# Patient Record
Sex: FEMALE | Race: WHITE | Employment: OTHER | URBAN - NONMETROPOLITAN AREA
[De-identification: names, ages, dates, MRNs, and addresses within clinical notes are randomized per-mention and may not be internally consistent; named-entity substitution may affect disease eponyms.]

---

## 2022-07-13 NOTE — PROGRESS NOTES
MEDICAL ONCOLOGY CONSULTATION    Pt Name: Devon Stallings  MRN: [id number]  [place]: 1955  Date of evaluation: 7/25/2022    REASON FOR CONSULTATION:  Breast cancer  REQUESTING PHYSICIAN: Dr Franco Needle    History Obtained From:  patient and old medical records    HISTORY OF PRESENT ILLNESS:    Diagnosis  Invasive lobular carcinoma, left breast cancer 2002  Tubular carcinoma, right breast, 2006  Low grade  Infiltrating ductal carcinoma, right breast, March 2016  Grade 1  ER 99%, MA 91%, HER-2 2+/equivocal, FISH-negative  Infiltrating ductal carcinoma, right breast, March 2018  Grade 1  ER 90.86%, MA 89.29%, HER-2 1+/negative  Onctoype DX recurrence score: 4, 2018  Infiltrating ductal carcinoma, right breast, Feb 2021  Grade 1   ER 98%, %, HER-2 1+/negative  BRCA negative, 2018    Treatment Summary  2002 Left lumpectomy  2002 MammoSite radiation therapy  2002 Tamoxifen, Arimidex tried, but unable to tolerate adjuvant estrogen blocking therapy  2007 - 2012 Faslodex x5 years  4/25/16 Bilateral skin sparing mastectomy with right negative SLNB  2016 Fareston, Aromasin  4/24/18 Right breast lumpectomy  May 2018 - Aug 2019 Arimidex  Aug 2019 - March 2020 Tamoxifen x3 months. Discontinued due to side effects  Feb 2021 Letrozole-unable to take due to side effects  1/11/22 Arimidex 1mg daily  5/25/22 Faslodex every 4 weeks    Cancer History  The patient has a very long history of left/right breast cancer over the last 2 decades. Further details as depicted below. 2002 Invasive lobular carcinoma, left breast cancer   2002 Tamoxifen, Arimidex tried, but unable to tolerate adjuvant estrogen blocking therapy  2002 MammoSite radiation therapy  2007 Tubular carcinoma, right breast, Low grade  2007 - 2012 Faslodex x5 years  5/24/13 CA 27-29 18.8  2/20/14 CA 27-29 16.8  3/2/15 CA 27-29 19.7  3/18/16 Right breast biopsies [facility]) : infiltrating low grade ductal carcinoma.   ER 99%, MA 91%, HER-2 2+/equivocal, FISH-negative  4/25/16 Bilateral skin sparing mastectomy with right negative SLNB  4/25/16 Bilateral skin sparing mastectomy with right negative SLNB [facility]): Right breast mastectomy - infiltrating ductal carcinoma, low-grade (0.5 cm). No lymphovascular invasion noted. Focal calcifications identified. Tumor noted at the anterior margin. Left breast, mastectomy - unremarkable nipple and areola. Calcified cystic cavity noted. No evidence of malignancy identified. Margins are negative. Report Name: Breast Invasive Carcinoma. Right sentinel node biopsy - benign fat, no lymph node identified. 2016 Jeannette Mcdonnellasin  3/20/18 US right breast [facility]): 5 mm lesion in the right breast. Ultrasound-guided breast biopsy is recommended and will be scheduled. 3/27/18 Right breast 3:00 lump [facility]): Infiltrating ductal carcinoma, grade 1/3. ER 90.86%, NJ 89.29%, HER-2 1+/negative  4/6/18 PET scan [facility]): There is a focal area of increased uptake within the cecum which could be physiologic or could be related to malignancy. Follow-up colonoscopy is recommended. No other sites of disease identified. 4/7/18 MRI bilateral breast [facility]): No evidence of adenopathy. Findings in both breasts are felt to be benign. ACR BIRADS Category 2-benign. Note that this patient does have biopsy of a known malignancy in the right breast at 3:00. No malignant features are demonstrated by MRI, however. 4/24/18 Right breast lumpectomy [facility]): Two foci of infiltrating ductal carcinoma, low grade (6 mm and 3 mm). Surgical margins are negative. April 2018 Cinthya Rice DX recurrence score: 4  May 2018 - Aug 2019 Arimidex  8/27/18 Bone density ([facility]): Normal. Lumbar spine T-score -0.6. Left hi T-score -0.8 falling in the range of normal.   4/10/19 CT abd/pelvis [facility]): No acute abnormality or obvious etiology for patient's symptoms seen.   Aug 2019 - March 2020 Tamoxifen x3 months. Discontinued due to side effects  1/14/21 Right diagnostic mammogram [facility]): Ill-defined lesion in the 7 o'clock location of the postmastectomy right breast is localized to the skin. Given the patient's prior significant history of bilateral breast malignancy and recurrence to the postmastectomy right breast, findings are viewed with suspicion. As a result, surgical consultation is recommended for referral for evaluation and possible excision/skin punch biopsy of the lesion for further evaluation. 1/14/21 US right breast [facility]): Focused ultrasound of the postmastectomy right breast at 3 o'clock, 7 cm from the nipple demonstrates an ill-defined area of hypoechogenicity localized within the skin that measures up to 3 mm. There is no actual mass identified within   the postmastectomy breast tissues. 1/26/21 Right diagnostic mammogram [facility]): There are findings consistent with prior mastectomy with silicone implant reconstruction. A surgical clip is identified in the lower left axillary region. No dominant masses, suspicious calcifications, or areas of nonsurgical architectural distortion are seen. Within the posterior outer left breast, subjacent to the skin marker, there is a questioned area of soft tissue density which may correspond with the questioned palpable finding. Further evaluation was performed with ultrasound. 1/26/21 US right breast [facility]): Targeted left breast sonography was performed. Left 3:00, 8 cm to 12 cm from the nipple, at site of the patient's palpable ability: There is an oval, parallel, hypoechoic/mixed echogenic mass measuring 3.3 cm. It demonstrates a primarily hypoechoic appearance with a questioned hyperechoic rim and a linear area of increased echogenicity within it. It possibly represents an area of fat necrosis or postsurgical change related to her prior tightening procedure, possibly related to sutures or mesh.    Feb 2021 Letrozole-unable to take due to side effects  2/1/21 Breast, right, excisional biopsy [facility]): Infiltrating ductal carcinoma, grade 1 (of 3). Margins are negative for invasive malignancy. Breast, left, excisional biopsy: Fibrosis with foreign body giant cell reaction. Benign skeletal muscle. Negative for malignancy. COMMENT:  There is a focus of invasive ductal carcinoma measuring 0.3 cm located in the dermis and extending into the underlying subcutaneous tissue. There is no involvement of the epidermis. ER 98%, %, HER-2 1+/negative   5/25/21 CA 27-29 18.4  6/8/21 CT chest/abd/pelvis ([facility]): Redemonstrated prominence of the common bile duct and the intrahepatic biliary ducts, which was seen dating back to 2019. Patient is status post cholecystectomy. The liver otherwise shows no focal abnormality. Negative for evidence of metastatic disease elsewhere within the chest, abdomen, or pelvis. 11/4/21 CT chest/abd/pelvis [facility]): Stable exam without evidence for residual or recurrent disease. Postoperative changes in the stomach. Stable intrahepatic and extrahepatic biliary ductal dilatation. 11/21/21 Right breast, 6:00 lesion, excisional biopsy [facility]) : Invasive ductal carcinoma. Size of at least 0.5 cm. Margins appear uninvolved. Right breast, 3:00 lesion, excision: Invasive ductal carcinoma. Size at least 0.5 cm. Margins appear uninvolved. ER 94.54%, MD 96.75%, HER-2 1+/negative  11/12/2021 laparoscopic right hemicolectomy: Showed polyps with multiple fragments of tubulovillous adenoma no high-grade dysplasia. No malignancy no lymph node involvement. 1/11/22 Initiated Arimidex 1mg daily  4/8/22 MRI left shoulder [facility]): Moderate supraspinatus and infraspinatus tendinosis with associated bursitis. Mild shoulder and AC joint osteoarthrosis. No evidence of metastatic disease to the shoulder.   5/10/22 US bilateral breast [facility]): No sonographic evidence of malignancy seen in either breast. Recommend high risk screening as per plan. 5/25/22 Initiated Faslodex every 4 weeks  6/9/22 CA 27-29 18.2  6/24/22 CT chest/abd/pelvis [facility]): No evidence of residual or recurrent disease. Persistent diffuse dilation of the common bile duct and intrahepatic biliary ducts. 7/13/2022 NM PET CT Skull Base to Mid Thigh([facility]) No evidence of disease. 7/25/2022-she is planning to have another surgery tomorrow. She is currently on Faslodex, off label due to poor tolerance on AI/tamoxifen in the past.        Past Medical History:    Past Medical History:   Diagnosis Date    Basal cell adenocarcinoma     Breast cancer [facility])        Past Surgical History:    Past Surgical History:   Procedure Laterality Date    BREAST LUMPECTOMY Right 04/24/2018    BREAST LUMPECTOMY Left 2002    COLON SURGERY         Social History:    Marital status:  Smoking status:No  ETOH status:Occasionally; 1 monthly  Resides[place]    Family History:   Family History   Problem Relation Age of Onset    Breast Cancer Mother 68    Cancer Paternal Aunt 68        Ovarian       Current Hospital Medications:    No current outpatient medications on file. No current facility-administered medications for this visit.        Allergies: Not on File      Subjective   REVIEW OF SYSTEMS:   CONSTITUTIONAL: no fever, no night sweats,hot flashes, no fatigue;  HEENT: no blurring of vision, no double vision, no hearing difficulty, no tinnitus, no ulceration, no dysplasia, no epistaxis;  LUNGS: no cough, no hemoptysis, no wheeze,  no shortness of breath;  CARDIOVASCULAR: no palpitation, no chest pain, no shortness of breath;  GI: no abdominal pain, no nausea, no vomiting, no diarrhea, no constipation;  FRANCINE: no dysuria, no hematuria, no frequency or urgency, no nephrolithiasis;  MUSCULOSKELETAL: no joint pain, no swelling, no stiffness;  ENDOCRINE: no polyuria, no polydipsia, no cold or heat intolerance;  HEMATOLOGY: no easy bruising or bleeding, no history of clotting disorder;  DERMATOLOGY: no skin rash, no eczema, no pruritus;  PSYCHIATRY: no depression, no anxiety, no panic attacks, no suicidal ideation, no homicidal ideation;  NEUROLOGY: no syncope, no seizures, no numbness or tingling of hands, no numbness or tingling of feet, no paresis;    Objective   BP (!) 140/70 (Site: Left Upper Arm, Position: Sitting)   Pulse 92   Ht 5' 3\" (1.6 m)   Wt 169 lb 6.4 oz (76.8 kg)   SpO2 98%   BMI 30.01 kg/m²     PHYSICAL EXAM:  CONSTITUTIONAL: Alert, appropriate, no acute distress  EYES: Non icteric, EOM intact, pupils equal round   ENT: Mucus membranes moist, no oral pharyngeal lesions, external inspection of ears and nose are normal  NECK: Supple, no masses. No palpable thyroid mass  CHEST/LUNGS: CTA bilaterally, normal respiratory effort   CHAPERONED BREAST EXAM: Kwesi Anguiano MA  CARDIOVASCULAR: RRR, no murmurs. No lower extremity edema  ABDOMEN: soft non-tender, active bowel sounds, no HSM. No palpable masses  EXTREMITIES: warm, full ROM in all 4 extremities, no focal weakness. SKIN: 3 breast lumps,warm, dry with no rashes or lesions  LYMPH: No cervical, clavicular, axillary, or inguinal lymphadenopathy  NEUROLOGIC: follows commands, non focal   PSYCH: mood and affect appropriate. Alert and oriented to time, place, person      LABORATORY RESULTS REVIEWED/ANALYZED BY ME:  7/21/2022 CBC  WBC 4.5  RBC 3.58  HGB 12.0  Neut 2.5    RADIOLOGY STUDIES REVIEWED BY ME:  As above      ASSESSMENT:    No orders of the defined types were placed in this encounter. Nimo Panda was seen today for new patient.     Diagnoses and all orders for this visit:    Local recurrence of cancer of right breast [facility])    Care plan discussed with patient    Prophylactic use of fulvestrant (Faslodex)       Local recurrence/second primary IDC right breast ER/NH positive, HER2 negative  -Proceed with surgery tomorrow  -RTC with me after surgery  -Further treatment recommendations to follow  -Continue Faslodex. Next dose 8/17/2022    Genetic assessment she had a BRCA 1/2 test performed in the past.  This was negative. I have recommended her more comprehensive genetic test.  We will order genetic test during next visit. PLAN:  RTC with MD 8/17/2022  Recommend Genetic Testing next visit  Continue Faslodex every 4 weeks  Proceed with surgery  Consider radiation therapy after surgery/pathology report        Mino AGUILERA am pre-charting as a registered nurse for Richelle Brown MD. Electronically signed by Mino Hill RN on 7/25/2022 at 7:15 AM CDT. Mary Parada am scribing as Medical Assistant for Richelle Brown MD. Electronically signed by Rekha Ba MA on 7/25/2022 at 2:37 PM CDT. I, Dr Negrito Herrera, personally performed the services described in this documentation as scribed by Rekha Ba MA in my presence and is both accurate and complete. I have seen, examined and reviewed this patient medication list, appropriate labs and imaging studies. I reviewed relevant medical records and others physicians notes. I discussed the plans of care with the patient. I answered all the questions to the patients satisfaction. I have also reviewed the chief complaint (CC) and part of the history (History of Present Illness (HPI), Past Family Social History [facility]), or Review of Systems (ROS) and made changes when appropriated.        (Please note that portions of this note were completed with a voice recognition program. Efforts were made to edit the dictations but occasionally words are mis-transcribed.)  Electronically signed by Richelle Brown MD on 7/25/2022 at 7:07 PM      The total time (45min) I spent to see the patient today includes at least one or more of the following: preparing to see the patient by reviewing prior tests, prior notes or other relevant information, performing appropriate independent examination and evaluation, counseling, ordering of medications, tests or procedures documenting clinic information in the electronic medical record or other health records, independently interpreting results of tests, managing test results and communicating the results to the patient/family or caregiver.

## 2022-07-25 ENCOUNTER — HOSPITAL ENCOUNTER (OUTPATIENT)
Dept: INFUSION THERAPY | Age: 67
Discharge: HOME | End: 2022-07-25
Payer: MEDICARE

## 2022-07-25 ENCOUNTER — OFFICE VISIT (OUTPATIENT)
Dept: HEMATOLOGY | Age: 67
End: 2022-07-25
Payer: MEDICARE

## 2022-07-25 VITALS
SYSTOLIC BLOOD PRESSURE: 140 MMHG | HEART RATE: 92 BPM | BODY MASS INDEX: 30.02 KG/M2 | HEIGHT: 63 IN | DIASTOLIC BLOOD PRESSURE: 70 MMHG | OXYGEN SATURATION: 98 % | WEIGHT: 169.4 LBS

## 2022-07-25 DIAGNOSIS — Z71.89 CARE PLAN DISCUSSED WITH PATIENT: ICD-10-CM

## 2022-07-25 DIAGNOSIS — Z79.818 PROPHYLACTIC USE OF FULVESTRANT (FASLODEX): ICD-10-CM

## 2022-07-25 DIAGNOSIS — R89.9 ABNORMAL LABORATORY TEST: Primary | ICD-10-CM

## 2022-07-25 DIAGNOSIS — C50.911 LOCAL RECURRENCE OF CANCER OF RIGHT BREAST (HCC): Primary | ICD-10-CM

## 2022-07-25 DIAGNOSIS — R89.9 ABNORMAL LABORATORY TEST: ICD-10-CM

## 2022-07-25 PROCEDURE — 1123F ACP DISCUSS/DSCN MKR DOCD: CPT | Performed by: INTERNAL MEDICINE

## 2022-07-25 PROCEDURE — 1090F PRES/ABSN URINE INCON ASSESS: CPT | Performed by: INTERNAL MEDICINE

## 2022-07-25 PROCEDURE — 3017F COLORECTAL CA SCREEN DOC REV: CPT | Performed by: INTERNAL MEDICINE

## 2022-07-25 PROCEDURE — 4004F PT TOBACCO SCREEN RCVD TLK: CPT | Performed by: INTERNAL MEDICINE

## 2022-07-25 PROCEDURE — 99204 OFFICE O/P NEW MOD 45 MIN: CPT | Performed by: INTERNAL MEDICINE

## 2022-07-25 PROCEDURE — 99212 OFFICE O/P EST SF 10 MIN: CPT

## 2022-07-25 PROCEDURE — G8417 CALC BMI ABV UP PARAM F/U: HCPCS | Performed by: INTERNAL MEDICINE

## 2022-07-25 PROCEDURE — G8400 PT W/DXA NO RESULTS DOC: HCPCS | Performed by: INTERNAL MEDICINE

## 2022-07-25 PROCEDURE — G8428 CUR MEDS NOT DOCUMENT: HCPCS | Performed by: INTERNAL MEDICINE

## 2022-07-26 DIAGNOSIS — C50.919 INFILTRATING DUCTAL CARCINOMA (HCC): ICD-10-CM

## 2022-07-28 ENCOUNTER — TELEPHONE (OUTPATIENT)
Dept: INFUSION THERAPY | Age: 67
End: 2022-07-28

## 2022-07-28 NOTE — TELEPHONE ENCOUNTER
Introduced myself to Wing Zafar today. I went over her insurance benefits with her and informed her that she shouldn't have any out of pocket costs associated with her Faslodex injections since she has Medicare and [employer]. I am going to mail her my contact information if she has any billing or financial concerns.     Claudia Bryson, [id number] [facility]  [phone]

## 2022-08-01 ENCOUNTER — TELEPHONE (OUTPATIENT)
Dept: HEMATOLOGY | Age: 67
End: 2022-08-01

## 2022-08-01 NOTE — TELEPHONE ENCOUNTER
Returned phone call regarding her concern of pathology results/radiation. Paul Sepulveda has reviewed pathology from [facility] and is aware of results. Paul Sepulveda has recommended for patient to proceed with follow up appointment with us in clinic. We will then send a referral for radiation. Patient has verbalized understanding of procedure.

## 2022-08-16 NOTE — PROGRESS NOTES
MEDICAL ONCOLOGY PROGRESS NOTE    Pt Name: Micah Xiao  MRN: [id number]  Niravgfurt: 1955  Date of evaluation: 8/17/2022    HISTORY OF PRESENT ILLNESS:    Diagnosis  Invasive lobular carcinoma, left breast cancer 2002  Tubular carcinoma, right breast, 2006  Low grade  Infiltrating ductal carcinoma, right breast, March 2016  Grade 1  ER 99%, PA 91%, HER-2 2+/equivocal, FISH-negative  Infiltrating ductal carcinoma, right breast, March 2018  Grade 1  ER 90.86%, PA 89.29%, HER-2 1+/negative  Onctoype DX recurrence score: 4, 2018  Infiltrating ductal carcinoma, right breast, Feb 2021  Grade 1   ER 98%, %, HER-2 1+/negative. BRCA negative, 2018   (A) ER 50.95%,PA 74.77%,HER2 1+ Negative; (B) ER 41.80,PA 78.95 HER2 1+ Negative    Treatment Summary  2002 Left lumpectomy  2002 MammoSite radiation therapy  2002 Tamoxifen, Arimidex tried, but unable to tolerate adjuvant estrogen blocking therapy  2007 - 2012 Faslodex x5 years  4/25/16 Bilateral skin sparing mastectomy with right negative SLNB  2016 Fareston, Aromasin  4/24/18 Right breast lumpectomy  May 2018 - Aug 2019 Arimidex  Aug 2019 - March 2020 Tamoxifen x3 months. Discontinued due to side effects  Feb 2021 Letrozole-unable to take due to side effects  1/11/22 Arimidex 1mg daily  5/25/22 Faslodex every 4 weeks    Cancer History  The patient has a very long history of left/right breast cancer over the last 2 decades. Further details as depicted below. 2002 Invasive lobular carcinoma, left breast cancer   2002 Tamoxifen, Arimidex tried, but unable to tolerate adjuvant estrogen blocking therapy  2002 MammoSite radiation therapy  2007 Tubular carcinoma, right breast, Low grade  2007 - 2012 Faslodex x5 years  5/24/13 CA 27-29 18.8  2/20/14 CA 27-29 16.8  3/2/15 CA 27-29 19.7  3/18/16 Right breast biopsies [facility]) : infiltrating low grade ductal carcinoma.   ER 99%, PA 91%, HER-2 2+/equivocal, FISH-negative  4/25/16 Bilateral skin sparing mastectomy with right negative SLNB  4/25/16 Bilateral skin sparing mastectomy with right negative SLNB [facility]): Right breast mastectomy - infiltrating ductal carcinoma, low-grade (0.5 cm). No lymphovascular invasion noted. Focal calcifications identified. Tumor noted at the anterior margin. Left breast, mastectomy - unremarkable nipple and areola. Calcified cystic cavity noted. No evidence of malignancy identified. Margins are negative. Report Name: Breast Invasive Carcinoma. Right sentinel node biopsy - benign fat, no lymph node identified. 2016 Fareston, Aromasin  3/20/18 US right breast [facility]): 5 mm lesion in the right breast. Ultrasound-guided breast biopsy is recommended and will be scheduled. 3/27/18 Right breast 3:00 lump [facility]): Infiltrating ductal carcinoma, grade 1/3. ER 90.86%, PA 89.29%, HER-2 1+/negative  4/6/18 PET scan [facility]): There is a focal area of increased uptake within the cecum which could be physiologic or could be related to malignancy. Follow-up colonoscopy is recommended. No other sites of disease identified. 4/7/18 MRI bilateral breast [facility]): No evidence of adenopathy. Findings in both breasts are felt to be benign. ACR BIRADS Category 2-benign. Note that this patient does have biopsy of a known malignancy in the right breast at 3:00. No malignant features are demonstrated by MRI, however. 4/24/18 Right breast lumpectomy [facility]): Two foci of infiltrating ductal carcinoma, low grade (6 mm and 3 mm). Surgical margins are negative. April 2018 Idelia Duck DX recurrence score: 4  May 2018 - Aug 2019 Arimidex  8/27/18 Bone density ([address]): Normal. Lumbar spine T-score -0.6. Left hi T-score -0.8 falling in the range of normal.   4/10/19 CT abd/pelvis [facility]): No acute abnormality or obvious etiology for patient's symptoms seen. Aug 2019 - March 2020 Tamoxifen x3 months.  Discontinued due to side effects  1/14/21 Right diagnostic mammogram [facility]): Ill-defined lesion in the 7 o'clock location of the postmastectomy right breast is localized to the skin. Given the patient's prior significant history of bilateral breast malignancy and recurrence to the postmastectomy right breast, findings are viewed with suspicion. As a result, surgical consultation is recommended for referral for evaluation and possible excision/skin punch biopsy of the lesion for further evaluation. 1/14/21 US right breast [facility]): Focused ultrasound of the postmastectomy right breast at 3 o'clock, 7 cm from the nipple demonstrates an ill-defined area of hypoechogenicity localized within the skin that measures up to 3 mm. There is no actual mass identified within   the postmastectomy breast tissues. 1/26/21 Right diagnostic mammogram [facility]): There are findings consistent with prior mastectomy with silicone implant reconstruction. A surgical clip is identified in the lower left axillary region. No dominant masses, suspicious calcifications, or areas of nonsurgical architectural distortion are seen. Within the posterior outer left breast, subjacent to the skin marker, there is a questioned area of soft tissue density which may correspond with the questioned palpable finding. Further evaluation was performed with ultrasound. 1/26/21 US right breast [facility]): Targeted left breast sonography was performed. Left 3:00, 8 cm to 12 cm from the nipple, at site of the patient's palpable ability: There is an oval, parallel, hypoechoic/mixed echogenic mass measuring 3.3 cm. It demonstrates a primarily hypoechoic appearance with a questioned hyperechoic rim and a linear area of increased echogenicity within it. It possibly represents an area of fat necrosis or postsurgical change related to her prior tightening procedure, possibly related to sutures or mesh.    Feb 2021 Letrozole-unable to take due to side effects  2/1/21 Breast, right, excisional biopsy [facility]): Infiltrating ductal carcinoma, grade 1 (of 3). Margins are negative for invasive malignancy. Breast, left, excisional biopsy: Fibrosis with foreign body giant cell reaction. Benign skeletal muscle. Negative for malignancy. COMMENT:  There is a focus of invasive ductal carcinoma measuring 0.3 cm located in the dermis and extending into the underlying subcutaneous tissue. There is no involvement of the epidermis. ER 98%, %, HER-2 1+/negative   5/25/21 CA 27-29 18.4  6/8/21 [facility] Redemonstrated prominence of the common bile duct and the intrahepatic biliary ducts, which was seen dating back to 2019. Patient is status post cholecystectomy. The liver otherwise shows no focal abnormality. Negative for evidence of metastatic disease elsewhere within the chest, abdomen, or pelvis. 11/4/21 CT chest/abd/pelvis [facility]): Stable exam without evidence for residual or recurrent disease. Postoperative changes in the stomach. Stable intrahepatic and extrahepatic biliary ductal dilatation. 11/21/21 Right breast, 6:00 lesion, excisional biopsy [facility]) : Invasive ductal carcinoma. Size of at least 0.5 cm. Margins appear uninvolved. Right breast, 3:00 lesion, excision: Invasive ductal carcinoma. Size at least 0.5 cm. Margins appear uninvolved. ER 94.54%, RI 96.75%, HER-2 1+/negative  11/12/2021 laparoscopic right hemicolectomy: Showed polyps with multiple fragments of tubulovillous adenoma no high-grade dysplasia. No malignancy no lymph node involvement. 1/11/22 Initiated Arimidex 1mg daily  4/8/22 MRI left shoulder [facility]): Moderate supraspinatus and infraspinatus tendinosis with associated bursitis. Mild shoulder and AC joint osteoarthrosis. No evidence of metastatic disease to the shoulder. 5/10/22 US bilateral breast [facility]): No sonographic evidence of malignancy seen in either breast. Recommend high risk screening as per plan.   5/25/22 Initiated Faslodex every 4 weeks  6/9/22 CA 27-29 18.2  6/24/22 CT chest/abd/pelvis [facility]): No evidence of residual or recurrent disease. Persistent diffuse dilation of the common bile duct and intrahepatic biliary ducts. 7/13/2022 NM PET CT Skull Base to Mid Thigh([facility]) No evidence of disease. 7/13/2022 MRI Cholangiogram [facility]) Marked intra and extrahepatic biliary ductal dilation likely represents a normal post cholecystectomy appearance for this patient. No pancreatic head or periampullary mass or choledocholithiasis is seen. 7/25/2022-she is planning to have another surgery tomorrow. She is currently on Faslodex, off label due to poor tolerance on AI/tamoxifen in the past.  7/26/2022 [facility]) Right breast lesion at 1:00: Skin with attached fibrofatty tissue. No malignancy noted. Surgical margins are unremarkable. Right breast lesion at 3 o'clock: Infiltrating ductal carcinoma, grade 2/3. Lesion extends to surgical margin. ER:Positive (50.95%, Strong) CO:Positive (74.77%, Strong) HER2/ROBBIE TISSUE ASSAY-PARAFFIN RESULTS:1+ Negative. Right breast lesion at 5 o'clock: Infiltrating ductal carcinoma, grade 2/3. Lesion extends to surgical margin. ER: Positive (41.80%, Strong) CO:Positive (78.95%, Strong) HER2/ROBBIE TISSUE ASSAY-PARAFFIN RESULTS:1+ Negative.    08/11/2022- the patient again had another excisional biopsy performed. Margins positive. Patient is current on Faslodex. She was seen by her plastic surgeon. They suggested a second opinion with a local oncologist for the case to be discussed at tumor board. I believe this is an excellent idea. I requested patient to get a second opinion/consultation.         Past Medical History:    Past Medical History:   Diagnosis Date    Basal cell adenocarcinoma     Breast cancer [facility])        Past Surgical History:    Past Surgical History:   Procedure Laterality Date    BREAST LUMPECTOMY Right 04/24/2018    BREAST LUMPECTOMY Left 2002    COLON SURGERY         Social History:    Marital status:  Smoking status:No  ETOH status:Occasionally; 1 monthly  Resides[place]    Family History:   Family History   Problem Relation Age of Onset    Breast Cancer Mother 68    Cancer Paternal Aunt 68        Ovarian       Current Hospital Medications:    No current outpatient medications on file. No current facility-administered medications for this visit.      Facility-Administered Medications Ordered in Other Visits   Medication Dose Route Frequency Provider Last Rate Last Admin    fulvestrant (FASLODEX) IM injection 500 mg  500 mg IntraMUSCular Once Ayo Adame MD           Allergies: Not on File      Subjective   REVIEW OF SYSTEMS:   CONSTITUTIONAL: no fever, no night sweats,  fatigue;  HEENT: no blurring of vision, no double vision, no hearing difficulty, no tinnitus, no ulceration, no dysplasia, no epistaxis;   LUNGS: no cough, no hemoptysis, no wheeze,  no shortness of breath;  CARDIOVASCULAR: hypertensive,no palpitation, no chest pain, no shortness of breath;  GI: no abdominal pain, no nausea, no vomiting, no diarrhea, no constipation;  FRANCINE: no dysuria, no hematuria, no frequency or urgency, no nephrolithiasis;  MUSCULOSKELETAL:LUE pain, no joint pain, no swelling, no stiffness;  ENDOCRINE: no polyuria, no polydipsia, no cold or heat intolerance;  HEMATOLOGY: no easy bruising or bleeding, no history of clotting disorder;  DERMATOLOGY: no skin rash, no eczema, no pruritus;  PSYCHIATRY: no depression, no anxiety, no panic attacks, no suicidal ideation, no homicidal ideation;  NEUROLOGY: no syncope, no seizures, no numbness or tingling of hands, no numbness or tingling of feet, no paresis;     Objective   BP (!) 158/98 (Site: Right Upper Arm, Position: Sitting)   Pulse 73   Ht 5' 3\" (1.6 m)   Wt 166 lb 6.4 oz (75.5 kg)   SpO2 97%   BMI 29.48 kg/m²     PHYSICAL EXAM:  CONSTITUTIONAL: Alert, appropriate, no acute distress  EYES: Non icteric, EOM intact, pupils equal round   ENT: Mucus membranes moist, no oral pharyngeal lesions, external inspection of ears and nose are normal.  NECK: Supple, no masses. No palpable thyroid mass  CHEST/LUNGS: CTA bilaterally, normal respiratory effort   CARDIOVASCULAR: RRR, no murmurs. No lower extremity edema  ABDOMEN: soft non-tender, active bowel sounds, no HSM. No palpable masses  EXTREMITIES: warm, full ROM in all 4 extremities, no focal weakness. SKIN: warm, dry with no rashes or lesions  LYMPH: No cervical, clavicular, axillary, or inguinal lymphadenopathy  NEUROLOGIC: follows commands, non focal   PSYCH: mood and affect appropriate. Alert and oriented to time, place, person      LABORATORY RESULTS REVIEWED/ANALYZED BY ME:  7/26/2022 [facility] [place]) Right breast lesion at 1:00: Skin with attached fibrofatty tissue. No malignancy noted. Surgical margins are unremarkable. Right breast lesion at 3 o'clock: Infiltrating ductal carcinoma, grade 2/3. Lesion extends to surgical margin. ER:Positive (50.95%, Strong) SD:Positive (74.77%, Strong) HER2/ROBBIE TISSUE ASSAY-PARAFFIN RESULTS:1+ Negative. Right breast lesion at 5 o'clock: Infiltrating ductal carcinoma, grade 2/3. Lesion extends to surgical margin. ER: Positive (41.80%, Strong) SD:Positive (78.95%, Strong) HER2/ROBBIE TISSUE ASSAY-PARAFFIN RESULTS:1+ Negative. Lab Results   Component Value Date    WBC 6.99 08/17/2022    HGB 13.6 08/17/2022    HCT 41.7 08/17/2022    MCV 99.8 (H) 08/17/2022     08/17/2022     Lab Results   Component Value Date    NEUTROABS 3.75 08/17/2022     RADIOLOGY STUDIES REVIEWED BY ME:  None      ASSESSMENT:    Orders Placed This Encounter   Procedures    Comprehensive Metabolic Panel     Standing Status:   Future     Standing Expiration Date:   8/17/2023    Miscellaneous Sendout     Standing Status:   Future     Standing Expiration Date:   8/17/2023     Order Specific Question:   Specify Req.  Test (1 Test/Order)     Answer:   Anahy  was seen today for follow-up. Diagnoses and all orders for this visit:    Infiltrating ductal carcinoma ([facility] [address]  -     Cancel: Miscellaneous Sendout; Future  -     Comprehensive Metabolic Panel; Future  -     Miscellaneous Sendout; Future    Local recurrence of cancer of right breast ([facility] [address]  -     Cancel: Miscellaneous Sendout; Future  -     Comprehensive Metabolic Panel; Future  -     Miscellaneous Sendout; Future    Care plan discussed with patient       Local recurrence/second primary IDC right breast ER/OH positive, HER2 negative  -Patient underwent excision of right breast lesion 7/26/2022.  -7/26/2022 [facility]) Right breast lesion at 1:00: Skin with attached fibrofatty tissue. No malignancy noted. Surgical margins are unremarkable. Right breast lesion at 3 o'clock: Infiltrating ductal carcinoma, grade 2/3. Lesion extends to surgical margin. ER:Positive (50.95%, Strong) OH:Positive (74.77%, Strong) HER2/ROBBIE TISSUE ASSAY-PARAFFIN RESULTS:1+ Negative. Right breast lesion at 5 o'clock: Infiltrating ductal carcinoma, grade 2/3. Lesion extends to surgical margin. ER: Positive (41.80%, Strong) OH:Positive (78.95%, Strong) HER2/ROBBIE TISSUE ASSAY-PARAFFIN RESULTS:1+ Negative.   -Unfortunately, no dimensions were provided. The lesion is grade 2/3. The lesion is ER/OH positive but less intense than the prior lesions.  -Continue Faslodex for the time being. The patient has tolerated prior therapies with tamoxifen or aromatase inhibitors very poorly. -She has started Faslodex about 4 months ago. Therefore I cannot say that this is a failure of her Faslodex. In addition, she has no other reasonable option as endocrine therapy. I agree with seeing an oncologist locally to have case discussed at tumor board. I do not know if she will benefit from radiation or chemotherapy at this time. Genetic assessment she had a BRCA 1/2 test performed in the past.  This was negative.   I have recommended her more comprehensive genetic test. We will order genetic test during next visit. Genetic testing related today.  -Meghan Genetic test ordered today 8/17/2000 and sure    PLAN:  RTC with MD 4 weeks  CBC,CMP and NateraTesting today  Will proceed with [address] Patel for second opinion  Recommend Oncotype DX  Continue Faslodex every 4 weeks  Proceed with surgery  Consider radiation therapy after surgery depending on pathology report    IKeagan am pre charting  as Medical Assistant for Shane Leal MD. Electronically signed by Keagan Hodges MA on 8/17/2022 at 11:47 AM CDT. Avinash Ruiz am scribing as Medical Assistant for Shane Leal MD. Electronically signed by Keagan Hodges MA on 8/17/2022 at 12:08 PM CDT. I, Dr Burgess Escudero, personally performed the services described in this documentation as scribed by Keagan Hodges MA in my presence and is both accurate and complete. I have seen, examined and reviewed this patient medication list, appropriate labs and imaging studies. I reviewed relevant medical records and others physicians notes. I discussed the plans of care with the patient. I answered all the questions to the patients satisfaction. I have also reviewed the chief complaint (CC) and part of the history (History of Present Illness (HPI), Past Family Social History [facility]), or Review of Systems (ROS) and made changes when appropriated.        (Please note that portions of this note were completed with a voice recognition program. Efforts were made to edit the dictations but occasionally words are mis-transcribed.)  Electronically signed by Shane Leal MD on 8/17/2022 at 12:40 PM

## 2022-08-17 ENCOUNTER — HOSPITAL ENCOUNTER (OUTPATIENT)
Dept: INFUSION THERAPY | Age: 67
Discharge: HOME | End: 2022-08-17
Payer: MEDICARE

## 2022-08-17 ENCOUNTER — OFFICE VISIT (OUTPATIENT)
Dept: HEMATOLOGY | Age: 67
End: 2022-08-17
Payer: MEDICARE

## 2022-08-17 VITALS
WEIGHT: 166.4 LBS | BODY MASS INDEX: 29.48 KG/M2 | HEIGHT: 63 IN | HEART RATE: 73 BPM | OXYGEN SATURATION: 97 % | SYSTOLIC BLOOD PRESSURE: 158 MMHG | DIASTOLIC BLOOD PRESSURE: 98 MMHG

## 2022-08-17 DIAGNOSIS — R89.9 ABNORMAL LABORATORY TEST: Primary | ICD-10-CM

## 2022-08-17 DIAGNOSIS — C50.911 LOCAL RECURRENCE OF CANCER OF RIGHT BREAST (HCC): ICD-10-CM

## 2022-08-17 DIAGNOSIS — Z71.89 CARE PLAN DISCUSSED WITH PATIENT: ICD-10-CM

## 2022-08-17 DIAGNOSIS — C50.919 INFILTRATING DUCTAL CARCINOMA (HCC): ICD-10-CM

## 2022-08-17 DIAGNOSIS — C50.919 INFILTRATING DUCTAL CARCINOMA (HCC): Primary | ICD-10-CM

## 2022-08-17 LAB
ALBUMIN SERPL-MCNC: 4.5 G/DL (ref 3.5–5.2)
ALP BLD-CCNC: 151 U/L (ref 35–104)
ALT SERPL-CCNC: 37 U/L (ref 9–52)
ANION GAP SERPL CALCULATED.3IONS-SCNC: 8 MMOL/L (ref 7–19)
AST SERPL-CCNC: 34 U/L (ref 14–36)
BASOPHILS ABSOLUTE: 0.03 K/UL (ref 0.01–0.08)
BASOPHILS RELATIVE PERCENT: 0.4 % (ref 0.1–1.2)
BILIRUB SERPL-MCNC: 0.7 MG/DL (ref 0.2–1.3)
BUN BLDV-MCNC: 13 MG/DL (ref 7–17)
CALCIUM SERPL-MCNC: 9.6 MG/DL (ref 8.4–10.2)
CHLORIDE BLD-SCNC: 99 MMOL/L (ref 98–111)
CO2: 32 MMOL/L (ref 22–29)
CREAT SERPL-MCNC: 0.7 MG/DL (ref 0.5–1)
EOSINOPHILS ABSOLUTE: 0.25 K/UL (ref 0.04–0.54)
EOSINOPHILS RELATIVE PERCENT: 3.6 % (ref 0.7–7)
GFR NON-AFRICAN AMERICAN: >60
GLOBULIN: 2.6 G/DL
GLUCOSE BLD-MCNC: 107 MG/DL (ref 74–106)
HCT VFR BLD CALC: 41.7 % (ref 34.1–44.9)
HEMOGLOBIN: 13.6 G/DL (ref 11.2–15.7)
LYMPHOCYTES ABSOLUTE: 2.51 K/UL (ref 1.18–3.74)
LYMPHOCYTES RELATIVE PERCENT: 35.9 % (ref 19.3–53.1)
MCH RBC QN AUTO: 32.5 PG (ref 25.6–32.2)
MCHC RBC AUTO-ENTMCNC: 32.6 G/DL (ref 32.3–35.5)
MCV RBC AUTO: 99.8 FL (ref 79.4–94.8)
MONOCYTES ABSOLUTE: 0.45 K/UL (ref 0.24–0.82)
MONOCYTES RELATIVE PERCENT: 6.4 % (ref 4.7–12.5)
NEUTROPHILS ABSOLUTE: 3.75 K/UL (ref 1.56–6.13)
NEUTROPHILS RELATIVE PERCENT: 53.7 % (ref 34–71.1)
PDW BLD-RTO: 12.8 % (ref 11.7–14.4)
PLATELET # BLD: 223 K/UL (ref 182–369)
PMV BLD AUTO: 10.3 FL (ref 7.4–10.4)
POTASSIUM SERPL-SCNC: 3.9 MMOL/L (ref 3.5–5.1)
RBC # BLD: 4.18 M/UL (ref 3.93–5.22)
SODIUM BLD-SCNC: 139 MMOL/L (ref 137–145)
TOTAL PROTEIN: 7.1 G/DL (ref 6.3–8.2)
WBC # BLD: 6.99 K/UL (ref 3.98–10.04)

## 2022-08-17 PROCEDURE — 36415 COLL VENOUS BLD VENIPUNCTURE: CPT

## 2022-08-17 PROCEDURE — 96372 THER/PROPH/DIAG INJ SC/IM: CPT

## 2022-08-17 PROCEDURE — 3017F COLORECTAL CA SCREEN DOC REV: CPT | Performed by: INTERNAL MEDICINE

## 2022-08-17 PROCEDURE — 6360000002 HC RX W HCPCS: Performed by: INTERNAL MEDICINE

## 2022-08-17 PROCEDURE — 4004F PT TOBACCO SCREEN RCVD TLK: CPT | Performed by: INTERNAL MEDICINE

## 2022-08-17 PROCEDURE — 96402 CHEMO HORMON ANTINEOPL SQ/IM: CPT

## 2022-08-17 PROCEDURE — G8417 CALC BMI ABV UP PARAM F/U: HCPCS | Performed by: INTERNAL MEDICINE

## 2022-08-17 PROCEDURE — 85025 COMPLETE CBC W/AUTO DIFF WBC: CPT

## 2022-08-17 PROCEDURE — G8428 CUR MEDS NOT DOCUMENT: HCPCS | Performed by: INTERNAL MEDICINE

## 2022-08-17 PROCEDURE — 80053 COMPREHEN METABOLIC PANEL: CPT

## 2022-08-17 PROCEDURE — G8400 PT W/DXA NO RESULTS DOC: HCPCS | Performed by: INTERNAL MEDICINE

## 2022-08-17 PROCEDURE — 1090F PRES/ABSN URINE INCON ASSESS: CPT | Performed by: INTERNAL MEDICINE

## 2022-08-17 PROCEDURE — 99214 OFFICE O/P EST MOD 30 MIN: CPT | Performed by: INTERNAL MEDICINE

## 2022-08-17 PROCEDURE — 1123F ACP DISCUSS/DSCN MKR DOCD: CPT | Performed by: INTERNAL MEDICINE

## 2022-08-17 RX ORDER — LAMOTRIGINE 25 MG/1
500 TABLET ORAL ONCE
Status: COMPLETED | OUTPATIENT
Start: 2022-08-17 | End: 2022-08-17

## 2022-08-17 RX ADMIN — FULVESTRANT 500 MG: 50 INJECTION, SOLUTION INTRAMUSCULAR at 12:42

## 2022-09-12 NOTE — PROGRESS NOTES
MEDICAL ONCOLOGY PROGRESS NOTE    Pt Name: Johnny Dotson  MRN: [id number]  YOB: 1955  Date of evaluation: 9/14/2022    HISTORY OF PRESENT ILLNESS:    The patient is currently receiving adjuvant Faslodex. She is also being seen by breast/plastic surgeon in [facility]. There is a plan for adjuvant radiation afterwards. Her case has been presented at [facility]. They recommended to proceed with Faslodex, surgery followed by adjuvant radiation and adjuvant. No need for Oncotype DX. Apparently, she was also suggest to add CDK 4/6 inhibitor. I do not know any data supporting this recommendation. I recommend the patient to continue with Faslodex only. Diagnosis  Invasive lobular carcinoma, left breast cancer 2002  Tubular carcinoma, right breast, 2006  Low grade  Infiltrating ductal carcinoma, right breast, March 2016  Grade 1  ER 99%, ID 91%, HER-2 2+/equivocal, FISH-negative  Infiltrating ductal carcinoma, right breast, March 2018  Grade 1  ER 90.86%, ID 89.29%, HER-2 1+/negative  Onctoype DX recurrence score: 4, 2018  Infiltrating ductal carcinoma, right breast, Feb 2021  Grade 1   ER 98%, %, HER-2 1+/negative. BRCA negative, 2018   (A) ER 50.95%,ID 74.77%,HER2 1+ Negative; (B) ER 41.80,ID 78.95 HER2 1+ Negative  Meghan 81 gene genetic panel: Negative    Treatment Summary  2002 Left lumpectomy  2002 MammoSite radiation therapy  2002 Tamoxifen, Arimidex tried, but unable to tolerate adjuvant estrogen blocking therapy  2007 - 2012 Faslodex x5 years  4/25/16 Bilateral skin sparing mastectomy with right negative SLNB  2016 [place]on, Aromasin  4/24/18 Right breast lumpectomy  May 2018 - Aug 2019 Arimidex  Aug 2019 - March 2020 Tamoxifen x3 months.  Discontinued due to side effects  Feb 2021 Letrozole-unable to take due to side effects  1/11/22 Arimidex 1mg daily  5/25/22 Faslodex every 4 weeks    Cancer History  The patient has a very long history of left/right breast cancer breast lumpectomy [facility]): Two foci of infiltrating ductal carcinoma, low grade (6 mm and 3 mm). Surgical margins are negative. April 2018 Eric Signs DX recurrence score: 4  May 2018 - Aug 2019 Arimidex  8/27/18 Bone density ([address]): Normal. Lumbar spine T-score -0.6. Left hi T-score -0.8 falling in the range of normal.   4/10/19 CT abd/pelvis [facility]): No acute abnormality or obvious etiology for patient's symptoms seen. Aug 2019 - March 2020 Tamoxifen x3 months. Discontinued due to side effects  1/14/21 Right diagnostic mammogram [facility]): Ill-defined lesion in the 7 o'clock location of the postmastectomy right breast is localized to the skin. Given the patient's prior significant history of bilateral breast malignancy and recurrence to the postmastectomy right breast, findings are viewed with suspicion. As a result, surgical consultation is recommended for referral for evaluation and possible excision/skin punch biopsy of the lesion for further evaluation. 1/14/21 US right breast [facility]): Focused ultrasound of the postmastectomy right breast at 3 o'clock, 7 cm from the nipple demonstrates an ill-defined area of hypoechogenicity localized within the skin that measures up to 3 mm. There is no actual mass identified within   the postmastectomy breast tissues. 1/26/21 Right diagnostic mammogram [facility]): There are findings consistent with prior mastectomy with silicone implant reconstruction. A surgical clip is identified in the lower left axillary region. No dominant masses, suspicious calcifications, or areas of nonsurgical architectural distortion are seen. Within the posterior outer left breast, subjacent to the skin marker, there is a questioned area of soft tissue density which may correspond with the questioned palpable finding. Further evaluation was performed with ultrasound. 1/26/21 US [facility] breast [facility]): Targeted left breast sonography was performed. Left 3:00, 8 cm to 12 cm from the nipple, at site of the patient's palpable ability: There is an oval, parallel, hypoechoic/mixed echogenic mass measuring 3.3 cm. It demonstrates a primarily hypoechoic appearance with a questioned hyperechoic rim and a linear area of increased echogenicity within it. It possibly represents an area of fat necrosis or postsurgical change related to her prior tightening procedure, possibly related to sutures or mesh. Feb 2021 Letrozole-unable to take due to side effects  2/1/21 Breast, right, excisional biopsy [facility]): Infiltrating ductal carcinoma, grade 1 (of 3). Margins are negative for invasive malignancy. Breast, left, excisional biopsy: Fibrosis with foreign body giant cell reaction. Benign skeletal muscle. Negative for malignancy. COMMENT:  There is a focus of invasive ductal carcinoma measuring 0.3 cm located in the dermis and extending into the underlying subcutaneous tissue. There is no involvement of the epidermis. ER 98%, %, HER-2 1+/negative   5/25/21 CA 27-29 18.4  6/8/21 CT chest/abd/pelvis ([facility]): Redemonstrated prominence of the common bile duct and the intrahepatic biliary ducts, which was seen dating back to 2019. Patient is status post cholecystectomy. The liver otherwise shows no focal abnormality. Negative for evidence of metastatic disease elsewhere within the chest, abdomen, or pelvis. 11/4/21 CT chest/abd/pelvis [facility]): Stable exam without evidence for residual or recurrent disease. Postoperative changes in the stomach. Stable intrahepatic and extrahepatic biliary ductal dilatation. 11/21/21 Right breast, 6:00 lesion, excisional biopsy [facility]) : Invasive ductal carcinoma. Size of at least 0.5 cm. Margins appear uninvolved. Right breast, 3:00 lesion, excision: Invasive ductal carcinoma. Size at least 0.5 cm. Margins appear uninvolved.  ER 94.54%, PA 96.75%, HER-2 1+/negative  11/12/2021 laparoscopic right hemicolectomy: Showed polyps with multiple fragments of tubulovillous adenoma no high-grade dysplasia. No malignancy no lymph node involvement. 1/11/22 Initiated Arimidex 1mg daily  4/8/22 MRI left shoulder [facility]): Moderate supraspinatus and infraspinatus tendinosis with associated bursitis. Mild shoulder and AC joint osteoarthrosis. No evidence of metastatic disease to the shoulder. 5/10/22 US bilateral breast [facility]): No sonographic evidence of malignancy seen in either breast. Recommend high risk screening as per plan. 5/25/22 Initiated Faslodex every 4 weeks  6/9/22 CA 27-29 18.2  6/24/22 CT chest/abd/pelvis [facility]): No evidence of residual or recurrent disease. Persistent diffuse dilation of the common bile duct and intrahepatic biliary ducts. 7/13/2022 NM PET CT Skull Base to Mid Thigh([facility]) No evidence of disease. 7/13/2022 MRI Cholangiogram [facility]) Marked intra and extrahepatic biliary ductal dilation likely represents a normal post cholecystectomy appearance for this patient. No pancreatic head or periampullary mass or choledocholithiasis is seen. 7/25/2022-she is planning to have another surgery tomorrow. She is currently on Faslodex, off label due to poor tolerance on AI/tamoxifen in the past.  7/26/2022 [facility]) Right breast lesion at 1:00: Skin with attached fibrofatty tissue. No malignancy noted. Surgical margins are unremarkable. Right breast lesion at 3 o'clock: Infiltrating ductal carcinoma, grade 2/3. Lesion extends to surgical margin. ER:Positive (50.95%, Strong) MA:Positive (74.77%, Strong) HER2/ROBBIE TISSUE ASSAY-PARAFFIN RESULTS:1+ Negative. Right breast lesion at 5 o'clock: Infiltrating ductal carcinoma, grade 2/3. Lesion extends to surgical margin. ER: Positive (41.80%, Strong) MA:Positive (78.95%, Strong) HER2/ROBBIE TISSUE ASSAY-PARAFFIN RESULTS:1+ Negative.    08/11/2022- the patient again had another excisional biopsy performed. Margins positive. Patient is current on Faslodex. She was seen by her plastic surgeon. They suggested a second opinion with a local oncologist for the case to be discussed at tumor board. I believe this is an excellent idea. I requested patient to get a second opinion/consultation. Meghan 81 gene genetic panel: Negative  8/25/2022 MRI Bilateral Breast W WO Contrast [facility]) Limited examination due to patient motion, but no MRI evidence of malignancy in the bilateral postmastectomy breasts. Development of noose/key hole sign involving the lateral aspect of the left-sided silicone capsule. This invagination of the silicone implant capsule is most consistent with a small intracapsular rupture. Right silicone implant appears intact. Appropriate management is recommended. 9/14/2022-I reviewed notes from medical oncology, radiation oncology and plastic surgery. The plan is to continue with Faslodex and proceed with surgery and adjuvant radiation. No need for Oncotype. The case was discussed at [facility].         Past Medical History:    Past Medical History:   Diagnosis Date    Basal cell adenocarcinoma     Breast cancer [facility])        Past Surgical History:    Past Surgical History:   Procedure Laterality Date    BREAST LUMPECTOMY Right 04/24/2018    BREAST LUMPECTOMY Left 2002    COLON SURGERY         Social History:    Marital status:  Smoking status:No  ETOH status:Occasionally; 1 monthly  Resides: [place]    Family History:   Family History   Problem Relation Age of Onset    Breast Cancer Mother 68    Cancer Paternal Aunt 68        Ovarian       Current Hospital Medications:    Current Outpatient Medications   Medication Sig Dispense Refill    doxepin (SINEQUAN) 25 MG capsule 25 mg nightly      Calcium-Phosphorus-Vitamin D 250-107-500 MG-MG-UNIT CHEW Take 1 tablet by mouth every evening      vitamin D (CHOLECALCIFEROL) 25 MCG (1000 UT) TABS tablet Take 1,000 Units by mouth daily      Collagen-Vitamin C 1000-10 MG TABS Take 1 tablet by mouth daily      cyanocobalamin 1000 MCG tablet Take 1,000 mcg by mouth daily      Multiple Vitamins-Minerals (MULTIVITAMIN ADULTS 50+) TABS Take 1 tablet by mouth daily      ketoconazole (NIZORAL) 2 % shampoo APPLY AS SHAMPOO USE AS DIRECTED      methylPREDNISolone (MEDROL DOSEPACK) 4 MG tablet TAKE 6 TABLETS ON DAY 1 AS DIRECTED ON PACKAGE AND DECREASE BY 1 TAB EACH DAY FOR A TOTAL OF 6 DAYS      fluocinonide (LIDEX) 0.05 % external solution Apply 0.5 % topically as needed Apply topically 2 times daily. No current facility-administered medications for this visit. Facility-Administered Medications Ordered in Other Visits   Medication Dose Route Frequency Provider Last Rate Last Admin    fulvestrant (FASLODEX) IM injection 500 mg  500 mg IntraMUSCular Once Marli Hawley MD           Allergies:    Allergies   Allergen Reactions    Penicillins Hives and Itching    Other Diarrhea and Nausea Only     sweating         Subjective   REVIEW OF SYSTEMS:   CONSTITUTIONAL: no fever, no night sweats, no fatigue;  HEENT: no blurring of vision, no double vision, no hearing difficulty, no tinnitus, no ulceration, no dysplasia, no epistaxis;   LUNGS: no cough, no hemoptysis, no wheeze,  no shortness of breath;  CARDIOVASCULAR: no palpitation, no chest pain, no shortness of breath;  GI: no abdominal pain, no nausea, no vomiting, no diarrhea, no constipation;  FRANCINE: no dysuria, no hematuria, no frequency or urgency, no nephrolithiasis;  MUSCULOSKELETAL:pain in left hip, no joint pain, no swelling, no stiffness;  ENDOCRINE: no polyuria, no polydipsia, no cold or heat intolerance;  HEMATOLOGY: no easy bruising or bleeding, no history of clotting disorder;  DERMATOLOGY: no skin rash, no eczema, no pruritus;  PSYCHIATRY: no depression, no anxiety, no panic attacks, no suicidal ideation, no homicidal ideation;  NEUROLOGY: no syncope, no seizures, no numbness or tingling of hands, no numbness or tingling of feet, no paresis;      Objective   /70   Pulse 74   Ht 5' 3\" (1.6 m)   Wt 168 lb (76.2 kg)   SpO2 99%   BMI 29.76 kg/m²     PHYSICAL EXAM:  CONSTITUTIONAL: Alert, appropriate, no acute distress  EYES: Non icteric, EOM intact, pupils equal round   ENT: Mucus membranes moist, no oral pharyngeal lesions, external inspection of ears and nose are normal.  NECK: Supple, no masses. No palpable thyroid mass  CHEST/LUNGS: CTA bilaterally, normal respiratory effort   CARDIOVASCULAR: RRR, no murmurs. No lower extremity edema  ABDOMEN: soft non-tender, active bowel sounds, no HSM. No palpable masses  EXTREMITIES: warm, full ROM in all 4 extremities, no focal weakness. SKIN: warm, dry with no rashes or lesions  LYMPH: No cervical, clavicular, axillary, or inguinal lymphadenopathy  NEUROLOGIC: follows commands, non focal   PSYCH: mood and affect appropriate. Alert and oriented to time, place, person      LABORATORY RESULTS REVIEWED/ANALYZED BY ME:  8/17/2022 Meghan 81 gene genetic panel: Negative    9/14/2022 CBC  WBC 8.06  HGB 13.5    Neut 5.46    RADIOLOGY STUDIES REVIEWED BY ME:  8/25/2022 MRI Bilateral Breast W WO Contrast [facility]) Limited examination due to patient motion, but no MRI evidence of malignancy in the bilateral postmastectomy breasts. Development of noose/key hole sign involving the lateral aspect of the left-sided silicone capsule. This invagination of the silicone implant capsule is most consistent with a small intracapsular rupture. Right silicone implant appears intact. Appropriate management is recommended. ACR BI-RADS Category 2: Benign. ASSESSMENT:    No orders of the defined types were placed in this encounter. Racheal Garcia was seen today for follow-up.     Diagnoses and all orders for this visit:    Infiltrating ductal carcinoma ([facility] [address]    Local recurrence of cancer of right breast [facility])    Care plan discussed with patient         Local recurrence/second primary IDC right breast ER/VT positive, HER2 negative  -Patient underwent excision of right breast lesion 7/26/2022.  -7/26/2022 [facility]) Right breast lesion at 1:00: Skin with attached fibrofatty tissue. No malignancy noted. Surgical margins are unremarkable. Right breast lesion at 3 o'clock: Infiltrating ductal carcinoma, grade 2/3. Lesion extends to surgical margin. ER:Positive (50.95%, Strong) RI:Positive (74.77%, Strong) HER2/ROBBIE TISSUE ASSAY-PARAFFIN RESULTS:1+ Negative. Right breast lesion at 5 o'clock: Infiltrating ductal carcinoma, grade 2/3. Lesion extends to surgical margin. ER: Positive (41.80%, Strong) RI:Positive (78.95%, Strong) HER2/ROBBIE TISSUE ASSAY-PARAFFIN RESULTS:1+ Negative.   -Unfortunately, no dimensions were provided. The lesion is grade 2/3. The lesion is ER/RI positive but less intense than the prior lesions.  -Continue Faslodex for the time being. The patient has tolerated prior therapies with tamoxifen or aromatase inhibitors very poorly. -She has started Faslodex about 4 months ago. Therefore I cannot say that this is a failure of her Faslodex. In addition, she has no other reasonable option as endocrine therapy.  -I agree with seeing an oncologist locally to have case discussed at tumor board. I do not know if she will benefit from radiation or chemotherapy at this time. Case was discussed at [facility]. The patient was seen by medical oncology, radiation oncology and plastic surgery    The plan is to proceed with surgery, adjuvant radiation and continue Faslodex only. I disagree with adding CDK 4/6. There is no data to support this. Genetic assessment she had a BRCA 1/2 test performed in the past.  This was negative. I have recommended her more comprehensive genetic test.  We will order genetic test during next visit.     -8/17/22 Meghan 81 gene genetic panel: Negative    PLAN:  RTC with MD 4 weeks  Will proceed with Arvin Patel for second opinion  Continue Faslodex every 4 weeks  Proceed with surgery  Consider radiation therapy after surgery     I, Eliezer Castillo, arabella pre charting  as Medical Assistant for Emilie Pacheco MD. Electronically signed by Eliezer Castillo MA on 9/14/2022 at 4:54 PM CDT. Leyla Tidwell am scribing as Medical Assistant for Emilie Pacheco MD. Electronically signed by Eliezer Castillo MA on 9/14/2022 at 11:47 AM CDT. I, Dr Rima Shabazz, personally performed the services described in this documentation as scribed by Eliezer Castillo MA in my presence and is both accurate and complete. I have seen, examined and reviewed this patient medication list, appropriate labs and imaging studies. I reviewed relevant medical records and others physicians notes. I discussed the plans of care with the patient. I answered all the questions to the patients satisfaction. I have also reviewed the chief complaint (CC) and part of the history (History of Present Illness (HPI), Past Family Social History [facility]), or Review of Systems (ROS) and made changes when appropriated.        (Please note that portions of this note were completed with a voice recognition program. Efforts were made to edit the dictations but occasionally words are mis-transcribed.)  Electronically signed by Emilie Pacheco MD on 9/14/2022 at 11:47 AM

## 2022-09-14 ENCOUNTER — HOSPITAL ENCOUNTER (OUTPATIENT)
Dept: INFUSION THERAPY | Age: 67
Discharge: HOME | End: 2022-09-14
Payer: MEDICARE

## 2022-09-14 ENCOUNTER — OFFICE VISIT (OUTPATIENT)
Dept: HEMATOLOGY | Age: 67
End: 2022-09-14
Payer: MEDICARE

## 2022-09-14 VITALS
BODY MASS INDEX: 29.77 KG/M2 | OXYGEN SATURATION: 99 % | HEART RATE: 74 BPM | DIASTOLIC BLOOD PRESSURE: 70 MMHG | HEIGHT: 63 IN | SYSTOLIC BLOOD PRESSURE: 120 MMHG | WEIGHT: 168 LBS

## 2022-09-14 DIAGNOSIS — Z79.818 USE OF FULVESTRANT (FASLODEX): ICD-10-CM

## 2022-09-14 DIAGNOSIS — C50.911 LOCAL RECURRENCE OF CANCER OF RIGHT BREAST (HCC): ICD-10-CM

## 2022-09-14 DIAGNOSIS — Z71.89 CARE PLAN DISCUSSED WITH PATIENT: ICD-10-CM

## 2022-09-14 DIAGNOSIS — C50.919 INFILTRATING DUCTAL CARCINOMA (HCC): ICD-10-CM

## 2022-09-14 DIAGNOSIS — R89.9 ABNORMAL LABORATORY TEST: Primary | ICD-10-CM

## 2022-09-14 DIAGNOSIS — C50.919 INFILTRATING DUCTAL CARCINOMA (HCC): Primary | ICD-10-CM

## 2022-09-14 LAB
BASOPHILS ABSOLUTE: 0.05 K/UL (ref 0.01–0.08)
BASOPHILS RELATIVE PERCENT: 0.6 % (ref 0.1–1.2)
EOSINOPHILS ABSOLUTE: 0.07 K/UL (ref 0.04–0.54)
EOSINOPHILS RELATIVE PERCENT: 0.9 % (ref 0.7–7)
HCT VFR BLD CALC: 43.7 % (ref 34.1–44.9)
HEMOGLOBIN: 13.5 G/DL (ref 11.2–15.7)
LYMPHOCYTES ABSOLUTE: 1.8 K/UL (ref 1.18–3.74)
LYMPHOCYTES RELATIVE PERCENT: 22.3 % (ref 19.3–53.1)
MCH RBC QN AUTO: 32.9 PG (ref 25.6–32.2)
MCHC RBC AUTO-ENTMCNC: 30.9 G/DL (ref 32.3–35.5)
MCV RBC AUTO: 106.6 FL (ref 79.4–94.8)
MONOCYTES ABSOLUTE: 0.66 K/UL (ref 0.24–0.82)
MONOCYTES RELATIVE PERCENT: 8.2 % (ref 4.7–12.5)
NEUTROPHILS ABSOLUTE: 5.46 K/UL (ref 1.56–6.13)
NEUTROPHILS RELATIVE PERCENT: 67.8 % (ref 34–71.1)
PDW BLD-RTO: 13.3 % (ref 11.7–14.4)
PLATELET # BLD: 263 K/UL (ref 182–369)
PMV BLD AUTO: 10.8 FL (ref 7.4–10.4)
RBC # BLD: 4.1 M/UL (ref 3.93–5.22)
WBC # BLD: 8.06 K/UL (ref 3.98–10.04)

## 2022-09-14 PROCEDURE — G8400 PT W/DXA NO RESULTS DOC: HCPCS | Performed by: INTERNAL MEDICINE

## 2022-09-14 PROCEDURE — 6360000002 HC RX W HCPCS: Performed by: INTERNAL MEDICINE

## 2022-09-14 PROCEDURE — G8417 CALC BMI ABV UP PARAM F/U: HCPCS | Performed by: INTERNAL MEDICINE

## 2022-09-14 PROCEDURE — 99212 OFFICE O/P EST SF 10 MIN: CPT

## 2022-09-14 PROCEDURE — 4004F PT TOBACCO SCREEN RCVD TLK: CPT | Performed by: INTERNAL MEDICINE

## 2022-09-14 PROCEDURE — 1123F ACP DISCUSS/DSCN MKR DOCD: CPT | Performed by: INTERNAL MEDICINE

## 2022-09-14 PROCEDURE — G8428 CUR MEDS NOT DOCUMENT: HCPCS | Performed by: INTERNAL MEDICINE

## 2022-09-14 PROCEDURE — 99214 OFFICE O/P EST MOD 30 MIN: CPT | Performed by: INTERNAL MEDICINE

## 2022-09-14 PROCEDURE — 1090F PRES/ABSN URINE INCON ASSESS: CPT | Performed by: INTERNAL MEDICINE

## 2022-09-14 PROCEDURE — 96402 CHEMO HORMON ANTINEOPL SQ/IM: CPT

## 2022-09-14 PROCEDURE — 3017F COLORECTAL CA SCREEN DOC REV: CPT | Performed by: INTERNAL MEDICINE

## 2022-09-14 PROCEDURE — 96372 THER/PROPH/DIAG INJ SC/IM: CPT

## 2022-09-14 PROCEDURE — 85025 COMPLETE CBC W/AUTO DIFF WBC: CPT

## 2022-09-14 RX ORDER — KETOCONAZOLE 20 MG/ML
SHAMPOO TOPICAL
COMMUNITY
Start: 2022-04-05

## 2022-09-14 RX ORDER — FAMOTIDINE 10 MG/ML
20 INJECTION, SOLUTION INTRAVENOUS
Status: CANCELLED | OUTPATIENT
Start: 2022-09-14

## 2022-09-14 RX ORDER — ONDANSETRON 2 MG/ML
8 INJECTION INTRAMUSCULAR; INTRAVENOUS
Status: CANCELLED | OUTPATIENT
Start: 2022-09-14

## 2022-09-14 RX ORDER — METHYLPREDNISOLONE 4 MG/1
TABLET ORAL
COMMUNITY
Start: 2022-09-08

## 2022-09-14 RX ORDER — PHENOL 1.4 %
1 AEROSOL, SPRAY (ML) MUCOUS MEMBRANE DAILY
COMMUNITY

## 2022-09-14 RX ORDER — SODIUM CHLORIDE 9 MG/ML
INJECTION, SOLUTION INTRAVENOUS CONTINUOUS
Status: CANCELLED | OUTPATIENT
Start: 2022-09-14

## 2022-09-14 RX ORDER — DIPHENHYDRAMINE HYDROCHLORIDE 50 MG/ML
50 INJECTION INTRAMUSCULAR; INTRAVENOUS
Status: CANCELLED | OUTPATIENT
Start: 2022-09-14

## 2022-09-14 RX ORDER — MEPERIDINE HYDROCHLORIDE 25 MG/ML
12.5 INJECTION INTRAMUSCULAR; INTRAVENOUS; SUBCUTANEOUS PRN
Status: CANCELLED | OUTPATIENT
Start: 2022-09-14

## 2022-09-14 RX ORDER — FLUOCINONIDE TOPICAL SOLUTION USP, 0.05% 0.5 MG/ML
0.5 SOLUTION TOPICAL PRN
COMMUNITY

## 2022-09-14 RX ORDER — ALBUTEROL SULFATE 90 UG/1
4 AEROSOL, METERED RESPIRATORY (INHALATION) PRN
Status: CANCELLED | OUTPATIENT
Start: 2022-09-14

## 2022-09-14 RX ORDER — DOXEPIN HYDROCHLORIDE 25 MG/1
25 CAPSULE ORAL NIGHTLY
COMMUNITY
Start: 2022-09-08

## 2022-09-14 RX ORDER — ACETAMINOPHEN 325 MG/1
650 TABLET ORAL
Status: CANCELLED | OUTPATIENT
Start: 2022-09-14

## 2022-09-14 RX ORDER — EPINEPHRINE 1 MG/ML
0.3 INJECTION, SOLUTION, CONCENTRATE INTRAVENOUS PRN
Status: CANCELLED | OUTPATIENT
Start: 2022-09-14

## 2022-09-14 RX ORDER — LAMOTRIGINE 25 MG/1
500 TABLET ORAL ONCE
Status: COMPLETED | OUTPATIENT
Start: 2022-09-14 | End: 2022-09-14

## 2022-09-14 RX ADMIN — FULVESTRANT 500 MG: 50 INJECTION, SOLUTION INTRAMUSCULAR at 11:11

## 2022-09-26 DIAGNOSIS — C50.919 INFILTRATING DUCTAL CARCINOMA (HCC): Primary | ICD-10-CM

## 2022-09-26 DIAGNOSIS — C50.911 LOCAL RECURRENCE OF CANCER OF RIGHT BREAST (HCC): ICD-10-CM

## 2022-09-27 ENCOUNTER — TELEPHONE (OUTPATIENT)
Dept: RADIATION ONCOLOGY | Facility: HOSPITAL | Age: 67
End: 2022-09-27

## 2022-09-30 PROBLEM — Z17.0 MALIGNANT NEOPLASM OF UPPER-OUTER QUADRANT OF RIGHT BREAST IN FEMALE, ESTROGEN RECEPTOR POSITIVE: Status: ACTIVE | Noted: 2017-03-15

## 2022-09-30 PROBLEM — C50.411 MALIGNANT NEOPLASM OF UPPER-OUTER QUADRANT OF RIGHT BREAST IN FEMALE, ESTROGEN RECEPTOR POSITIVE: Status: ACTIVE | Noted: 2017-03-15

## 2022-10-12 NOTE — PROGRESS NOTES
therapy  2007 Tubular carcinoma, right breast, Low grade  2007 - 2012 Faslodex x5 years  5/24/13 CA 27-29 18.8  2/20/14 CA 27-29 16.8  3/2/15 CA 27-29 19.7  3/18/16 Right breast biopsies [facility]) : infiltrating low grade ductal carcinoma. ER 99%, SD 91%, HER-2 2+/equivocal, FISH-negative  4/25/16 Bilateral skin sparing mastectomy with right negative SLNB  4/25/16 Bilateral skin sparing mastectomy with right negative SLNB [facility]): Right breast mastectomy - infiltrating ductal carcinoma, low-grade (0.5 cm). No lymphovascular invasion noted. Focal calcifications identified. Tumor noted at the anterior margin. Left breast, mastectomy - unremarkable nipple and areola. Calcified cystic cavity noted. No evidence of malignancy identified. Margins are negative. Report Name: Breast Invasive Carcinoma. Right sentinel node biopsy - benign fat, no lymph node identified. 2016 [place], Aromasin  3/20/18 US right breast [facility]): 5 mm lesion in the right breast. Ultrasound-guided breast biopsy is recommended and will be scheduled. 3/27/18 Right breast 3:00 lump [facility]): Infiltrating ductal carcinoma, grade 1/3. ER 90.86%, SD 89.29%, HER-2 1+/negative  4/6/18 PET scan [facility]): There is a focal area of increased uptake within the cecum which could be physiologic or could be related to malignancy. Follow-up colonoscopy is recommended. No other sites of disease identified. 4/7/18 MRI bilateral breast [facility]): No evidence of adenopathy. Findings in both breasts are felt to be benign. ACR BIRADS Category 2-benign. Note that this patient does have biopsy of a known malignancy in the right breast at 3:00. No malignant features are demonstrated by MRI, however. 4/24/18 Right breast lumpectomy [facility]): Two foci of infiltrating ductal carcinoma, low grade (6 mm and 3 mm). Surgical margins are negative.    April 2018 Julia Other DX recurrence score: 4  May 2018 - Aug 2019 Arimidex  8/27/18 Bone density ([address]): Normal. Lumbar spine T-score -0.6. Left hi T-score -0.8 falling in the range of normal.   4/10/19 CT abd/pelvis [facility]): No acute abnormality or obvious etiology for patient's symptoms seen. Aug 2019 - March 2020 Tamoxifen x3 months. Discontinued due to side effects  1/14/21 Right diagnostic mammogram [facility]): Ill-defined lesion in the 7 o'clock location of the postmastectomy right breast is localized to the skin. Given the patient's prior significant history of bilateral breast malignancy and recurrence to the postmastectomy right breast, findings are viewed with suspicion. As a result, surgical consultation is recommended for referral for evaluation and possible excision/skin punch biopsy of the lesion for further evaluation. 1/14/21 US right breast [facility]): Focused ultrasound of the postmastectomy right breast at 3 o'clock, 7 cm from the nipple demonstrates an ill-defined area of hypoechogenicity localized within the skin that measures up to 3 mm. There is no actual mass identified within   the postmastectomy breast tissues. 1/26/21 Right diagnostic mammogram [facility]): There are findings consistent with prior mastectomy with silicone implant reconstruction. A surgical clip is identified in the lower left axillary region. No dominant masses, suspicious calcifications, or areas of nonsurgical architectural distortion are seen. Within the posterior outer left breast, subjacent to the skin marker, there is a questioned area of soft tissue density which may correspond with the questioned palpable finding. Further evaluation was performed with ultrasound. 1/26/21 US right breast [facility]): Targeted left breast sonography was performed. Left 3:00, 8 cm to 12 cm from the nipple, at site of the patient's palpable ability: There is an oval, parallel, hypoechoic/mixed echogenic mass measuring 3.3 cm.   It demonstrates a primarily hypoechoic appearance with a questioned hyperechoic rim and a linear area of increased echogenicity within it. It possibly represents an area of fat necrosis or postsurgical change related to her prior tightening procedure, possibly related to sutures or mesh. Feb 2021 Letrozole-unable to take due to side effects  2/1/21 Breast, right, excisional biopsy [facility]): Infiltrating ductal carcinoma, grade 1 (of 3). Margins are negative for invasive malignancy. Breast, left, excisional biopsy: Fibrosis with foreign body giant cell reaction. Benign skeletal muscle. Negative for malignancy. COMMENT:  There is a focus of invasive ductal carcinoma measuring 0.3 cm located in the dermis and extending into the underlying subcutaneous tissue. There is no involvement of the epidermis. ER 98%, %, HER-2 1+/negative   5/25/21 CA 27-29 18.4  6/8/21 CT chest[facility]abd[facility]pelvis ([facility]): Redemonstrated prominence of the common bile duct and the intrahepatic biliary ducts, which was seen dating back to 2019. Patient is status post cholecystectomy. The liver otherwise shows no focal abnormality. Negative for evidence of metastatic disease elsewhere within the chest, abdomen, or pelvis. 11/4/21 CT chest/abd/pelvis [facility]): Stable exam without evidence for residual or recurrent disease. Postoperative changes in the stomach. Stable intrahepatic and extrahepatic biliary ductal dilatation. 11/21/21 Right breast, 6:00 lesion, excisional biopsy [facility]) : Invasive ductal carcinoma. Size of at least 0.5 cm. Margins appear uninvolved. Right breast, 3:00 lesion, excision: Invasive ductal carcinoma. Size at least 0.5 cm. Margins appear uninvolved. ER 94.54%, WA 96.75%, HER-2 1+/negative  11/12/2021 laparoscopic right hemicolectomy: Showed polyps with multiple fragments of tubulovillous adenoma no high-grade dysplasia. No malignancy no lymph node involvement. 1/11/22 Initiated Arimidex 1mg daily  4/8/22 MRI left shoulder [facility]):  Moderate supraspinatus and infraspinatus tendinosis with associated bursitis. Mild shoulder and AC joint osteoarthrosis. No evidence of metastatic disease to the shoulder. 5/10/22 US bilateral breast [facility]): No sonographic evidence of malignancy seen in either breast. Recommend high risk screening as per plan. 5/25/22 Initiated Faslodex every 4 weeks  6/9/22 CA 27-29 18.2  6/24/22 CT chest/abd/pelvis [facility]): No evidence of residual or recurrent disease. Persistent diffuse dilation of the common bile duct and intrahepatic biliary ducts. 7/13/2022 NM PET CT Skull Base to Mid Thigh([facility]) No evidence of disease. 7/13/2022 MRI Cholangiogram [facility]) Marked intra and extrahepatic biliary ductal dilation likely represents a normal post cholecystectomy appearance for this patient. No pancreatic head or periampullary mass or choledocholithiasis is seen. 7/25/2022-she is planning to have another surgery tomorrow. She is currently on Faslodex, off label due to poor tolerance on AI/tamoxifen in the past.  7/26/2022 [facility]) Right breast lesion at 1:00: Skin with attached fibrofatty tissue. No malignancy noted. Surgical margins are unremarkable. Right breast lesion at 3 o'clock: Infiltrating ductal carcinoma, grade 2/3. Lesion extends to surgical margin. ER:Positive (50.95%, Strong) IA:Positive (74.77%, Strong) HER2/ROBBIE TISSUE ASSAY-PARAFFIN RESULTS:1+ Negative. Right breast lesion at 5 o'clock: Infiltrating ductal carcinoma, grade 2/3. Lesion extends to surgical margin. ER: Positive (41.80%, Strong) IA:Positive (78.95%, Strong) HER2/ROBBIE TISSUE ASSAY-PARAFFIN RESULTS:1+ Negative.    08/11/2022- the patient again had another excisional biopsy performed. Margins positive. Patient is current on Faslodex. She was seen by her plastic surgeon. They suggested a second opinion with a local oncologist for the case to be discussed at tumor board. I believe this is an excellent idea.   I requested patient to get a second opinion/consultation. Meghan 81 gene genetic panel: Negative  8/25/2022 MRI Bilateral Breast W WO Contrast [facility]) Limited examination due to patient motion, but no MRI evidence of malignancy in the bilateral postmastectomy breasts. Development of noose/key hole sign involving the lateral aspect of the left-sided silicone capsule. This invagination of the silicone implant capsule is most consistent with a small intracapsular rupture. Right silicone implant appears intact. Appropriate management is recommended. 9/14/2022-I reviewed notes from medical oncology, radiation oncology and plastic surgery. The plan is to continue with Faslodex and proceed with surgery and adjuvant radiation. No need for Oncotype. [facility]         Past Medical History:    Past Medical History:   Diagnosis Date    Basal cell adenocarcinoma     Breast cancer [facility])        Past Surgical History:    Past Surgical History:   Procedure Laterality Date    BREAST LUMPECTOMY Right 04/24/2018    BREAST LUMPECTOMY Left 2002    COLON SURGERY         Social History:    Marital status:  Smoking status:No  ETOH status:Occasionally; 1 monthly  Resides: [place]    Family History:   Family History   Problem Relation Age of Onset    Breast Cancer Mother 68    Cancer Paternal Aunt 68        Ovarian       Current Hospital Medications:    Current Outpatient Medications   Medication Sig Dispense Refill    doxepin (SINEQUAN) 25 MG capsule 25 mg nightly      Calcium-Phosphorus-Vitamin D 250-107-500 MG-MG-UNIT CHEW Take 1 tablet by mouth every evening      vitamin D (CHOLECALCIFEROL) 25 MCG (1000 UT) TABS tablet Take 1,000 Units by mouth daily      Collagen-Vitamin C 1000-10 MG TABS Take 1 tablet by mouth daily      cyanocobalamin 1000 MCG tablet Take 1,000 mcg by mouth daily      Multiple Vitamins-Minerals (MULTIVITAMIN ADULTS 50+) TABS Take 1 tablet by mouth daily      ketoconazole (NIZORAL) 2 % shampoo APPLY AS SHAMPOO USE AS DIRECTED      methylPREDNISolone (MEDROL DOSEPACK) 4 MG tablet TAKE 6 TABLETS ON DAY 1 AS DIRECTED ON PACKAGE AND DECREASE BY 1 TAB EACH DAY FOR A TOTAL OF 6 DAYS      fluocinonide (LIDEX) 0.05 % external solution Apply 0.5 % topically as needed Apply topically 2 times daily. No current facility-administered medications for this visit. Facility-Administered Medications Ordered in Other Visits   Medication Dose Route Frequency Provider Last Rate Last Admin    fulvestrant (FASLODEX) IM injection 500 mg  500 mg IntraMUSCular Once Gomez Hunt MD           Allergies:    Allergies   Allergen Reactions    Penicillins Hives and Itching    Other Diarrhea and Nausea Only     sweating         Subjective   REVIEW OF SYSTEMS:   CONSTITUTIONAL: no fever, no night sweats, fatigue;  HEENT: no blurring of vision, no double vision, no hearing difficulty, no tinnitus, no ulceration, no dysplasia, no epistaxis;   LUNGS: no cough, no hemoptysis, no wheeze,  no shortness of breath;  CARDIOVASCULAR: no palpitation, no chest pain, no shortness of breath;  GI: no abdominal pain, no nausea, no vomiting, no diarrhea, no constipation;  FRANCINE: no dysuria, no hematuria, no frequency or urgency, no nephrolithiasis;  MUSCULOSKELETAL: mild joint pain, no swelling, no stiffness;  ENDOCRINE: no polyuria, no polydipsia, no cold or heat intolerance;  HEMATOLOGY: no easy bruising or bleeding, no history of clotting disorder;  DERMATOLOGY: no skin rash, no eczema, no pruritus;  PSYCHIATRY: no depression, no anxiety, no panic attacks, no suicidal ideation, no homicidal ideation;  NEUROLOGY: no syncope, no seizures, no numbness or tingling of hands, no numbness or tingling of feet, no paresis;    Objective   BP (!) 148/80 (Site: Right Upper Arm, Position: Sitting)   Pulse 63   Ht 5' 3\" (1.6 m)   Wt 174 lb 14.4 oz (79.3 kg)   SpO2 98%   BMI 30.98 kg/m²     PHYSICAL EXAM:  CONSTITUTIONAL: Alert, appropriate, no acute distress  EYES: Non icteric, EOM intact, pupils equal round   ENT: Mucus membranes moist, no oral pharyngeal lesions, external inspection of ears and nose are normal.  NECK: Supple, no masses. No palpable thyroid mass  CHEST/LUNGS: CTA bilaterally, normal respiratory effort   CARDIOVASCULAR: RRR, no murmurs. No lower extremity edema  ABDOMEN: soft non-tender, active bowel sounds, no HSM. No palpable masses  EXTREMITIES: warm, full ROM in all 4 extremities, no focal weakness. SKIN: warm, dry with no rashes or lesions  LYMPH: No cervical, clavicular, axillary, or inguinal lymphadenopathy  NEUROLOGIC: follows commands, non focal   PSYCH: mood and affect appropriate. Alert and oriented to time, place, person      LABORATORY RESULTS REVIEWED/ANALYZED BY ME:  10/13/2022 CBC  WBC 4.96  HGB 11.9    Neut 2.84    RADIOLOGY STUDIES REVIEWED BY ME:  None      ASSESSMENT:    No orders of the defined types were placed in this encounter. Michoacano Ugarte was seen today for follow-up. Diagnoses and all orders for this visit:    Infiltrating ductal carcinoma ([address]    Local recurrence of cancer of right breast [facility])    Care plan discussed with patient    Chemotherapy management, encounter for    Use of fulvestrant (Faslodex)         Local recurrence/second primary IDC right breast ER/ME positive, HER2 negative  -Patient underwent excision of right breast lesion 7/26/2022.  -7/26/2022 [facility]) Right breast lesion at 1:00: Skin with attached fibrofatty tissue. No malignancy noted. Surgical margins are unremarkable. Right breast lesion at 3 o'clock: Infiltrating ductal carcinoma, grade 2/3. Lesion extends to surgical margin. ER:Positive (50.95%, Strong) ME:Positive (74.77%, Strong) HER2/ROBBIE TISSUE ASSAY-PARAFFIN RESULTS:1+ Negative. Right breast lesion at 5 o'clock: Infiltrating ductal carcinoma, grade 2/3. Lesion extends to surgical margin.  ER: Positive (41.80%, Strong) ME:Positive (78.95%, Strong) HER2/ROBBIE TISSUE ASSAY-PARAFFIN RESULTS:1+ Negative.   -Unfortunately, no dimensions were provided. The lesion is grade 2/3. The lesion is ER/OK positive but less intense than the prior lesions.  -Continue Faslodex for the time being. The patient has tolerated prior therapies with tamoxifen or aromatase inhibitors very poorly. -She has started Faslodex about 4 months ago. Therefore I cannot say that this is a failure of her Faslodex. In addition, she has no other reasonable option as endocrine therapy.  -I agree with seeing an oncologist locally to have case discussed at tumor board. I do not know if she will benefit from radiation or chemotherapy at this time. Case was discussed at [facility]. The patient was seen by medical oncology, radiation oncology and plastic surgery    The plan is to proceed with surgery, adjuvant radiation and continue Faslodex only. I disagree with adding CDK 4/6. There is no data to support this.    -Proceed with surgery next week  -Proceed with Faslodex today  -Proceed with consultation with RT in 3 weeks after surgery    Genetic assessment she had a BRCA 1/2 test performed in the past.  This was negative. I have recommended her more comprehensive genetic test.  We will order genetic test during next visit. -8/17/22 Meghan 81 gene genetic panel: Negative      PLAN:  RTC with MD 4 weeks  Continue Faslodex every 4 weeks  Proceed with surgery with Dr Susie Garcia follow-up with Dr Jluis Cary for radiation therapy after surgery, 11/10/22        Shari AGUILERA am pre charting  as Medical Assistant for Diane Johnson MD. Electronically signed by Shari Randle MA on 10/13/2022 at 7:54 AM CDT. Jeramy Walters am scribing for Diane Johnson MD. Electronically signed by Laura Haile RN on 10/13/2022 at 12:04 PM CDT.     I, Dr Huber Chaney, personally performed the services described in this documentation as scribed by Bill Cummings RN in my presence and is both accurate and complete. I have seen, examined and reviewed this patient medication list, appropriate labs and imaging studies. I reviewed relevant medical records and others physicians notes. I discussed the plans of care with the patient. I answered all the questions to the patients satisfaction. I have also reviewed the chief complaint (CC) and part of the history (History of Present Illness (HPI), Past Family Social History [facility]), or Review of Systems (ROS) and made changes when appropriated.        (Please note that portions of this note were completed with a voice recognition program. Efforts were made to edit the dictations but occasionally words are mis-transcribed.)  Electronically signed by Griffin Solomon MD on 10/13/2022 at 12:18 PM

## 2022-10-13 ENCOUNTER — OFFICE VISIT (OUTPATIENT)
Dept: HEMATOLOGY | Age: 67
End: 2022-10-13
Payer: MEDICARE

## 2022-10-13 ENCOUNTER — HOSPITAL ENCOUNTER (OUTPATIENT)
Dept: INFUSION THERAPY | Age: 67
Discharge: HOME | End: 2022-10-13
Payer: MEDICARE

## 2022-10-13 VITALS
HEART RATE: 63 BPM | SYSTOLIC BLOOD PRESSURE: 148 MMHG | HEIGHT: 63 IN | WEIGHT: 174.9 LBS | DIASTOLIC BLOOD PRESSURE: 80 MMHG | OXYGEN SATURATION: 98 % | BODY MASS INDEX: 30.99 KG/M2

## 2022-10-13 DIAGNOSIS — C50.919 INFILTRATING DUCTAL CARCINOMA (HCC): ICD-10-CM

## 2022-10-13 DIAGNOSIS — Z71.89 CARE PLAN DISCUSSED WITH PATIENT: ICD-10-CM

## 2022-10-13 DIAGNOSIS — R89.9 ABNORMAL LABORATORY TEST: Primary | ICD-10-CM

## 2022-10-13 DIAGNOSIS — C50.919 INFILTRATING DUCTAL CARCINOMA (HCC): Primary | ICD-10-CM

## 2022-10-13 DIAGNOSIS — Z79.818 USE OF FULVESTRANT (FASLODEX): ICD-10-CM

## 2022-10-13 DIAGNOSIS — C50.911 LOCAL RECURRENCE OF CANCER OF RIGHT BREAST (HCC): ICD-10-CM

## 2022-10-13 DIAGNOSIS — Z51.11 CHEMOTHERAPY MANAGEMENT, ENCOUNTER FOR: ICD-10-CM

## 2022-10-13 LAB
HCT VFR BLD CALC: 37 % (ref 34.1–44.9)
HEMOGLOBIN: 11.9 G/DL (ref 11.2–15.7)
LYMPHOCYTES ABSOLUTE: 1.64 K/UL (ref 1.18–3.74)
LYMPHOCYTES RELATIVE PERCENT: 33.1 % (ref 19.3–53.1)
MCH RBC QN AUTO: 32.8 PG (ref 25.6–32.2)
MCHC RBC AUTO-ENTMCNC: 32.2 G/DL (ref 32.3–35.5)
MCV RBC AUTO: 101.9 FL (ref 79.4–94.8)
MONOCYTES ABSOLUTE: 0.34 K/UL (ref 0.24–0.82)
MONOCYTES RELATIVE PERCENT: 6.9 % (ref 4.7–12.5)
NEUTROPHILS ABSOLUTE: 2.84 K/UL (ref 1.56–6.13)
NEUTROPHILS RELATIVE PERCENT: 57.2 % (ref 34–71.1)
PDW BLD-RTO: 12.6 % (ref 11.7–14.4)
PLATELET # BLD: 321 K/UL (ref 182–369)
PMV BLD AUTO: 9.2 FL (ref 7.4–10.4)
RBC # BLD: 3.63 M/UL (ref 3.93–5.22)
WBC # BLD: 4.96 K/UL (ref 3.98–10.04)

## 2022-10-13 PROCEDURE — 36415 COLL VENOUS BLD VENIPUNCTURE: CPT

## 2022-10-13 PROCEDURE — 85025 COMPLETE CBC W/AUTO DIFF WBC: CPT

## 2022-10-13 PROCEDURE — 4004F PT TOBACCO SCREEN RCVD TLK: CPT | Performed by: INTERNAL MEDICINE

## 2022-10-13 PROCEDURE — 96372 THER/PROPH/DIAG INJ SC/IM: CPT

## 2022-10-13 PROCEDURE — 1090F PRES/ABSN URINE INCON ASSESS: CPT | Performed by: INTERNAL MEDICINE

## 2022-10-13 PROCEDURE — 3017F COLORECTAL CA SCREEN DOC REV: CPT | Performed by: INTERNAL MEDICINE

## 2022-10-13 PROCEDURE — 6360000002 HC RX W HCPCS: Performed by: INTERNAL MEDICINE

## 2022-10-13 PROCEDURE — G8400 PT W/DXA NO RESULTS DOC: HCPCS | Performed by: INTERNAL MEDICINE

## 2022-10-13 PROCEDURE — 1123F ACP DISCUSS/DSCN MKR DOCD: CPT | Performed by: INTERNAL MEDICINE

## 2022-10-13 PROCEDURE — G8417 CALC BMI ABV UP PARAM F/U: HCPCS | Performed by: INTERNAL MEDICINE

## 2022-10-13 PROCEDURE — G8427 DOCREV CUR MEDS BY ELIG CLIN: HCPCS | Performed by: INTERNAL MEDICINE

## 2022-10-13 PROCEDURE — 99214 OFFICE O/P EST MOD 30 MIN: CPT | Performed by: INTERNAL MEDICINE

## 2022-10-13 PROCEDURE — 96402 CHEMO HORMON ANTINEOPL SQ/IM: CPT

## 2022-10-13 PROCEDURE — G8484 FLU IMMUNIZE NO ADMIN: HCPCS | Performed by: INTERNAL MEDICINE

## 2022-10-13 PROCEDURE — 99211 OFF/OP EST MAY X REQ PHY/QHP: CPT

## 2022-10-13 RX ORDER — EPINEPHRINE 1 MG/ML
0.3 INJECTION, SOLUTION, CONCENTRATE INTRAVENOUS PRN
OUTPATIENT
Start: 2022-10-13

## 2022-10-13 RX ORDER — ACETAMINOPHEN 325 MG/1
650 TABLET ORAL
OUTPATIENT
Start: 2022-10-13

## 2022-10-13 RX ORDER — MEPERIDINE HYDROCHLORIDE 25 MG/ML
12.5 INJECTION INTRAMUSCULAR; INTRAVENOUS; SUBCUTANEOUS PRN
OUTPATIENT
Start: 2022-10-13

## 2022-10-13 RX ORDER — ALBUTEROL SULFATE 90 UG/1
4 AEROSOL, METERED RESPIRATORY (INHALATION) PRN
OUTPATIENT
Start: 2022-10-13

## 2022-10-13 RX ORDER — DIPHENHYDRAMINE HYDROCHLORIDE 50 MG/ML
50 INJECTION INTRAMUSCULAR; INTRAVENOUS
OUTPATIENT
Start: 2022-10-13

## 2022-10-13 RX ORDER — FAMOTIDINE 10 MG/ML
20 INJECTION, SOLUTION INTRAVENOUS
OUTPATIENT
Start: 2022-10-13

## 2022-10-13 RX ORDER — SODIUM CHLORIDE 9 MG/ML
INJECTION, SOLUTION INTRAVENOUS CONTINUOUS
OUTPATIENT
Start: 2022-10-13

## 2022-10-13 RX ORDER — LAMOTRIGINE 25 MG/1
500 TABLET ORAL ONCE
Status: COMPLETED | OUTPATIENT
Start: 2022-10-13 | End: 2022-10-13

## 2022-10-13 RX ORDER — ONDANSETRON 2 MG/ML
8 INJECTION INTRAMUSCULAR; INTRAVENOUS
OUTPATIENT
Start: 2022-10-13

## 2022-10-13 RX ADMIN — FULVESTRANT 500 MG: 50 INJECTION, SOLUTION INTRAMUSCULAR at 12:20

## 2022-11-08 ENCOUNTER — DOCUMENTATION (OUTPATIENT)
Dept: RADIATION ONCOLOGY | Facility: HOSPITAL | Age: 67
End: 2022-11-08

## 2022-11-08 PROBLEM — Z90.13 S/P BILATERAL MASTECTOMY: Status: ACTIVE | Noted: 2022-11-08

## 2022-11-08 PROBLEM — Z98.890 S/P LYMPH NODE BIOPSY: Status: ACTIVE | Noted: 2022-11-08

## 2022-11-09 NOTE — PROGRESS NOTES
MEDICAL ONCOLOGY PROGRESS NOTE    Pt Name: Fausto De La Garza  MRN: [id number]  YOB: 1955  Date of evaluation: 11/10/2022    HISTORY OF PRESENT ILLNESS:    The patient is a pleasant 69years old female who has a diagnosis of local recurrence of her breast cancer status postmastectomy. She is now status post capsulectomy and resection of the breast nodules. Pathology consistent with residual multicentric tumor. She is healing well from surgery. She is currently on adjuvant Faslodex due to intolerance to tamoxifen and AI. She has an appoint with radiation oncology today to discuss adjuvant radiation. Diagnosis  Invasive lobular carcinoma, left breast cancer 2002  Tubular carcinoma, right breast, 2006  Low grade  Infiltrating ductal carcinoma, right breast, March 2016  Grade 1  ER 99%, MI 91%, HER-2 2+/equivocal, FISH-negative  Infiltrating ductal carcinoma, right breast, March 2018  Grade 1  ER 90.86%, MI 89.29%, HER-2 1+/negative  Onctoype DX recurrence score: 4, 2018  Infiltrating ductal carcinoma, right breast, Feb 2021  Grade 1   ER 98%, %, HER-2 1+/negative. BRCA negative, 2018   (A) ER 50.95%,MI 74.77%,HER2 1+ Negative; (B) ER 41.80,MI 78.95 HER2 1+ Negative  Meghan 81 gene genetic panel: Negative    Treatment Summary  2002 Left lumpectomy  2002 MammoSite radiation therapy  2002 Tamoxifen, Arimidex tried, but unable to tolerate adjuvant estrogen blocking therapy  2007 - 2012 Faslodex x5 years  4/25/16 Bilateral skin sparing mastectomy with right negative SLNB  2016 [place], Aromasin  4/24/18 Right breast lumpectomy  May 2018 - Aug 2019 Arimidex  Aug 2019 - March 2020 Tamoxifen x3 months.  Discontinued due to side effects  Feb 2021 Letrozole-unable to take due to side effects  1/11/22 Arimidex 1mg daily  5/25/22 Faslodex every 4 weeks  10/19/22 Right mastectomy flap excision by Tino Maldonado at [facility]  10/19/22 Total capsulectomy by Kemal Mckinley at [facility]   11/10/22 Anticipating radiation therapy     Cancer History  The patient has a very long history of left/right breast cancer over the last 2 decades. Further details as depicted below. 2002 Invasive lobular carcinoma, left breast cancer   2002 Tamoxifen, Arimidex tried, but unable to tolerate adjuvant estrogen blocking therapy  2002 MammoSite radiation therapy  2007 Tubular carcinoma, right breast, Low grade  2007 - 2012 Faslodex x5 years  5/24/13 CA 27-29 18.8  2/20/14 CA 27-29 16.8  3/2/15 CA 27-29 19.7  3/18/16 Right breast biopsies [facility]) : infiltrating low grade ductal carcinoma. ER 99%, NJ 91%, HER-2 2+/equivocal, FISH-negative  4/25/16 Bilateral skin sparing mastectomy with right negative SLNB  4/25/16 Bilateral skin sparing mastectomy with right negative SLNB [facility]): Right breast mastectomy - infiltrating ductal carcinoma, low-grade (0.5 cm). No lymphovascular invasion noted. Focal calcifications identified. Tumor noted at the anterior margin. Left breast, mastectomy - unremarkable nipple and areola. Calcified cystic cavity noted. No evidence of malignancy identified. Margins are negative. Report Name: Breast Invasive Carcinoma. Right sentinel node biopsy - benign fat, no lymph node identified. 2016 [place], Aromasin  3/20/18 US right breast [facility]): 5 mm lesion in the right breast. Ultrasound-guided breast biopsy is recommended and will be scheduled. 3/27/18 Right breast 3:00 lump [facility]): Infiltrating ductal carcinoma, grade 1/3. ER 90.86%, NJ 89.29%, HER-2 1+/negative  4/6/18 PET scan [facility]): There is a focal area of increased uptake within the cecum which could be physiologic or could be related to malignancy. Follow-up colonoscopy is recommended. No other sites of disease identified. 4/7/18 MRI bilateral breast [facility]): No evidence of adenopathy. Findings in both breasts are felt to be benign. ACR BIRADS Category 2-benign.  Note that this patient does have biopsy of a known malignancy in the right breast at 3:00. No malignant features are demonstrated by MRI, however. 4/24/18 Right breast lumpectomy [facility]): Two foci of infiltrating ductal carcinoma, low grade (6 mm and 3 mm). Surgical margins are negative. April 2018 Connie Vogt DX recurrence score: 4  May 2018 - Aug 2019 Arimidex  8/27/18 Bone density ([facility]): Normal. Lumbar spine T-score -0.6. Left hi T-score -0.8 falling in the range of normal.   4/10/19 CT abd/pelvis [facility]): No acute abnormality or obvious etiology for patient's symptoms seen. Aug 2019 - March 2020 Tamoxifen x3 months. Discontinued due to side effects  1/14/21 Right diagnostic mammogram [facility]): Ill-defined lesion in the 7 o'clock location of the postmastectomy right breast is localized to the skin. Given the patient's prior significant history of bilateral breast malignancy and recurrence to the postmastectomy right breast, findings are viewed with suspicion. As a result, surgical consultation is recommended for referral for evaluation and possible excision/skin punch biopsy of the lesion for further evaluation. 1/14/21 US right breast [facility]): Focused ultrasound of the postmastectomy right breast at 3 o'clock, 7 cm from the nipple demonstrates an ill-defined area of hypoechogenicity localized within the skin that measures up to 3 mm. There is no actual mass identified within   the postmastectomy breast tissues. 1/26/21 Right diagnostic mammogram [facility]): There are findings consistent with prior mastectomy with silicone implant reconstruction. A surgical clip is identified in the lower left axillary region. No dominant masses, suspicious calcifications, or areas of nonsurgical architectural distortion are seen. Within the posterior outer left breast, subjacent to the skin marker, there is a questioned area of soft tissue density which may correspond with the questioned palpable finding.   Further evaluation was performed with ultrasound. 1/26/21 US right breast [facility]): Targeted left breast sonography was performed. Left 3:00, 8 cm to 12 cm from the nipple, at site of the patient's palpable ability: There is an oval, parallel, hypoechoic/mixed echogenic mass measuring 3.3 cm. It demonstrates a primarily hypoechoic appearance with a questioned hyperechoic rim and a linear area of increased echogenicity within it. It possibly represents an area of fat necrosis or postsurgical change related to her prior tightening procedure, possibly related to sutures or mesh. Feb 2021 Letrozole-unable to take due to side effects  2/1/21 Breast, right, excisional biopsy [facility]): Infiltrating ductal carcinoma, grade 1 (of 3). Margins are negative for invasive malignancy. Breast, left, excisional biopsy: Fibrosis with foreign body giant cell reaction. Benign skeletal muscle. Negative for malignancy. COMMENT:  There is a focus of invasive ductal carcinoma measuring 0.3 cm located in the dermis and extending into the underlying subcutaneous tissue. There is no involvement of the epidermis. ER 98%, %, HER-2 1+/negative   5/25/21 CA 27-29 18.4  6/8/21 [facility] Redemonstrated prominence of the common bile duct and the intrahepatic biliary ducts, which was seen dating back to 2019. Patient is status post cholecystectomy. The liver otherwise shows no focal abnormality. Negative for evidence of metastatic disease elsewhere within the chest, abdomen, or pelvis. 11/4/21 CT chest/abd/pelvis [facility]): Stable exam without evidence for residual or recurrent disease. Postoperative changes in the stomach. Stable intrahepatic and extrahepatic biliary ductal dilatation. 11/21/21 Right breast, 6:00 lesion, excisional biopsy [facility]) : Invasive ductal carcinoma. Size of at least 0.5 cm. Margins appear uninvolved. Right breast, 3:00 lesion, excision: Invasive ductal carcinoma. Size at least 0.5 cm.  Margins appear uninvolved. ER 94.54%, MI 96.75%, HER-2 1+/negative  11/12/2021 laparoscopic right hemicolectomy: Showed polyps with multiple fragments of tubulovillous adenoma no high-grade dysplasia. No malignancy no lymph node involvement. 1/11/22 Initiated Arimidex 1mg daily  4/8/22 MRI left shoulder [facility] Moderate supraspinatus and infraspinatus tendinosis with associated bursitis. Mild shoulder and AC joint osteoarthrosis. No evidence of metastatic disease to the shoulder. 5/10/22 US bilateral breast [facility]): No sonographic evidence of malignancy seen in either breast. Recommend high risk screening as per plan. 5/25/22 Initiated Faslodex every 4 weeks  6/9/22 CA 27-29 18.2  6/24/22 CT chest/abd/pelvis [facility] ([place]): No evidence of residual or recurrent disease. Persistent diffuse dilation of the common bile duct and intrahepatic biliary ducts. 7/13/2022 NM PET CT Skull Base to Mid Thigh([facility]) No evidence of disease. 7/13/2022 MRI Cholangiogram [facility] ([place]) Marked intra and extrahepatic biliary ductal dilation likely represents a normal post cholecystectomy appearance for this patient. No pancreatic head or periampullary mass or choledocholithiasis is seen. 7/25/2022-she is planning to have another surgery tomorrow. She is currently on Faslodex, off label due to poor tolerance on AI/tamoxifen in the past.  7/26/2022 [facility] ([place]) Right breast lesion at 1:00: Skin with attached fibrofatty tissue. No malignancy noted. Surgical margins are unremarkable. Right breast lesion at 3 o'clock: Infiltrating ductal carcinoma, grade 2/3. Lesion extends to surgical margin. ER:Positive (50.95%, Strong) MI:Positive (74.77%, Strong) HER2/ROBBIE TISSUE ASSAY-PARAFFIN RESULTS:1+ Negative. Right breast lesion at 5 o'clock: Infiltrating ductal carcinoma, grade 2/3. Lesion extends to surgical margin. ER: Positive (41.80%, Strong) MI:Positive (78.95%, Strong) HER2/ROBBIE TISSUE ASSAY-PARAFFIN RESULTS:1+ Negative.   B. Infiltrating carcinoma noted in section B2, size is 6.5 mm in greatest dimension. C. Infiltrating carcinoma noted in section C2, size is 5 mm in greatest dimension. 08/11/2022- the patient again had another excisional biopsy performed. Margins positive. Patient is current on Faslodex. She was seen by her plastic surgeon. They suggested a second opinion with a local oncologist for the case to be discussed at tumor board. I believe this is an excellent idea. I requested patient to get a second opinion/consultation. Meghan 81 gene genetic panel: Negative  8/25/2022 MRI Bilateral Breast W WO Contrast [facility]) Limited examination due to patient motion, but no MRI evidence of malignancy in the bilateral postmastectomy breasts. Development of noose/key hole sign involving the lateral aspect of the left-sided silicone capsule. This invagination of the silicone implant capsule is most consistent with a small intracapsular rupture. Right silicone implant appears intact. Appropriate management is recommended. 9/14/2022-I reviewed notes from medical oncology, radiation oncology and plastic surgery. The plan is to continue with Faslodex and proceed with surgery and adjuvant radiation. No need for Oncotype. The case was discussed at [facility]. 10/19/22 Right mastectomy by Dr. Cintyha Alston at [facility] : Residual two foci of invasive ductal carcinoma, grade 1 (Tubule 2, pleomorphism 2, mitosis 1), located in the dermis. The tumor measures 1.5 mm and 1.4 mm, respectively. No lymphovascular invasion identified. Surgical margins are negative for malignancy (far away from resection margin). Seborrheic keratosis. Right superior/anterior margin: Adipose tissue, no malignancy identified. Right inferior/anterior margin: Adipose tissue, no malignancy identified. Breast implant: Gross examination only. Right breast implant capsule, excision: Fibrotic capsule and surrounding soft tissue, no malignancy identified. 11/10/22 Anticipating radiation therapy with  at [facility]        Past Medical History:    Past Medical History:   Diagnosis Date    Basal cell adenocarcinoma     Breast cancer [facility])        Past Surgical History:    Past Surgical History:   Procedure Laterality Date    BREAST LUMPECTOMY Right 04/24/2018    BREAST LUMPECTOMY Left 2002    COLON SURGERY         Social History:    Marital status:  Smoking status:No  ETOH status:Occasionally; 1 monthly  Resides: [place]    Family History:   Family History   Problem Relation Age of Onset    Breast Cancer Mother 68    Cancer Paternal Aunt 68        Ovarian       Current Hospital Medications:    Current Outpatient Medications   Medication Sig Dispense Refill    doxepin (SINEQUAN) 25 MG capsule 25 mg nightly      Calcium-Phosphorus-Vitamin D 250-107-500 MG-MG-UNIT CHEW Take 1 tablet by mouth every evening      vitamin D (CHOLECALCIFEROL) 25 MCG (1000 UT) TABS tablet Take 1,000 Units by mouth daily      Collagen-Vitamin C 1000-10 MG TABS Take 1 tablet by mouth daily      cyanocobalamin 1000 MCG tablet Take 1,000 mcg by mouth daily      Multiple Vitamins-Minerals (MULTIVITAMIN ADULTS 50+) TABS Take 1 tablet by mouth daily      ketoconazole (NIZORAL) 2 % shampoo APPLY AS SHAMPOO USE AS DIRECTED      fluocinonide (LIDEX) 0.05 % external solution Apply 0.5 % topically as needed Apply topically 2 times daily. methylPREDNISolone (MEDROL DOSEPACK) 4 MG tablet TAKE 6 TABLETS ON DAY 1 AS DIRECTED ON PACKAGE AND DECREASE BY 1 TAB EACH DAY FOR A TOTAL OF 6 DAYS       No current facility-administered medications for this visit. Facility-Administered Medications Ordered in Other Visits   Medication Dose Route Frequency Provider Last Rate Last Admin    fulvestrant (FASLODEX) IM injection 500 mg  500 mg IntraMUSCular Once Ansley Licea MD           Allergies:    Allergies   Allergen Reactions    Penicillins Hives and Itching    Other Diarrhea and Nausea Only Sweating      Bicitra         Subjective   REVIEW OF SYSTEMS:   CONSTITUTIONAL: no fever, no night sweats, no fatigue;  HEENT: no blurring of vision, no double vision, no hearing difficulty, no tinnitus, no ulceration, no dysplasia, no epistaxis;   LUNGS: no cough, no hemoptysis, no wheeze,  no shortness of breath;  CARDIOVASCULAR: no palpitation, no chest pain, no shortness of breath;  GI: no abdominal pain, nausea, no vomiting, no diarrhea, no constipation;  FRANCINE: no dysuria, no hematuria, no frequency or urgency, no nephrolithiasis;  MUSCULOSKELETAL: no joint pain, no swelling, no stiffness;  ENDOCRINE: no polyuria, no polydipsia, no cold or heat intolerance;  HEMATOLOGY: no easy bruising or bleeding, no history of clotting disorder;  DERMATOLOGY: no skin rash, no eczema, no pruritus;  PSYCHIATRY: no depression, no anxiety, no panic attacks, no suicidal ideation, no homicidal ideation;  NEUROLOGY: no syncope, no seizures, no numbness or tingling of hands, no numbness or tingling of feet, no paresis;     Objective   /70   Pulse 86   Wt 169 lb 11.2 oz (77 kg)   SpO2 98%   BMI 30.06 kg/m²     PHYSICAL EXAM:  CONSTITUTIONAL: Alert, appropriate, no acute distress  EYES: Non icteric, EOM intact, pupils equal round   ENT: Mucus membranes moist, no oral pharyngeal lesions, external inspection of ears and nose are normal.  NECK: Supple, no masses. No palpable thyroid mass  CHEST/LUNGS: CTA bilaterally, normal respiratory effort   CARDIOVASCULAR: RRR, no murmurs. No lower extremity edema  ABDOMEN: soft non-tender, active bowel sounds, no HSM. No palpable masses  EXTREMITIES: warm, full ROM in all 4 extremities, no focal weakness. SKIN: warm, dry with no rashes or lesions  LYMPH: No cervical, clavicular, axillary, or inguinal lymphadenopathy  NEUROLOGIC: follows commands, non focal   PSYCH: mood and affect appropriate.   Alert and oriented to time, place, person      LABORATORY RESULTS REVIEWED/ANALYZED BY ME:  10/19/22 Right mastectomy by Dr. Gissel Carrasco at [facility] : Residual two foci of invasive ductal carcinoma, grade 1 (Tubule 2, pleomorphism 2, mitosis 1), located in the dermis. The tumor measures 1.5 mm and 1.4 mm, respectively. No lymphovascular invasion identified. Surgical margins are negative for malignancy (far away from resection margin). Seborrheic keratosis. Right superior/anterior margin: Adipose tissue, no malignancy identified. Right inferior/anterior margin: Adipose tissue, no malignancy identified. Breast implant: Gross examination only. Right breast implant capsule, excision: Fibrotic capsule and surrounding soft tissue, no malignancy identified. Lab Results   Component Value Date    WBC 6.27 11/10/2022    HGB 12.4 11/10/2022    HCT 38.7 11/10/2022    .0 (H) 11/10/2022     11/10/2022     Lab Results   Component Value Date    NEUTROABS 3.00 11/10/2022     RADIOLOGY STUDIES REVIEWED BY ME:  None      ASSESSMENT:    No orders of the defined types were placed in this encounter. Marti Nielson was seen today for follow-up. Diagnoses and all orders for this visit:    Infiltrating ductal carcinoma ([facility] [address]    Local recurrence of cancer of right breast [facility])    Care plan discussed with patient    Use of fulvestrant (Faslodex)           Local recurrence/second primary IDC right breast ER/LA positive, HER2 negative  -Patient underwent excision of right breast lesion 7/26/2022.  -7/26/2022 [facility]) Right breast lesion at 1:00: Skin with attached fibrofatty tissue. No malignancy noted. Surgical margins are unremarkable. Right breast lesion at 3 o'clock: Infiltrating ductal carcinoma, grade 2/3. Lesion extends to surgical margin. ER:Positive (50.95%, Strong) LA:Positive (74.77%, Strong) HER2/ROBBIE TISSUE ASSAY-PARAFFIN RESULTS:1+ Negative. Right breast lesion at 5 o'clock: Infiltrating ductal carcinoma, grade 2/3. Lesion extends to surgical margin.  ER: Positive (41.80%, Strong) LA:Positive (78.95%, Strong) HER2/ROBBIE TISSUE ASSAY-PARAFFIN RESULTS:1+ Negative.   -Unfortunately, no dimensions were provided. The lesion is grade 2/3. The lesion is ER/IN positive but less intense than the prior lesions.  -Continue Faslodex for the time being. The patient has tolerated prior therapies with tamoxifen or aromatase inhibitors very poorly. -She has started Faslodex about 4 months ago. Therefore I cannot say that this is a failure of her Faslodex. In addition, she has no other reasonable option as endocrine therapy.  -I agree with seeing an oncologist locally to have case discussed at tumor board. I do not know if she will benefit from radiation or chemotherapy at this time. Case was discussed at MD [facility]. The patient was seen by medical oncology, radiation oncology and plastic surgery    The plan is to proceed with surgery, adjuvant radiation and continue Faslodex only. I disagree with adding CDK 4/6. There is no data to support this. 10/19/22 Right maste[facility] by [facility] Malika Ramirez [facility] : Residual two foci of invasive ductal carcinoma, grade 1 (Tubule 2, pleomorphism 2, mitosis 1), located in the dermis. The tumor measures 1.5 mm and 1.4 mm, respectively. No lymphovascular invasion identified. Surgical margins are negative for malignancy (far away from resection margin). Seborrheic keratosis. Right superior/anterior margin: Adipose tissue, no malignancy identified. Right inferior/anterior margin: Adipose tissue, no malignancy identified. Breast implant: Gross examination only. Right breast implant capsule, excision: Fibrotic capsule and surrounding soft tissue, no malignancy identified. Plan:  -Proceed with Faslodex today  -Proceed with consultation with RT     Genetic assessment she had a BRCA 1/2 test performed in the past.  This was negative. I have recommended her more comprehensive genetic test.  We will order genetic test during next visit.     -8/17/22 [facility] gene genetic panel: Negative      PLAN:  RTC with MD 8 weeks  Continue Faslodex every 4 weeks  Proceed with surgery with Dr Pat Sosa/[facility] General Surgery-10/19/22  Proceed with appointment with Dr Ansley Romero for radiation therapy after surgery-Radiation on HOLD for 90 days due to necessity of adequate expansion. Cade Castelan am pre charting  as Medical Assistant for Malick Duff MD. Electronically signed by Hari Nickerson MA on 11/10/2022 at 3:23 PM CST. Cade Castelan am scribing as Medical Assistant for Malick Duff MD. Electronically signed by Hari Nickerson MA on 11/10/2022 at 12:36 PM CST. I, Dr Sofia Moncada, personally performed the services described in this documentation as scribed by Hari Nickerson MA in my presence and is both accurate and complete. I have seen, examined and reviewed this patient medication list, appropriate labs and imaging studies. I reviewed relevant medical records and others physicians notes. I discussed the plans of care with the patient. I answered all the questions to the patients satisfaction. I have also reviewed the chief complaint (CC) and part of the history (History of Present Illness (HPI), Past Family Social History [facility]), or Review of Systems (ROS) and made changes when appropriated.        (Please note that portions of this note were completed with a voice recognition program. Efforts were made to edit the dictations but occasionally words are mis-transcribed.)  Electronically signed by Malick Duff MD on 11/10/2022 at 12:38 PM

## 2022-11-10 ENCOUNTER — HOSPITAL ENCOUNTER (OUTPATIENT)
Dept: INFUSION THERAPY | Age: 67
Discharge: HOME | End: 2022-11-10
Payer: MEDICARE

## 2022-11-10 ENCOUNTER — HOSPITAL ENCOUNTER (OUTPATIENT)
Dept: RADIATION ONCOLOGY | Facility: HOSPITAL | Age: 67
Setting detail: RADIATION/ONCOLOGY SERIES
End: 2022-11-10

## 2022-11-10 ENCOUNTER — OFFICE VISIT (OUTPATIENT)
Dept: HEMATOLOGY | Age: 67
End: 2022-11-10
Payer: MEDICARE

## 2022-11-10 ENCOUNTER — CONSULT (OUTPATIENT)
Dept: RADIATION ONCOLOGY | Facility: HOSPITAL | Age: 67
End: 2022-11-10

## 2022-11-10 VITALS
WEIGHT: 171 LBS | SYSTOLIC BLOOD PRESSURE: 145 MMHG | HEIGHT: 63 IN | DIASTOLIC BLOOD PRESSURE: 69 MMHG | BODY MASS INDEX: 30.3 KG/M2

## 2022-11-10 VITALS
HEART RATE: 86 BPM | BODY MASS INDEX: 30.06 KG/M2 | SYSTOLIC BLOOD PRESSURE: 122 MMHG | WEIGHT: 169.7 LBS | OXYGEN SATURATION: 98 % | DIASTOLIC BLOOD PRESSURE: 70 MMHG

## 2022-11-10 DIAGNOSIS — Z79.818 USE OF FULVESTRANT (FASLODEX): ICD-10-CM

## 2022-11-10 DIAGNOSIS — C50.919 INFILTRATING DUCTAL CARCINOMA (HCC): ICD-10-CM

## 2022-11-10 DIAGNOSIS — C50.919 INFILTRATING DUCTAL CARCINOMA (HCC): Primary | ICD-10-CM

## 2022-11-10 DIAGNOSIS — Z17.0 MALIGNANT NEOPLASM OF UPPER-OUTER QUADRANT OF RIGHT BREAST IN FEMALE, ESTROGEN RECEPTOR POSITIVE: Primary | ICD-10-CM

## 2022-11-10 DIAGNOSIS — Z98.890 S/P LYMPH NODE BIOPSY: ICD-10-CM

## 2022-11-10 DIAGNOSIS — C50.411 MALIGNANT NEOPLASM OF UPPER-OUTER QUADRANT OF RIGHT BREAST IN FEMALE, ESTROGEN RECEPTOR POSITIVE: Primary | ICD-10-CM

## 2022-11-10 DIAGNOSIS — R89.9 ABNORMAL LABORATORY TEST: Primary | ICD-10-CM

## 2022-11-10 DIAGNOSIS — Z90.13 S/P BILATERAL MASTECTOMY: ICD-10-CM

## 2022-11-10 DIAGNOSIS — C50.911 LOCAL RECURRENCE OF CANCER OF RIGHT BREAST (HCC): ICD-10-CM

## 2022-11-10 DIAGNOSIS — Z71.89 CARE PLAN DISCUSSED WITH PATIENT: ICD-10-CM

## 2022-11-10 LAB
BASOPHILS ABSOLUTE: 0.06 K/UL (ref 0.01–0.08)
BASOPHILS RELATIVE PERCENT: 1 % (ref 0.1–1.2)
EOSINOPHILS ABSOLUTE: 0.52 K/UL (ref 0.04–0.54)
EOSINOPHILS RELATIVE PERCENT: 8.3 % (ref 0.7–7)
HCT VFR BLD CALC: 38.7 % (ref 34.1–44.9)
HEMOGLOBIN: 12.4 G/DL (ref 11.2–15.7)
LYMPHOCYTES ABSOLUTE: 2.26 K/UL (ref 1.18–3.74)
LYMPHOCYTES RELATIVE PERCENT: 36 % (ref 19.3–53.1)
MCH RBC QN AUTO: 32.4 PG (ref 25.6–32.2)
MCHC RBC AUTO-ENTMCNC: 32 G/DL (ref 32.3–35.5)
MCV RBC AUTO: 101 FL (ref 79.4–94.8)
MONOCYTES ABSOLUTE: 0.42 K/UL (ref 0.24–0.82)
MONOCYTES RELATIVE PERCENT: 6.7 % (ref 4.7–12.5)
NEUTROPHILS ABSOLUTE: 3 K/UL (ref 1.56–6.13)
NEUTROPHILS RELATIVE PERCENT: 47.8 % (ref 34–71.1)
PDW BLD-RTO: 12.8 % (ref 11.7–14.4)
PLATELET # BLD: 280 K/UL (ref 182–369)
PMV BLD AUTO: 10.1 FL (ref 7.4–10.4)
RBC # BLD: 3.83 M/UL (ref 3.93–5.22)
WBC # BLD: 6.27 K/UL (ref 3.98–10.04)

## 2022-11-10 PROCEDURE — G8427 DOCREV CUR MEDS BY ELIG CLIN: HCPCS | Performed by: INTERNAL MEDICINE

## 2022-11-10 PROCEDURE — G8417 CALC BMI ABV UP PARAM F/U: HCPCS | Performed by: INTERNAL MEDICINE

## 2022-11-10 PROCEDURE — G0463 HOSPITAL OUTPT CLINIC VISIT: HCPCS | Performed by: RADIOLOGY

## 2022-11-10 PROCEDURE — G8484 FLU IMMUNIZE NO ADMIN: HCPCS | Performed by: INTERNAL MEDICINE

## 2022-11-10 PROCEDURE — 3017F COLORECTAL CA SCREEN DOC REV: CPT | Performed by: INTERNAL MEDICINE

## 2022-11-10 PROCEDURE — 1036F TOBACCO NON-USER: CPT | Performed by: INTERNAL MEDICINE

## 2022-11-10 PROCEDURE — 96372 THER/PROPH/DIAG INJ SC/IM: CPT

## 2022-11-10 PROCEDURE — 99214 OFFICE O/P EST MOD 30 MIN: CPT | Performed by: INTERNAL MEDICINE

## 2022-11-10 PROCEDURE — 1090F PRES/ABSN URINE INCON ASSESS: CPT | Performed by: INTERNAL MEDICINE

## 2022-11-10 PROCEDURE — 36415 COLL VENOUS BLD VENIPUNCTURE: CPT

## 2022-11-10 PROCEDURE — G8400 PT W/DXA NO RESULTS DOC: HCPCS | Performed by: INTERNAL MEDICINE

## 2022-11-10 PROCEDURE — 96402 CHEMO HORMON ANTINEOPL SQ/IM: CPT

## 2022-11-10 PROCEDURE — 6360000002 HC RX W HCPCS: Performed by: INTERNAL MEDICINE

## 2022-11-10 PROCEDURE — 85025 COMPLETE CBC W/AUTO DIFF WBC: CPT

## 2022-11-10 PROCEDURE — 1123F ACP DISCUSS/DSCN MKR DOCD: CPT | Performed by: INTERNAL MEDICINE

## 2022-11-10 PROCEDURE — 99212 OFFICE O/P EST SF 10 MIN: CPT

## 2022-11-10 RX ORDER — FLUOCINONIDE TOPICAL SOLUTION USP, 0.05% 0.5 MG/ML
1 SOLUTION TOPICAL AS NEEDED
COMMUNITY
Start: 2022-10-08

## 2022-11-10 RX ORDER — SODIUM CHLORIDE 9 MG/ML
INJECTION, SOLUTION INTRAVENOUS CONTINUOUS
OUTPATIENT
Start: 2022-11-10

## 2022-11-10 RX ORDER — ALBUTEROL SULFATE 90 UG/1
4 AEROSOL, METERED RESPIRATORY (INHALATION) PRN
OUTPATIENT
Start: 2022-11-10

## 2022-11-10 RX ORDER — ONDANSETRON 2 MG/ML
8 INJECTION INTRAMUSCULAR; INTRAVENOUS
OUTPATIENT
Start: 2022-11-10

## 2022-11-10 RX ORDER — MULTIPLE VITAMINS W/ MINERALS TAB 9MG-400MCG
1 TAB ORAL DAILY
COMMUNITY

## 2022-11-10 RX ORDER — EPINEPHRINE 1 MG/ML
0.3 INJECTION, SOLUTION, CONCENTRATE INTRAVENOUS PRN
OUTPATIENT
Start: 2022-11-10

## 2022-11-10 RX ORDER — DIPHENHYDRAMINE HYDROCHLORIDE 50 MG/ML
50 INJECTION INTRAMUSCULAR; INTRAVENOUS
OUTPATIENT
Start: 2022-11-10

## 2022-11-10 RX ORDER — LANOLIN ALCOHOL/MO/W.PET/CERES
1000 CREAM (GRAM) TOPICAL DAILY
COMMUNITY
Start: 2022-09-08

## 2022-11-10 RX ORDER — MEPERIDINE HYDROCHLORIDE 25 MG/ML
12.5 INJECTION INTRAMUSCULAR; INTRAVENOUS; SUBCUTANEOUS PRN
OUTPATIENT
Start: 2022-11-10

## 2022-11-10 RX ORDER — MELATONIN
1000 DAILY
COMMUNITY

## 2022-11-10 RX ORDER — FAMOTIDINE 10 MG/ML
20 INJECTION, SOLUTION INTRAVENOUS
OUTPATIENT
Start: 2022-11-10

## 2022-11-10 RX ORDER — ACETAMINOPHEN 325 MG/1
650 TABLET ORAL
OUTPATIENT
Start: 2022-11-10

## 2022-11-10 RX ADMIN — FULVESTRANT 500 MG: 50 INJECTION, SOLUTION INTRAMUSCULAR at 12:39

## 2022-12-08 ENCOUNTER — HOSPITAL ENCOUNTER (OUTPATIENT)
Dept: INFUSION THERAPY | Age: 67
Discharge: HOME | End: 2022-12-08
Payer: MEDICARE

## 2022-12-08 VITALS
SYSTOLIC BLOOD PRESSURE: 126 MMHG | OXYGEN SATURATION: 95 % | DIASTOLIC BLOOD PRESSURE: 70 MMHG | WEIGHT: 171.7 LBS | BODY MASS INDEX: 30.42 KG/M2 | HEART RATE: 68 BPM | HEIGHT: 63 IN

## 2022-12-08 DIAGNOSIS — R89.9 ABNORMAL LABORATORY TEST: Primary | ICD-10-CM

## 2022-12-08 DIAGNOSIS — C50.919 INFILTRATING DUCTAL CARCINOMA (HCC): ICD-10-CM

## 2022-12-08 LAB
BASOPHILS ABSOLUTE: 0.02 K/UL (ref 0.01–0.08)
BASOPHILS RELATIVE PERCENT: 0.5 % (ref 0.1–1.2)
EOSINOPHILS ABSOLUTE: 0.25 K/UL (ref 0.04–0.54)
EOSINOPHILS RELATIVE PERCENT: 6 % (ref 0.7–7)
HCT VFR BLD CALC: 36.3 % (ref 34.1–44.9)
HEMOGLOBIN: 11.3 G/DL (ref 11.2–15.7)
LYMPHOCYTES ABSOLUTE: 1.7 K/UL (ref 1.18–3.74)
LYMPHOCYTES RELATIVE PERCENT: 40.6 % (ref 19.3–53.1)
MCH RBC QN AUTO: 31.4 PG (ref 25.6–32.2)
MCHC RBC AUTO-ENTMCNC: 31.1 G/DL (ref 32.3–35.5)
MCV RBC AUTO: 100.8 FL (ref 79.4–94.8)
MONOCYTES ABSOLUTE: 0.27 K/UL (ref 0.24–0.82)
MONOCYTES RELATIVE PERCENT: 6.4 % (ref 4.7–12.5)
NEUTROPHILS ABSOLUTE: 1.95 K/UL (ref 1.56–6.13)
NEUTROPHILS RELATIVE PERCENT: 46.5 % (ref 34–71.1)
PDW BLD-RTO: 13.3 % (ref 11.7–14.4)
PLATELET # BLD: 317 K/UL (ref 182–369)
PMV BLD AUTO: 9.5 FL (ref 7.4–10.4)
RBC # BLD: 3.6 M/UL (ref 3.93–5.22)
WBC # BLD: 4.19 K/UL (ref 3.98–10.04)

## 2022-12-08 PROCEDURE — 85025 COMPLETE CBC W/AUTO DIFF WBC: CPT

## 2022-12-08 PROCEDURE — 6360000002 HC RX W HCPCS: Performed by: INTERNAL MEDICINE

## 2022-12-08 PROCEDURE — 96402 CHEMO HORMON ANTINEOPL SQ/IM: CPT

## 2022-12-08 PROCEDURE — 36415 COLL VENOUS BLD VENIPUNCTURE: CPT

## 2022-12-08 RX ORDER — LAMOTRIGINE 25 MG/1
500 TABLET ORAL ONCE
Status: COMPLETED | OUTPATIENT
Start: 2022-12-08 | End: 2022-12-08

## 2022-12-08 RX ADMIN — FULVESTRANT 500 MG: 50 INJECTION, SOLUTION INTRAMUSCULAR at 11:02

## 2023-01-02 ENCOUNTER — HOSPITAL ENCOUNTER (OUTPATIENT)
Dept: RADIATION ONCOLOGY | Facility: HOSPITAL | Age: 68
Setting detail: RADIATION/ONCOLOGY SERIES
End: 2023-01-02
Payer: MEDICARE

## 2023-01-03 NOTE — PROGRESS NOTES
MEDICAL ONCOLOGY PROGRESS NOTE    Pt Name: Melissa Paige  MRN: [id number]  YOB: 1955  Date of evaluation: 1/5/2023    HISTORY OF PRESENT ILLNESS:    The patient is a pleasant 69years old female who has a diagnosis of local recurrence of her breast cancer status postmastectomy. She is now status post capsulectomy and resection of the breast nodules. Pathology consistent with residual multicentric tumor. She is healing well from surgery. She is currently on adjuvant Faslodex due to intolerance to tamoxifen and AI. She is completing treatment with plastic surgery. She has an appointment is scheduled for radiation starting next month. Otherwise, she denies any new complaints. Diagnosis  Invasive lobular carcinoma, left breast cancer 2002  Tubular carcinoma, right breast, 2006  Low grade  Infiltrating ductal carcinoma, right breast, March 2016  Grade 1  ER 99%, AZ 91%, HER-2 2+/equivocal, FISH-negative  Infiltrating ductal carcinoma, right breast, March 2018  Grade 1  ER 90.86%, AZ 89.29%, HER-2 1+/negative  Onctoype DX recurrence score: 4, 2018  Infiltrating ductal carcinoma, right breast, Feb 2021  Grade 1   ER 98%, %, HER-2 1+/negative. BRCA negative, 2018   (A) ER 50.95%,AZ 74.77%,HER2 1+ Negative; (B) ER 41.80,AZ 78.95 HER2 1+ Negative  Meghan 81 gene genetic panel: Negative    Treatment Summary  2002 Left lumpectomy  2002 MammoSite radiation therapy  2002 Tamoxifen, Arimidex tried, but unable to tolerate adjuvant estrogen blocking therapy  2007 - 2012 Faslodex x5 years  4/25/16 Bilateral skin sparing mastectomy with right negative SLNB  2016 Fareston, Aromasin  4/24/18 Right breast lumpectomy  May 2018 - Aug 2019 Arimidex  Aug 2019 - March 2020 Tamoxifen x3 months.  Discontinued due to side effects  Feb 2021 Letrozole-unable to take due to side effects  1/11/22 Arimidex 1mg daily  5/25/22 Faslodex every 4 weeks  10/19/22 Right mastectomy flap excision by Cong Rust at [facility]  10/19/22 Total capsulectomy by Jl Brown at [facility]   11/10/22 Anticipating radiation therapy     Cancer History  The patient has a very long history of left/right breast cancer over the last 2 decades. Further details as depicted below. 2002 Invasive lobular carcinoma, left breast cancer   2002 Tamoxifen, Arimidex tried, but unable to tolerate adjuvant estrogen blocking therapy  2002 MammoSite radiation therapy  2007 Tubular carcinoma, right breast, Low grade  2007 - 2012 Faslodex x5 years  5/24/13 CA 27-29 18.8  2/20/14 CA 27-29 16.8  3/2/15 CA 27-29 19.7  3/18/16 Right breast biopsies [facility]) : infiltrating low grade ductal carcinoma. ER 99%, WI 91%, HER-2 2+/equivocal, FISH-negative  4/25/16 Bilateral skin sparing mastectomy with right negative SLNB  4/25/16 Bilateral skin sparing mastectomy with right negative SLNB [facility]): Right breast mastectomy - infiltrating ductal carcinoma, low-grade (0.5 cm). No lymphovascular invasion noted. Focal calcifications identified. Tumor noted at the anterior margin. Left breast, mastectomy - unremarkable nipple and areola. Calcified cystic cavity noted. No evidence of malignancy identified. Margins are negative. Report Name: Breast Invasive Carcinoma. Right sentinel node biopsy - benign fat, no lymph node identified. 2016 Fareston, Aromasin  3/20/18  right breast [facility]): 5 mm lesion in the right breast. Ultrasound-guided breast biopsy is recommended and will be scheduled. 3/27/18 Right breast 3:00 lump [facility]): Infiltrating ductal carcinoma, grade 1/3. ER 90.86%, WI 89.29%, HER-2 1+/negative  4/6/18 PET scan [facility]): There is a focal area of increased uptake within the cecum which could be physiologic or could be related to malignancy. Follow-up colonoscopy is recommended. No other sites of disease identified. 4/7/18 MRI bilateral breast [facility]): No evidence of adenopathy. Findings in both breasts are felt to be benign. ACR BIRADS Category 2-benign. Note that this patient does have biopsy of a known malignancy in the right breast at 3:00. No malignant features are demonstrated by MRI, however. 4/24/18 Right breast lumpectomy [facility]): Two foci of infiltrating ductal carcinoma, low grade (6 mm and 3 mm). Surgical margins are negative. April 2018 Elkin Duck DX recurrence score: 4  May 2018 - Aug 2019 Arimidex  8/27/18 Bone density ([address] [address]): Normal. Lumbar spine T-score -0.6. Left hi T-score -0.8 falling in the range of normal.   4/10/19 CT abd/pelvis [facility]): No acute abnormality or obvious etiology for patient's symptoms seen. Aug 2019 - March 2020 Tamoxifen x3 months. Discontinued due to side effects  1/14/21 Right diagnostic mammogram [facility]): Ill-defined lesion in the 7 o'clock location of the postmastectomy right breast is localized to the skin. Given the patient's prior significant history of bilateral breast malignancy and recurrence to the postmastectomy right breast, findings are viewed with suspicion. As a result, surgical consultation is recommended for referral for evaluation and possible excision/skin punch biopsy of the lesion for further evaluation. 1/14/21 US right breast [facility]): Focused ultrasound of the postmastectomy right breast at 3 o'clock, 7 cm from the nipple demonstrates an ill-defined area of hypoechogenicity localized within the skin that measures up to 3 mm. There is no actual mass identified within   the postmastectomy breast tissues. 1/26/21 Right diagnostic mammogram [facility]): There are findings consistent with prior mastectomy with silicone implant reconstruction. A surgical clip is identified in the lower left axillary region. No dominant masses, suspicious calcifications, or areas of nonsurgical architectural distortion are seen.   Within the posterior outer left breast, subjacent to the skin marker, there is a questioned area of soft tissue density which may correspond with the questioned palpable finding. Further evaluation was performed with ultrasound. 1/26/21 US right breast [facility]): Targeted left breast sonography was performed. Left 3:00, 8 cm to 12 cm from the nipple, at site of the patient's palpable ability: There is an oval, parallel, hypoechoic/mixed echogenic mass measuring 3.3 cm. It demonstrates a primarily hypoechoic appearance with a questioned hyperechoic rim and a linear area of increased echogenicity within it. It possibly represents an area of fat necrosis or postsurgical change related to her prior tightening procedure, possibly related to sutures or mesh. Feb 2021 Letrozole-unable to take due to side effects  2/1/21 Breast, right, excisional biopsy [facility]): Infiltrating ductal carcinoma, grade 1 (of 3). Margins are negative for invasive malignancy. Breast, left, excisional biopsy: Fibrosis with foreign body giant cell reaction. Benign skeletal muscle. Negative for malignancy. COMMENT:  There is a focus of invasive ductal carcinoma measuring 0.3 cm located in the dermis and extending into the underlying subcutaneous tissue. There is no involvement of the epidermis. ER 98%, %, HER-2 1+/negative   5/25/21 CA 27-29 18.4  6/8/21 CT chest/abd/pelvis ([facility]): Redemonstrated prominence of the common bile duct and the intrahepatic biliary ducts, which was seen dating back to 2019. Patient is status post cholecystectomy. The liver otherwise shows no focal abnormality. Negative for evidence of metastatic disease elsewhere within the chest, abdomen, or pelvis. 11/4/21 CT chest/abd/pelvis [facility]): Stable exam without evidence for residual or recurrent disease. Postoperative changes in the stomach. Stable intrahepatic and extrahepatic biliary ductal dilatation. 11/21/21 Right breast, 6:00 lesion, excisional biopsy [facility]) : Invasive ductal carcinoma. Size of at least 0.5 cm. Margins appear uninvolved.  Right breast, 3:00 lesion, excision: Invasive ductal carcinoma. Size at least 0.5 cm. Margins appear uninvolved. ER 94.54%, OK 96.75%, HER-2 1+/negative  11/12/2021 laparoscopic right hemicolectomy: Showed polyps with multiple fragments of tubulovillous adenoma no high-grade dysplasia. No malignancy no lymph node involvement. 1/11/22 Initiated Arimidex 1mg daily  4/8/22 MRI left shoulder [facility]): Moderate supraspinatus and infraspinatus tendinosis with associated bursitis. Mild shoulder and AC joint osteoarthrosis. No evidence of metastatic disease to the shoulder. 5/10/22 US bilateral breast [facility]): No sonographic evidence of malignancy seen in either breast. Recommend high risk screening as per plan. 5/25/22 Initiated Faslodex every 4 weeks  6/9/22 CA 27-29 18.2  6/24/22 CT chest/abd/pelvis [facility]): No evidence of residual or recurrent disease. Persistent diffuse dilation of the common bile duct and intrahepatic biliary ducts. 7/13/2022 NM PET CT Skull Base to Mid Thigh([facility]) No evidence of disease. 7/13/2022 MRI Cholangiogram [facility]) Marked intra and extrahepatic biliary ductal dilation likely represents a normal post cholecystectomy appearance for this patient. No pancreatic head or periampullary mass or choledocholithiasis is seen. 7/25/2022-she is planning to have another surgery tomorrow. She is currently on Faslodex, off label due to poor tolerance on AI/tamoxifen in the past.  7/26/2022 [facility]) Right breast lesion at 1:00: Skin with attached fibrofatty tissue. No malignancy noted. Surgical margins are unremarkable. Right breast lesion at 3 o'clock: Infiltrating ductal carcinoma, grade 2/3. Lesion extends to surgical margin. ER:Positive (50.95%, Strong) OK:Positive (74.77%, Strong) HER2/ROBBIE TISSUE ASSAY-PARAFFIN RESULTS:1+ Negative. Right breast lesion at 5 o'clock: Infiltrating ductal carcinoma, grade 2/3. Lesion extends to surgical margin.  ER: Positive (41.80%, Strong) OK:Positive (78.95%, Strong) HER2/ROBBIE TISSUE ASSAY-PARAFFIN RESULTS:1+ Negative. B. Infiltrating carcinoma noted in section B2, size is 6.5 mm in greatest dimension. C. Infiltrating carcinoma noted in section C2, size is 5 mm in greatest dimension. 08/11/2022- the patient again had another excisional biopsy performed. Margins positive. Patient is current on Faslodex. She was seen by her plastic surgeon. They suggested a second opinion with a local oncologist for the case to be discussed at tumor board. I believe this is an excellent idea. I requested patient to get a second opinion/consultation. Meghan 81 gene genetic panel: Negative  8/25/2022 MRI Bilateral Breast W WO Contrast [facility]) Limited examination due to patient motion, but no MRI evidence of malignancy in the bilateral postmastectomy breasts. Development of noose/key hole sign involving the lateral aspect of the left-sided silicone capsule. This invagination of the silicone implant capsule is most consistent with a small intracapsular rupture. Right silicone implant appears intact. Appropriate management is recommended. 9/14/2022-I reviewed notes from medical oncology, radiation oncology and plastic surgery. The plan is to continue with Faslodex and proceed with surgery and adjuvant radiation. No need for Oncotype. The case was discussed at [facility]. 10/19/22 Right mastectomy by Dr. Darcie Barnes at [facility] : Residual two foci of invasive ductal carcinoma, grade 1 (Tubule 2, pleomorphism 2, mitosis 1), located in the dermis. The tumor measures 1.5 mm and 1.4 mm, respectively. No lymphovascular invasion identified. Surgical margins are negative for malignancy (far away from resection margin). Seborrheic keratosis. Right superior/anterior margin: Adipose tissue, no malignancy identified. Right inferior/anterior margin: Adipose tissue, no malignancy identified. Breast implant: Gross examination only.  Right breast implant capsule, excision: Fibrotic capsule and surrounding soft tissue, no malignancy identified. 11/10/22 Anticipating radiation therapy with  at [facility]        Past Medical History:    Past Medical History:   Diagnosis Date    Basal cell adenocarcinoma     Breast cancer [facility])        Past Surgical History:    Past Surgical History:   Procedure Laterality Date    BREAST LUMPECTOMY Right 04/24/2018    BREAST LUMPECTOMY Left 2002    COLON SURGERY         Social History:    Marital status:  Smoking status:No  ETOH status:Occasionally; 1 monthly  Resides: [place]    Family History:   Family History   Problem Relation Age of Onset    Breast Cancer Mother 68    Cancer Paternal Aunt 68        Ovarian       Current Hospital Medications:    Current Outpatient Medications   Medication Sig Dispense Refill    doxepin (SINEQUAN) 25 MG capsule 25 mg nightly      Calcium-Phosphorus-Vitamin D 250-107-500 MG-MG-UNIT CHEW Take 1 tablet by mouth every evening      vitamin D (CHOLECALCIFEROL) 25 MCG (1000 UT) TABS tablet Take 1,000 Units by mouth daily      Collagen-Vitamin C 1000-10 MG TABS Take 1 tablet by mouth daily      cyanocobalamin 1000 MCG tablet Take 1,000 mcg by mouth daily      Multiple Vitamins-Minerals (MULTIVITAMIN ADULTS 50+) TABS Take 1 tablet by mouth daily      ketoconazole (NIZORAL) 2 % shampoo APPLY AS SHAMPOO USE AS DIRECTED      methylPREDNISolone (MEDROL DOSEPACK) 4 MG tablet TAKE 6 TABLETS ON DAY 1 AS DIRECTED ON PACKAGE AND DECREASE BY 1 TAB EACH DAY FOR A TOTAL OF 6 DAYS      fluocinonide (LIDEX) 0.05 % external solution Apply 0.5 % topically as needed Apply topically 2 times daily. No current facility-administered medications for this visit. Facility-Administered Medications Ordered in Other Visits   Medication Dose Route Frequency Provider Last Rate Last Admin    fulvestrant (FASLODEX) IM injection 500 mg  500 mg IntraMUSCular Once Maren Reed MD           Allergies:    Allergies   Allergen Reactions    Penicillins Hives and Itching    Other Diarrhea and Nausea Only     Sweating      Bicitra         Subjective   REVIEW OF SYSTEMS:   CONSTITUTIONAL: no fever, no night sweats, no fatigue;  HEENT: no blurring of vision, no double vision, no hearing difficulty, no tinnitus, no ulceration, no dysplasia, no epistaxis;   LUNGS: no cough, no hemoptysis, no wheeze,  no shortness of breath;  CARDIOVASCULAR: no palpitation, no chest pain, no shortness of breath;  GI: no abdominal pain, no nausea, no vomiting, no diarrhea, no constipation;  FRANCINE: no dysuria, no hematuria, no frequency or urgency, no nephrolithiasis;  MUSCULOSKELETAL: no joint pain, no swelling, no stiffness;  ENDOCRINE: no polyuria, no polydipsia, no cold or heat intolerance;  HEMATOLOGY: no easy bruising or bleeding, no history of clotting disorder;  DERMATOLOGY: no skin rash, no eczema, no pruritus;  PSYCHIATRY: no depression, no anxiety, no panic attacks, no suicidal ideation, no homicidal ideation;  NEUROLOGY: no syncope, no seizures, no numbness or tingling of hands, no numbness or tingling of feet, no paresis;     Objective   /82 (Site: Left Upper Arm, Position: Sitting)   Pulse 79   Temp 98.4 °F (36.9 °C)   Ht 5' 3\" (1.6 m)   Wt 172 lb 12.8 oz (78.4 kg)   SpO2 98%   BMI 30.61 kg/m²     PHYSICAL EXAM:  CONSTITUTIONAL: Alert, appropriate, no acute distress  EYES: Non icteric, EOM intact, pupils equal round   ENT: Mucus membranes moist, no oral pharyngeal lesions, external inspection of ears and nose are normal.  NECK: Supple, no masses. No palpable thyroid mass  CHEST/LUNGS: CTA bilaterally, normal respiratory effort   CARDIOVASCULAR: RRR, no murmurs. No lower extremity edema  ABDOMEN: soft non-tender, active bowel sounds, no HSM. No palpable masses  EXTREMITIES: warm, full ROM in all 4 extremities, no focal weakness.   SKIN: warm, dry with no rashes or lesions  LYMPH: No cervical, clavicular, axillary, or inguinal lymphadenopathy  NEUROLOGIC: follows commands, non focal   PSYCH: mood and affect appropriate. Alert and oriented to time, place, person      LABORATORY RESULTS REVIEWED/ANALYZED BY ME:  Lab Results   Component Value Date    WBC 5.99 01/05/2023    HGB 12.4 01/05/2023    HCT 39.7 01/05/2023    .8 (H) 01/05/2023     01/05/2023     Lab Results   Component Value Date    NEUTROABS 3.54 01/05/2023       RADIOLOGY STUDIES REVIEWED BY ME:  None      ASSESSMENT:    No orders of the defined types were placed in this encounter. Joseph Porter was seen today for follow-up. Diagnoses and all orders for this visit:    Infiltrating ductal carcinoma ([facility] [address]    Local recurrence of cancer of right breast [facility])    Care plan discussed with patient    Use of fulvestrant (Faslodex)    Chemotherapy management, encounter for       Local recurrence/second primary IDC right breast ER/CT positive, HER2 negative  -Patient underwent excision of right breast lesion 7/26/2022.  -7/26/2022 [facility]) Right breast lesion at 1:00: Skin with attached fibrofatty tissue. No malignancy noted. Surgical margins are unremarkable. Right breast lesion at 3 o'clock: Infiltrating ductal carcinoma, grade 2/3. Lesion extends to surgical margin. ER:Positive (50.95%, Strong) CT:Positive (74.77%, Strong) HER2/ROBBIE TISSUE ASSAY-PARAFFIN RESULTS:1+ Negative. Right breast lesion at 5 o'clock: Infiltrating ductal carcinoma, grade 2/3. Lesion extends to surgical margin. ER: Positive (41.80%, Strong) CT:Positive (78.95%, Strong) HER2/ROBBIE TISSUE ASSAY-PARAFFIN RESULTS:1+ Negative.   -Unfortunately, no dimensions were provided. The lesion is grade 2/3. The lesion is ER/CT positive but less intense than the prior lesions.  -Continue Faslodex for the time being. The patient has tolerated prior therapies with tamoxifen or aromatase inhibitors very poorly. -She has started Faslodex about 4 months ago.   Therefore I cannot say that this is a failure of her Faslodex. In addition, she has no other reasonable option as endocrine therapy.  -I agree with seeing an oncologist locally to have case discussed at tumor board. I do not know if she will benefit from radiation or chemotherapy at this time. Case was discussed at [facility]. The patient was seen by medical oncology, radiation oncology and plastic surgery    The plan is to proceed with surgery, adjuvant radiation and continue Faslodex only. I disagree with adding CDK 4/6. There is no data to support this. 10/19/22 Right mastectomy by Dr. Rocky Dumont at [facility] : Residual two foci of invasive ductal carcinoma, grade 1 (Tubule 2, pleomorphism 2, mitosis 1), located in the dermis. The tumor measures 1.5 mm and 1.4 mm, respectively. No lymphovascular invasion identified. Surgical margins are negative for malignancy (far away from resection margin). Seborrheic keratosis. Right superior/anterior margin: Adipose tissue, no malignancy identified. Right inferior/anterior margin: Adipose tissue, no malignancy identified. Breast implant: Gross examination only. Right breast implant capsule, excision: Fibrotic capsule and surrounding soft tissue, no malignancy identified. Plan:  -Proceed with Faslodex today  -Proceed with adjuvant radiation starting next month. Genetic assessment she had a BRCA 1/2 test performed in the past.  This was negative. I have recommended her more comprehensive genetic test.  We will order genetic test during next visit. -8/17/22 Meghan 81 gene genetic panel: Negative      PLAN:  RTC with MD 4 months  Continue Faslodex every 4 weeks  Proceed with Dr. Mario Irwin with appointment with Dr Betsy Sauceda for radiation therapy after surgery-Radiation on HOLD for 90 days due to necessity of adequate expansion. -f/u 2/16/23      Lina AGUILERA Friday, am pre charting  as Medical Assistant for Wil Santillan MD. Electronically signed by Lisa Chadwick MA on 1/5/2023 at 1:23 PM CST. Easton Esparza am scribing for Firman Sever, MD. Electronically signed by Yessenia Soto RN on 1/5/2023 at 10:04 AM CST. I, Dr Inez Krishnamurthy, personally performed the services described in this documentation as scribed by Yessenia Soto RN in my presence and is both accurate and complete. I have seen, examined and reviewed this patient medication list, appropriate labs and imaging studies. I reviewed relevant medical records and others physicians notes. I discussed the plans of care with the patient. I answered all the questions to the patients satisfaction. I have also reviewed the chief complaint (CC) and part of the history (History of Present Illness (HPI), Past Family Social History [facility]), or Review of Systems (ROS) and made changes when appropriated. (Please note that portions of this note were completed with a voice recognition program. Efforts were made to edit the dictations but occasionally words are mis-transcribed. )Electronically signed by Firman Sever, MD on 1/5/2023 at 10:04 AM

## 2023-01-05 ENCOUNTER — HOSPITAL ENCOUNTER (OUTPATIENT)
Dept: INFUSION THERAPY | Age: 68
Discharge: HOME | End: 2023-01-05
Payer: MEDICARE

## 2023-01-05 ENCOUNTER — OFFICE VISIT (OUTPATIENT)
Dept: HEMATOLOGY | Age: 68
End: 2023-01-05
Payer: MEDICARE

## 2023-01-05 VITALS
OXYGEN SATURATION: 98 % | DIASTOLIC BLOOD PRESSURE: 82 MMHG | SYSTOLIC BLOOD PRESSURE: 120 MMHG | WEIGHT: 172.8 LBS | BODY MASS INDEX: 30.62 KG/M2 | TEMPERATURE: 98.4 F | HEART RATE: 79 BPM | HEIGHT: 63 IN

## 2023-01-05 DIAGNOSIS — R89.9 ABNORMAL LABORATORY TEST: Primary | ICD-10-CM

## 2023-01-05 DIAGNOSIS — Z71.89 CARE PLAN DISCUSSED WITH PATIENT: ICD-10-CM

## 2023-01-05 DIAGNOSIS — C50.911 LOCAL RECURRENCE OF CANCER OF RIGHT BREAST (HCC): ICD-10-CM

## 2023-01-05 DIAGNOSIS — C50.919 INFILTRATING DUCTAL CARCINOMA (HCC): Primary | ICD-10-CM

## 2023-01-05 DIAGNOSIS — Z51.11 CHEMOTHERAPY MANAGEMENT, ENCOUNTER FOR: ICD-10-CM

## 2023-01-05 DIAGNOSIS — C50.919 INFILTRATING DUCTAL CARCINOMA (HCC): ICD-10-CM

## 2023-01-05 DIAGNOSIS — Z79.818 USE OF FULVESTRANT (FASLODEX): ICD-10-CM

## 2023-01-05 LAB
BASOPHILS ABSOLUTE: 0.06 K/UL (ref 0.01–0.08)
BASOPHILS RELATIVE PERCENT: 1 % (ref 0.1–1.2)
EOSINOPHILS ABSOLUTE: 0.3 K/UL (ref 0.04–0.54)
EOSINOPHILS RELATIVE PERCENT: 5 % (ref 0.7–7)
HCT VFR BLD CALC: 39.7 % (ref 34.1–44.9)
HEMOGLOBIN: 12.4 G/DL (ref 11.2–15.7)
LYMPHOCYTES ABSOLUTE: 1.62 K/UL (ref 1.18–3.74)
LYMPHOCYTES RELATIVE PERCENT: 27 % (ref 19.3–53.1)
MCH RBC QN AUTO: 31.5 PG (ref 25.6–32.2)
MCHC RBC AUTO-ENTMCNC: 31.2 G/DL (ref 32.3–35.5)
MCV RBC AUTO: 100.8 FL (ref 79.4–94.8)
MONOCYTES ABSOLUTE: 0.45 K/UL (ref 0.24–0.82)
MONOCYTES RELATIVE PERCENT: 7.5 % (ref 4.7–12.5)
NEUTROPHILS ABSOLUTE: 3.54 K/UL (ref 1.56–6.13)
NEUTROPHILS RELATIVE PERCENT: 59.2 % (ref 34–71.1)
PDW BLD-RTO: 13.5 % (ref 11.7–14.4)
PLATELET # BLD: 261 K/UL (ref 182–369)
PMV BLD AUTO: 10.3 FL (ref 7.4–10.4)
RBC # BLD: 3.94 M/UL (ref 3.93–5.22)
WBC # BLD: 5.99 K/UL (ref 3.98–10.04)

## 2023-01-05 PROCEDURE — 3017F COLORECTAL CA SCREEN DOC REV: CPT | Performed by: INTERNAL MEDICINE

## 2023-01-05 PROCEDURE — 1123F ACP DISCUSS/DSCN MKR DOCD: CPT | Performed by: INTERNAL MEDICINE

## 2023-01-05 PROCEDURE — 6360000002 HC RX W HCPCS: Performed by: INTERNAL MEDICINE

## 2023-01-05 PROCEDURE — G8427 DOCREV CUR MEDS BY ELIG CLIN: HCPCS | Performed by: INTERNAL MEDICINE

## 2023-01-05 PROCEDURE — 96372 THER/PROPH/DIAG INJ SC/IM: CPT

## 2023-01-05 PROCEDURE — 1036F TOBACCO NON-USER: CPT | Performed by: INTERNAL MEDICINE

## 2023-01-05 PROCEDURE — 99212 OFFICE O/P EST SF 10 MIN: CPT

## 2023-01-05 PROCEDURE — 96402 CHEMO HORMON ANTINEOPL SQ/IM: CPT

## 2023-01-05 PROCEDURE — G8484 FLU IMMUNIZE NO ADMIN: HCPCS | Performed by: INTERNAL MEDICINE

## 2023-01-05 PROCEDURE — 1090F PRES/ABSN URINE INCON ASSESS: CPT | Performed by: INTERNAL MEDICINE

## 2023-01-05 PROCEDURE — G8417 CALC BMI ABV UP PARAM F/U: HCPCS | Performed by: INTERNAL MEDICINE

## 2023-01-05 PROCEDURE — 85025 COMPLETE CBC W/AUTO DIFF WBC: CPT

## 2023-01-05 PROCEDURE — G8400 PT W/DXA NO RESULTS DOC: HCPCS | Performed by: INTERNAL MEDICINE

## 2023-01-05 PROCEDURE — 36415 COLL VENOUS BLD VENIPUNCTURE: CPT

## 2023-01-05 PROCEDURE — 99213 OFFICE O/P EST LOW 20 MIN: CPT | Performed by: INTERNAL MEDICINE

## 2023-01-05 RX ORDER — LAMOTRIGINE 25 MG/1
500 TABLET ORAL ONCE
Status: COMPLETED | OUTPATIENT
Start: 2023-01-05 | End: 2023-01-05

## 2023-01-05 RX ADMIN — FULVESTRANT 500 MG: 50 INJECTION, SOLUTION INTRAMUSCULAR at 10:12

## 2023-01-19 PROBLEM — Z78.9 NON-SMOKER: Status: ACTIVE | Noted: 2023-01-19

## 2023-01-20 ENCOUNTER — OFFICE VISIT (OUTPATIENT)
Dept: RADIATION ONCOLOGY | Facility: HOSPITAL | Age: 68
End: 2023-01-20
Payer: MEDICARE

## 2023-01-20 ENCOUNTER — HOSPITAL ENCOUNTER (OUTPATIENT)
Dept: RADIATION ONCOLOGY | Facility: HOSPITAL | Age: 68
Setting detail: RADIATION/ONCOLOGY SERIES
Discharge: HOME | End: 2023-01-20
Payer: MEDICARE

## 2023-01-20 VITALS
DIASTOLIC BLOOD PRESSURE: 78 MMHG | HEART RATE: 96 BPM | BODY MASS INDEX: 31.01 KG/M2 | SYSTOLIC BLOOD PRESSURE: 138 MMHG | HEIGHT: 63 IN | OXYGEN SATURATION: 96 % | WEIGHT: 175 LBS

## 2023-01-20 DIAGNOSIS — Z90.13 S/P BILATERAL MASTECTOMY: ICD-10-CM

## 2023-01-20 DIAGNOSIS — Z98.890 S/P LYMPH NODE BIOPSY: ICD-10-CM

## 2023-01-20 DIAGNOSIS — C50.411 MALIGNANT NEOPLASM OF UPPER-OUTER QUADRANT OF RIGHT BREAST IN FEMALE, ESTROGEN RECEPTOR POSITIVE: Primary | ICD-10-CM

## 2023-01-20 DIAGNOSIS — Z17.0 MALIGNANT NEOPLASM OF UPPER-OUTER QUADRANT OF RIGHT BREAST IN FEMALE, ESTROGEN RECEPTOR POSITIVE: Primary | ICD-10-CM

## 2023-01-20 DIAGNOSIS — Z78.9 NON-SMOKER: ICD-10-CM

## 2023-01-20 PROCEDURE — G0463 HOSPITAL OUTPT CLINIC VISIT: HCPCS | Performed by: RADIOLOGY

## 2023-01-20 PROCEDURE — 77334 RADIATION TREATMENT AID(S): CPT | Performed by: RADIOLOGY

## 2023-01-20 PROCEDURE — 77290 THER RAD SIMULAJ FIELD CPLX: CPT | Performed by: RADIOLOGY

## 2023-01-23 PROCEDURE — 77300 RADIATION THERAPY DOSE PLAN: CPT | Performed by: RADIOLOGY

## 2023-01-23 PROCEDURE — 77295 3-D RADIOTHERAPY PLAN: CPT | Performed by: RADIOLOGY

## 2023-01-23 PROCEDURE — 77334 RADIATION TREATMENT AID(S): CPT | Performed by: RADIOLOGY

## 2023-01-25 ENCOUNTER — HOSPITAL ENCOUNTER (OUTPATIENT)
Dept: RADIATION ONCOLOGY | Facility: HOSPITAL | Age: 68
Setting detail: RADIATION/ONCOLOGY SERIES
Discharge: HOME | End: 2023-01-25
Payer: MEDICARE

## 2023-01-25 LAB
RAD ONC ARIA COURSE ID: NORMAL
RAD ONC ARIA COURSE LAST TREATMENT DATE: NORMAL
RAD ONC ARIA COURSE START DATE: NORMAL
RAD ONC ARIA COURSE TREATMENT ELAPSED DAYS: 0
RAD ONC ARIA FIRST TREATMENT DATE: NORMAL
RAD ONC ARIA PLAN FRACTIONS TREATED TO DATE: 1
RAD ONC ARIA PLAN ID: NORMAL
RAD ONC ARIA PLAN PRESCRIBED DOSE PER FRACTION: 2.66 GY
RAD ONC ARIA PLAN PRIMARY REFERENCE POINT: NORMAL
RAD ONC ARIA PLAN TOTAL FRACTIONS PRESCRIBED: 16
RAD ONC ARIA PLAN TOTAL PRESCRIBED DOSE: 4256 CGY
RAD ONC ARIA REFERENCE POINT DOSAGE GIVEN TO DATE: 2.66 GY
RAD ONC ARIA REFERENCE POINT ID: NORMAL
RAD ONC ARIA REFERENCE POINT SESSION DOSAGE GIVEN: 2.66 GY

## 2023-01-25 PROCEDURE — 77412 RADIATION TX DELIVERY LVL 3: CPT | Performed by: RADIOLOGY

## 2023-01-25 PROCEDURE — 77417 THER RADIOLOGY PORT IMAGE(S): CPT | Performed by: RADIOLOGY

## 2023-01-26 ENCOUNTER — HOSPITAL ENCOUNTER (OUTPATIENT)
Dept: RADIATION ONCOLOGY | Facility: HOSPITAL | Age: 68
Setting detail: RADIATION/ONCOLOGY SERIES
Discharge: HOME | End: 2023-01-26
Payer: MEDICARE

## 2023-01-26 ENCOUNTER — DOCUMENTATION (OUTPATIENT)
Dept: RADIATION ONCOLOGY | Facility: HOSPITAL | Age: 68
End: 2023-01-26
Payer: MEDICARE

## 2023-01-26 LAB
RAD ONC ARIA COURSE ID: NORMAL
RAD ONC ARIA COURSE LAST TREATMENT DATE: NORMAL
RAD ONC ARIA COURSE START DATE: NORMAL
RAD ONC ARIA COURSE TREATMENT ELAPSED DAYS: 1
RAD ONC ARIA FIRST TREATMENT DATE: NORMAL
RAD ONC ARIA PLAN FRACTIONS TREATED TO DATE: 2
RAD ONC ARIA PLAN ID: NORMAL
RAD ONC ARIA PLAN PRESCRIBED DOSE PER FRACTION: 2.66 GY
RAD ONC ARIA PLAN PRIMARY REFERENCE POINT: NORMAL
RAD ONC ARIA PLAN TOTAL FRACTIONS PRESCRIBED: 16
RAD ONC ARIA PLAN TOTAL PRESCRIBED DOSE: 4256 CGY
RAD ONC ARIA REFERENCE POINT DOSAGE GIVEN TO DATE: 5.32 GY
RAD ONC ARIA REFERENCE POINT ID: NORMAL
RAD ONC ARIA REFERENCE POINT SESSION DOSAGE GIVEN: 2.66 GY

## 2023-01-26 PROCEDURE — 77412 RADIATION TX DELIVERY LVL 3: CPT | Performed by: RADIOLOGY

## 2023-01-27 ENCOUNTER — HOSPITAL ENCOUNTER (OUTPATIENT)
Dept: RADIATION ONCOLOGY | Facility: HOSPITAL | Age: 68
Setting detail: RADIATION/ONCOLOGY SERIES
Discharge: HOME | End: 2023-01-27
Payer: MEDICARE

## 2023-01-27 LAB
RAD ONC ARIA COURSE ID: NORMAL
RAD ONC ARIA COURSE LAST TREATMENT DATE: NORMAL
RAD ONC ARIA COURSE START DATE: NORMAL
RAD ONC ARIA COURSE TREATMENT ELAPSED DAYS: 2
RAD ONC ARIA FIRST TREATMENT DATE: NORMAL
RAD ONC ARIA PLAN FRACTIONS TREATED TO DATE: 3
RAD ONC ARIA PLAN ID: NORMAL
RAD ONC ARIA PLAN PRESCRIBED DOSE PER FRACTION: 2.66 GY
RAD ONC ARIA PLAN PRIMARY REFERENCE POINT: NORMAL
RAD ONC ARIA PLAN TOTAL FRACTIONS PRESCRIBED: 16
RAD ONC ARIA PLAN TOTAL PRESCRIBED DOSE: 4256 CGY
RAD ONC ARIA REFERENCE POINT DOSAGE GIVEN TO DATE: 7.98 GY
RAD ONC ARIA REFERENCE POINT ID: NORMAL
RAD ONC ARIA REFERENCE POINT SESSION DOSAGE GIVEN: 2.66 GY

## 2023-01-27 PROCEDURE — 77336 RADIATION PHYSICS CONSULT: CPT | Performed by: RADIOLOGY

## 2023-01-27 PROCEDURE — 77412 RADIATION TX DELIVERY LVL 3: CPT | Performed by: RADIOLOGY

## 2023-01-30 ENCOUNTER — HOSPITAL ENCOUNTER (OUTPATIENT)
Dept: RADIATION ONCOLOGY | Facility: HOSPITAL | Age: 68
Setting detail: RADIATION/ONCOLOGY SERIES
Discharge: HOME | End: 2023-01-30
Payer: MEDICARE

## 2023-01-30 LAB
RAD ONC ARIA COURSE ID: NORMAL
RAD ONC ARIA COURSE LAST TREATMENT DATE: NORMAL
RAD ONC ARIA COURSE START DATE: NORMAL
RAD ONC ARIA COURSE TREATMENT ELAPSED DAYS: 5
RAD ONC ARIA FIRST TREATMENT DATE: NORMAL
RAD ONC ARIA PLAN FRACTIONS TREATED TO DATE: 4
RAD ONC ARIA PLAN ID: NORMAL
RAD ONC ARIA PLAN PRESCRIBED DOSE PER FRACTION: 2.66 GY
RAD ONC ARIA PLAN PRIMARY REFERENCE POINT: NORMAL
RAD ONC ARIA PLAN TOTAL FRACTIONS PRESCRIBED: 16
RAD ONC ARIA PLAN TOTAL PRESCRIBED DOSE: 4256 CGY
RAD ONC ARIA REFERENCE POINT DOSAGE GIVEN TO DATE: 10.64 GY
RAD ONC ARIA REFERENCE POINT ID: NORMAL
RAD ONC ARIA REFERENCE POINT SESSION DOSAGE GIVEN: 2.66 GY

## 2023-01-30 PROCEDURE — 77412 RADIATION TX DELIVERY LVL 3: CPT | Performed by: RADIOLOGY

## 2023-02-01 ENCOUNTER — HOSPITAL ENCOUNTER (OUTPATIENT)
Dept: RADIATION ONCOLOGY | Facility: HOSPITAL | Age: 68
Setting detail: RADIATION/ONCOLOGY SERIES
End: 2023-02-01
Payer: MEDICARE

## 2023-02-01 ENCOUNTER — HOSPITAL ENCOUNTER (OUTPATIENT)
Dept: RADIATION ONCOLOGY | Facility: HOSPITAL | Age: 68
Setting detail: RADIATION/ONCOLOGY SERIES
Discharge: HOME | End: 2023-02-01
Payer: MEDICARE

## 2023-02-01 LAB
RAD ONC ARIA COURSE ID: NORMAL
RAD ONC ARIA COURSE LAST TREATMENT DATE: NORMAL
RAD ONC ARIA COURSE START DATE: NORMAL
RAD ONC ARIA COURSE TREATMENT ELAPSED DAYS: 7
RAD ONC ARIA FIRST TREATMENT DATE: NORMAL
RAD ONC ARIA PLAN FRACTIONS TREATED TO DATE: 5
RAD ONC ARIA PLAN ID: NORMAL
RAD ONC ARIA PLAN PRESCRIBED DOSE PER FRACTION: 2.66 GY
RAD ONC ARIA PLAN PRIMARY REFERENCE POINT: NORMAL
RAD ONC ARIA PLAN TOTAL FRACTIONS PRESCRIBED: 16
RAD ONC ARIA PLAN TOTAL PRESCRIBED DOSE: 4256 CGY
RAD ONC ARIA REFERENCE POINT DOSAGE GIVEN TO DATE: 13.3 GY
RAD ONC ARIA REFERENCE POINT ID: NORMAL
RAD ONC ARIA REFERENCE POINT SESSION DOSAGE GIVEN: 2.66 GY

## 2023-02-01 PROCEDURE — 77412 RADIATION TX DELIVERY LVL 3: CPT | Performed by: RADIOLOGY

## 2023-02-02 ENCOUNTER — HOSPITAL ENCOUNTER (OUTPATIENT)
Dept: RADIATION ONCOLOGY | Facility: HOSPITAL | Age: 68
Setting detail: RADIATION/ONCOLOGY SERIES
Discharge: HOME | End: 2023-02-02
Payer: MEDICARE

## 2023-02-02 ENCOUNTER — HOSPITAL ENCOUNTER (OUTPATIENT)
Dept: INFUSION THERAPY | Age: 68
Discharge: HOME | End: 2023-02-02
Payer: MEDICARE

## 2023-02-02 VITALS
OXYGEN SATURATION: 98 % | HEART RATE: 92 BPM | BODY MASS INDEX: 30.95 KG/M2 | DIASTOLIC BLOOD PRESSURE: 70 MMHG | WEIGHT: 174.7 LBS | SYSTOLIC BLOOD PRESSURE: 130 MMHG

## 2023-02-02 DIAGNOSIS — R89.9 ABNORMAL LABORATORY TEST: Primary | ICD-10-CM

## 2023-02-02 DIAGNOSIS — C50.919 INFILTRATING DUCTAL CARCINOMA (HCC): ICD-10-CM

## 2023-02-02 LAB
HCT VFR BLD CALC: 37.2 % (ref 34.1–44.9)
HEMOGLOBIN: 12 G/DL (ref 11.2–15.7)
LYMPHOCYTES ABSOLUTE: 1.5 K/UL (ref 1.18–3.74)
LYMPHOCYTES RELATIVE PERCENT: 31.9 % (ref 19.3–53.1)
MCH RBC QN AUTO: 32.2 PG (ref 25.6–32.2)
MCHC RBC AUTO-ENTMCNC: 32.3 G/DL (ref 32.3–35.5)
MCV RBC AUTO: 99.7 FL (ref 79.4–94.8)
MONOCYTES ABSOLUTE: 0.4 K/UL (ref 0.24–0.82)
MONOCYTES RELATIVE PERCENT: 8 % (ref 4.7–12.5)
NEUTROPHILS ABSOLUTE: 2.8 K/UL (ref 1.56–6.13)
NEUTROPHILS RELATIVE PERCENT: 60.1 % (ref 34–71.1)
PDW BLD-RTO: 13.6 % (ref 11.7–14.4)
PLATELET # BLD: 324 K/UL (ref 182–369)
PMV BLD AUTO: 9.1 FL (ref 7.4–10.4)
RAD ONC ARIA COURSE ID: NORMAL
RAD ONC ARIA COURSE LAST TREATMENT DATE: NORMAL
RAD ONC ARIA COURSE START DATE: NORMAL
RAD ONC ARIA COURSE TREATMENT ELAPSED DAYS: 8
RAD ONC ARIA FIRST TREATMENT DATE: NORMAL
RAD ONC ARIA PLAN FRACTIONS TREATED TO DATE: 6
RAD ONC ARIA PLAN ID: NORMAL
RAD ONC ARIA PLAN PRESCRIBED DOSE PER FRACTION: 2.66 GY
RAD ONC ARIA PLAN PRIMARY REFERENCE POINT: NORMAL
RAD ONC ARIA PLAN TOTAL FRACTIONS PRESCRIBED: 16
RAD ONC ARIA PLAN TOTAL PRESCRIBED DOSE: 4256 CGY
RAD ONC ARIA REFERENCE POINT DOSAGE GIVEN TO DATE: 15.96 GY
RAD ONC ARIA REFERENCE POINT ID: NORMAL
RAD ONC ARIA REFERENCE POINT SESSION DOSAGE GIVEN: 2.66 GY
RBC # BLD: 3.73 M/UL (ref 3.93–5.22)
WBC # BLD: 4.7 K/UL (ref 3.98–10.04)

## 2023-02-02 PROCEDURE — 85025 COMPLETE CBC W/AUTO DIFF WBC: CPT

## 2023-02-02 PROCEDURE — 6360000002 HC RX W HCPCS: Performed by: INTERNAL MEDICINE

## 2023-02-02 PROCEDURE — 36415 COLL VENOUS BLD VENIPUNCTURE: CPT

## 2023-02-02 PROCEDURE — 77412 RADIATION TX DELIVERY LVL 3: CPT | Performed by: RADIOLOGY

## 2023-02-02 PROCEDURE — 96402 CHEMO HORMON ANTINEOPL SQ/IM: CPT

## 2023-02-02 RX ORDER — LAMOTRIGINE 25 MG/1
500 TABLET ORAL ONCE
Status: COMPLETED | OUTPATIENT
Start: 2023-02-02 | End: 2023-02-02

## 2023-02-02 RX ADMIN — FULVESTRANT 500 MG: 50 INJECTION, SOLUTION INTRAMUSCULAR at 10:06

## 2023-02-03 ENCOUNTER — HOSPITAL ENCOUNTER (OUTPATIENT)
Dept: RADIATION ONCOLOGY | Facility: HOSPITAL | Age: 68
Setting detail: RADIATION/ONCOLOGY SERIES
Discharge: HOME | End: 2023-02-03
Payer: MEDICARE

## 2023-02-03 LAB
RAD ONC ARIA COURSE ID: NORMAL
RAD ONC ARIA COURSE LAST TREATMENT DATE: NORMAL
RAD ONC ARIA COURSE START DATE: NORMAL
RAD ONC ARIA COURSE TREATMENT ELAPSED DAYS: 9
RAD ONC ARIA FIRST TREATMENT DATE: NORMAL
RAD ONC ARIA PLAN FRACTIONS TREATED TO DATE: 7
RAD ONC ARIA PLAN ID: NORMAL
RAD ONC ARIA PLAN PRESCRIBED DOSE PER FRACTION: 2.66 GY
RAD ONC ARIA PLAN PRIMARY REFERENCE POINT: NORMAL
RAD ONC ARIA PLAN TOTAL FRACTIONS PRESCRIBED: 16
RAD ONC ARIA PLAN TOTAL PRESCRIBED DOSE: 4256 CGY
RAD ONC ARIA REFERENCE POINT DOSAGE GIVEN TO DATE: 18.62 GY
RAD ONC ARIA REFERENCE POINT ID: NORMAL
RAD ONC ARIA REFERENCE POINT SESSION DOSAGE GIVEN: 2.66 GY

## 2023-02-03 PROCEDURE — 77412 RADIATION TX DELIVERY LVL 3: CPT | Performed by: RADIOLOGY

## 2023-02-06 ENCOUNTER — HOSPITAL ENCOUNTER (OUTPATIENT)
Dept: RADIATION ONCOLOGY | Facility: HOSPITAL | Age: 68
Discharge: HOME | End: 2023-02-06

## 2023-02-06 LAB
RAD ONC ARIA COURSE ID: NORMAL
RAD ONC ARIA COURSE LAST TREATMENT DATE: NORMAL
RAD ONC ARIA COURSE START DATE: NORMAL
RAD ONC ARIA COURSE TREATMENT ELAPSED DAYS: 12
RAD ONC ARIA FIRST TREATMENT DATE: NORMAL
RAD ONC ARIA PLAN FRACTIONS TREATED TO DATE: 8
RAD ONC ARIA PLAN ID: NORMAL
RAD ONC ARIA PLAN PRESCRIBED DOSE PER FRACTION: 2.66 GY
RAD ONC ARIA PLAN PRIMARY REFERENCE POINT: NORMAL
RAD ONC ARIA PLAN TOTAL FRACTIONS PRESCRIBED: 16
RAD ONC ARIA PLAN TOTAL PRESCRIBED DOSE: 4256 CGY
RAD ONC ARIA REFERENCE POINT DOSAGE GIVEN TO DATE: 21.28 GY
RAD ONC ARIA REFERENCE POINT ID: NORMAL
RAD ONC ARIA REFERENCE POINT SESSION DOSAGE GIVEN: 2.66 GY

## 2023-02-06 PROCEDURE — 77412 RADIATION TX DELIVERY LVL 3: CPT | Performed by: RADIOLOGY

## 2023-02-07 ENCOUNTER — HOSPITAL ENCOUNTER (OUTPATIENT)
Dept: RADIATION ONCOLOGY | Facility: HOSPITAL | Age: 68
Setting detail: RADIATION/ONCOLOGY SERIES
Discharge: HOME | End: 2023-02-07
Payer: MEDICARE

## 2023-02-07 LAB
RAD ONC ARIA COURSE ID: NORMAL
RAD ONC ARIA COURSE LAST TREATMENT DATE: NORMAL
RAD ONC ARIA COURSE START DATE: NORMAL
RAD ONC ARIA COURSE TREATMENT ELAPSED DAYS: 13
RAD ONC ARIA FIRST TREATMENT DATE: NORMAL
RAD ONC ARIA PLAN FRACTIONS TREATED TO DATE: 9
RAD ONC ARIA PLAN ID: NORMAL
RAD ONC ARIA PLAN PRESCRIBED DOSE PER FRACTION: 2.66 GY
RAD ONC ARIA PLAN PRIMARY REFERENCE POINT: NORMAL
RAD ONC ARIA PLAN TOTAL FRACTIONS PRESCRIBED: 16
RAD ONC ARIA PLAN TOTAL PRESCRIBED DOSE: 4256 CGY
RAD ONC ARIA REFERENCE POINT DOSAGE GIVEN TO DATE: 23.94 GY
RAD ONC ARIA REFERENCE POINT ID: NORMAL
RAD ONC ARIA REFERENCE POINT SESSION DOSAGE GIVEN: 0.64 GY

## 2023-02-07 PROCEDURE — 77412 RADIATION TX DELIVERY LVL 3: CPT | Performed by: RADIOLOGY

## 2023-02-08 ENCOUNTER — HOSPITAL ENCOUNTER (OUTPATIENT)
Dept: RADIATION ONCOLOGY | Facility: HOSPITAL | Age: 68
Setting detail: RADIATION/ONCOLOGY SERIES
Discharge: HOME | End: 2023-02-08
Payer: MEDICARE

## 2023-02-08 LAB
RAD ONC ARIA COURSE ID: NORMAL
RAD ONC ARIA COURSE LAST TREATMENT DATE: NORMAL
RAD ONC ARIA COURSE START DATE: NORMAL
RAD ONC ARIA COURSE TREATMENT ELAPSED DAYS: 14
RAD ONC ARIA FIRST TREATMENT DATE: NORMAL
RAD ONC ARIA PLAN FRACTIONS TREATED TO DATE: 10
RAD ONC ARIA PLAN ID: NORMAL
RAD ONC ARIA PLAN PRESCRIBED DOSE PER FRACTION: 2.66 GY
RAD ONC ARIA PLAN PRIMARY REFERENCE POINT: NORMAL
RAD ONC ARIA PLAN TOTAL FRACTIONS PRESCRIBED: 16
RAD ONC ARIA PLAN TOTAL PRESCRIBED DOSE: 4256 CGY
RAD ONC ARIA REFERENCE POINT DOSAGE GIVEN TO DATE: 26.6 GY
RAD ONC ARIA REFERENCE POINT ID: NORMAL
RAD ONC ARIA REFERENCE POINT SESSION DOSAGE GIVEN: 2.66 GY

## 2023-02-08 PROCEDURE — 77336 RADIATION PHYSICS CONSULT: CPT | Performed by: RADIOLOGY

## 2023-02-08 PROCEDURE — 77412 RADIATION TX DELIVERY LVL 3: CPT | Performed by: RADIOLOGY

## 2023-02-09 ENCOUNTER — HOSPITAL ENCOUNTER (OUTPATIENT)
Dept: RADIATION ONCOLOGY | Facility: HOSPITAL | Age: 68
Setting detail: RADIATION/ONCOLOGY SERIES
Discharge: HOME | End: 2023-02-09
Payer: MEDICARE

## 2023-02-09 LAB
RAD ONC ARIA COURSE ID: NORMAL
RAD ONC ARIA COURSE LAST TREATMENT DATE: NORMAL
RAD ONC ARIA COURSE START DATE: NORMAL
RAD ONC ARIA COURSE TREATMENT ELAPSED DAYS: 15
RAD ONC ARIA FIRST TREATMENT DATE: NORMAL
RAD ONC ARIA PLAN FRACTIONS TREATED TO DATE: 11
RAD ONC ARIA PLAN ID: NORMAL
RAD ONC ARIA PLAN PRESCRIBED DOSE PER FRACTION: 2.66 GY
RAD ONC ARIA PLAN PRIMARY REFERENCE POINT: NORMAL
RAD ONC ARIA PLAN TOTAL FRACTIONS PRESCRIBED: 16
RAD ONC ARIA PLAN TOTAL PRESCRIBED DOSE: 4256 CGY
RAD ONC ARIA REFERENCE POINT DOSAGE GIVEN TO DATE: 29.26 GY
RAD ONC ARIA REFERENCE POINT ID: NORMAL
RAD ONC ARIA REFERENCE POINT SESSION DOSAGE GIVEN: 2.66 GY

## 2023-02-09 PROCEDURE — 77417 THER RADIOLOGY PORT IMAGE(S): CPT | Performed by: RADIOLOGY

## 2023-02-09 PROCEDURE — 77412 RADIATION TX DELIVERY LVL 3: CPT | Performed by: RADIOLOGY

## 2023-02-10 ENCOUNTER — HOSPITAL ENCOUNTER (OUTPATIENT)
Dept: RADIATION ONCOLOGY | Facility: HOSPITAL | Age: 68
Setting detail: RADIATION/ONCOLOGY SERIES
Discharge: HOME | End: 2023-02-10
Payer: MEDICARE

## 2023-02-10 LAB
RAD ONC ARIA COURSE ID: NORMAL
RAD ONC ARIA COURSE LAST TREATMENT DATE: NORMAL
RAD ONC ARIA COURSE START DATE: NORMAL
RAD ONC ARIA COURSE TREATMENT ELAPSED DAYS: 16
RAD ONC ARIA FIRST TREATMENT DATE: NORMAL
RAD ONC ARIA PLAN FRACTIONS TREATED TO DATE: 12
RAD ONC ARIA PLAN ID: NORMAL
RAD ONC ARIA PLAN PRESCRIBED DOSE PER FRACTION: 2.66 GY
RAD ONC ARIA PLAN PRIMARY REFERENCE POINT: NORMAL
RAD ONC ARIA PLAN TOTAL FRACTIONS PRESCRIBED: 16
RAD ONC ARIA PLAN TOTAL PRESCRIBED DOSE: 4256 CGY
RAD ONC ARIA REFERENCE POINT DOSAGE GIVEN TO DATE: 31.92 GY
RAD ONC ARIA REFERENCE POINT ID: NORMAL
RAD ONC ARIA REFERENCE POINT SESSION DOSAGE GIVEN: 2.66 GY

## 2023-02-10 PROCEDURE — 77412 RADIATION TX DELIVERY LVL 3: CPT | Performed by: RADIOLOGY

## 2023-02-13 ENCOUNTER — HOSPITAL ENCOUNTER (OUTPATIENT)
Dept: RADIATION ONCOLOGY | Facility: HOSPITAL | Age: 68
Setting detail: RADIATION/ONCOLOGY SERIES
Discharge: HOME | End: 2023-02-13
Payer: MEDICARE

## 2023-02-13 LAB
RAD ONC ARIA COURSE ID: NORMAL
RAD ONC ARIA COURSE LAST TREATMENT DATE: NORMAL
RAD ONC ARIA COURSE START DATE: NORMAL
RAD ONC ARIA COURSE TREATMENT ELAPSED DAYS: 19
RAD ONC ARIA FIRST TREATMENT DATE: NORMAL
RAD ONC ARIA PLAN FRACTIONS TREATED TO DATE: 13
RAD ONC ARIA PLAN ID: NORMAL
RAD ONC ARIA PLAN PRESCRIBED DOSE PER FRACTION: 2.66 GY
RAD ONC ARIA PLAN PRIMARY REFERENCE POINT: NORMAL
RAD ONC ARIA PLAN TOTAL FRACTIONS PRESCRIBED: 16
RAD ONC ARIA PLAN TOTAL PRESCRIBED DOSE: 4256 CGY
RAD ONC ARIA REFERENCE POINT DOSAGE GIVEN TO DATE: 34.58 GY
RAD ONC ARIA REFERENCE POINT ID: NORMAL
RAD ONC ARIA REFERENCE POINT SESSION DOSAGE GIVEN: 2.66 GY

## 2023-02-13 PROCEDURE — 77412 RADIATION TX DELIVERY LVL 3: CPT | Performed by: RADIOLOGY

## 2023-02-14 ENCOUNTER — HOSPITAL ENCOUNTER (OUTPATIENT)
Dept: RADIATION ONCOLOGY | Facility: HOSPITAL | Age: 68
Setting detail: RADIATION/ONCOLOGY SERIES
Discharge: HOME | End: 2023-02-14
Payer: MEDICARE

## 2023-02-14 LAB
RAD ONC ARIA COURSE ID: NORMAL
RAD ONC ARIA COURSE LAST TREATMENT DATE: NORMAL
RAD ONC ARIA COURSE START DATE: NORMAL
RAD ONC ARIA COURSE TREATMENT ELAPSED DAYS: 20
RAD ONC ARIA FIRST TREATMENT DATE: NORMAL
RAD ONC ARIA PLAN FRACTIONS TREATED TO DATE: 14
RAD ONC ARIA PLAN ID: NORMAL
RAD ONC ARIA PLAN PRESCRIBED DOSE PER FRACTION: 2.66 GY
RAD ONC ARIA PLAN PRIMARY REFERENCE POINT: NORMAL
RAD ONC ARIA PLAN TOTAL FRACTIONS PRESCRIBED: 16
RAD ONC ARIA PLAN TOTAL PRESCRIBED DOSE: 4256 CGY
RAD ONC ARIA REFERENCE POINT DOSAGE GIVEN TO DATE: 37.24 GY
RAD ONC ARIA REFERENCE POINT ID: NORMAL
RAD ONC ARIA REFERENCE POINT SESSION DOSAGE GIVEN: 2.66 GY

## 2023-02-14 PROCEDURE — 77412 RADIATION TX DELIVERY LVL 3: CPT | Performed by: RADIOLOGY

## 2023-02-15 ENCOUNTER — HOSPITAL ENCOUNTER (OUTPATIENT)
Dept: RADIATION ONCOLOGY | Facility: HOSPITAL | Age: 68
Setting detail: RADIATION/ONCOLOGY SERIES
Discharge: HOME | End: 2023-02-15
Payer: MEDICARE

## 2023-02-15 LAB
RAD ONC ARIA COURSE ID: NORMAL
RAD ONC ARIA COURSE LAST TREATMENT DATE: NORMAL
RAD ONC ARIA COURSE START DATE: NORMAL
RAD ONC ARIA COURSE TREATMENT ELAPSED DAYS: 21
RAD ONC ARIA FIRST TREATMENT DATE: NORMAL
RAD ONC ARIA PLAN FRACTIONS TREATED TO DATE: 15
RAD ONC ARIA PLAN ID: NORMAL
RAD ONC ARIA PLAN PRESCRIBED DOSE PER FRACTION: 2.66 GY
RAD ONC ARIA PLAN PRIMARY REFERENCE POINT: NORMAL
RAD ONC ARIA PLAN TOTAL FRACTIONS PRESCRIBED: 16
RAD ONC ARIA PLAN TOTAL PRESCRIBED DOSE: 4256 CGY
RAD ONC ARIA REFERENCE POINT DOSAGE GIVEN TO DATE: 39.9 GY
RAD ONC ARIA REFERENCE POINT ID: NORMAL
RAD ONC ARIA REFERENCE POINT SESSION DOSAGE GIVEN: 2.66 GY

## 2023-02-15 PROCEDURE — 77412 RADIATION TX DELIVERY LVL 3: CPT | Performed by: RADIOLOGY

## 2023-02-16 ENCOUNTER — HOSPITAL ENCOUNTER (OUTPATIENT)
Dept: RADIATION ONCOLOGY | Facility: HOSPITAL | Age: 68
Setting detail: RADIATION/ONCOLOGY SERIES
Discharge: HOME | End: 2023-02-16
Payer: MEDICARE

## 2023-02-16 LAB
RAD ONC ARIA COURSE ID: NORMAL
RAD ONC ARIA COURSE LAST TREATMENT DATE: NORMAL
RAD ONC ARIA COURSE START DATE: NORMAL
RAD ONC ARIA COURSE TREATMENT ELAPSED DAYS: 22
RAD ONC ARIA FIRST TREATMENT DATE: NORMAL
RAD ONC ARIA PLAN FRACTIONS TREATED TO DATE: 16
RAD ONC ARIA PLAN ID: NORMAL
RAD ONC ARIA PLAN PRESCRIBED DOSE PER FRACTION: 2.66 GY
RAD ONC ARIA PLAN PRIMARY REFERENCE POINT: NORMAL
RAD ONC ARIA PLAN TOTAL FRACTIONS PRESCRIBED: 16
RAD ONC ARIA PLAN TOTAL PRESCRIBED DOSE: 4256 CGY
RAD ONC ARIA REFERENCE POINT DOSAGE GIVEN TO DATE: 42.56 GY
RAD ONC ARIA REFERENCE POINT ID: NORMAL
RAD ONC ARIA REFERENCE POINT SESSION DOSAGE GIVEN: 2.66 GY

## 2023-02-16 PROCEDURE — 77336 RADIATION PHYSICS CONSULT: CPT | Performed by: RADIOLOGY

## 2023-02-16 PROCEDURE — 77412 RADIATION TX DELIVERY LVL 3: CPT | Performed by: RADIOLOGY

## 2023-03-02 ENCOUNTER — HOSPITAL ENCOUNTER (OUTPATIENT)
Dept: INFUSION THERAPY | Age: 68
Discharge: HOME | End: 2023-03-02
Payer: MEDICARE

## 2023-03-02 VITALS
OXYGEN SATURATION: 99 % | SYSTOLIC BLOOD PRESSURE: 146 MMHG | DIASTOLIC BLOOD PRESSURE: 72 MMHG | HEIGHT: 63 IN | HEART RATE: 86 BPM | WEIGHT: 177.7 LBS | BODY MASS INDEX: 31.48 KG/M2

## 2023-03-02 DIAGNOSIS — R89.9 ABNORMAL LABORATORY TEST: Primary | ICD-10-CM

## 2023-03-02 DIAGNOSIS — C50.919 INFILTRATING DUCTAL CARCINOMA (HCC): ICD-10-CM

## 2023-03-02 LAB
ALBUMIN SERPL-MCNC: 3.6 G/DL (ref 3.5–5.2)
ALP BLD-CCNC: 103 U/L (ref 35–104)
ALT SERPL-CCNC: 25 U/L (ref 9–52)
ANION GAP SERPL CALCULATED.3IONS-SCNC: 4 MMOL/L (ref 7–19)
AST SERPL-CCNC: 41 U/L (ref 14–36)
BILIRUB SERPL-MCNC: 0.4 MG/DL (ref 0.2–1.3)
BUN BLDV-MCNC: 13 MG/DL (ref 7–17)
CALCIUM SERPL-MCNC: 9.1 MG/DL (ref 8.4–10.2)
CHLORIDE BLD-SCNC: 106 MMOL/L (ref 98–111)
CO2: 32 MMOL/L (ref 22–29)
CREAT SERPL-MCNC: 0.7 MG/DL (ref 0.5–1)
GFR SERPL CREATININE-BSD FRML MDRD: >60 ML/MIN/{1.73_M2}
GLUCOSE BLD-MCNC: 113 MG/DL (ref 74–106)
HCT VFR BLD CALC: 36.4 % (ref 34.1–44.9)
HEMOGLOBIN: 11.6 G/DL (ref 11.2–15.7)
LYMPHOCYTES ABSOLUTE: 1.02 K/UL (ref 1.18–3.74)
LYMPHOCYTES RELATIVE PERCENT: 26.5 % (ref 19.3–53.1)
MCH RBC QN AUTO: 31.5 PG (ref 25.6–32.2)
MCHC RBC AUTO-ENTMCNC: 31.9 G/DL (ref 32.3–35.5)
MCV RBC AUTO: 98.9 FL (ref 79.4–94.8)
MONOCYTES ABSOLUTE: 0.29 K/UL (ref 0.24–0.82)
MONOCYTES RELATIVE PERCENT: 7.5 % (ref 4.7–12.5)
NEUTROPHILS ABSOLUTE: 2.32 K/UL (ref 1.56–6.13)
NEUTROPHILS RELATIVE PERCENT: 60.2 % (ref 34–71.1)
PDW BLD-RTO: 13.3 % (ref 11.7–14.4)
PLATELET # BLD: 273 K/UL (ref 182–369)
PMV BLD AUTO: 9 FL (ref 7.4–10.4)
POTASSIUM SERPL-SCNC: 3.9 MMOL/L (ref 3.5–5.1)
RBC # BLD: 3.68 M/UL (ref 3.93–5.22)
SODIUM BLD-SCNC: 142 MMOL/L (ref 137–145)
TOTAL PROTEIN: 6.1 G/DL (ref 6.3–8.2)
WBC # BLD: 3.85 K/UL (ref 3.98–10.04)

## 2023-03-02 PROCEDURE — 36415 COLL VENOUS BLD VENIPUNCTURE: CPT

## 2023-03-02 PROCEDURE — 85025 COMPLETE CBC W/AUTO DIFF WBC: CPT

## 2023-03-02 PROCEDURE — 6360000002 HC RX W HCPCS: Performed by: INTERNAL MEDICINE

## 2023-03-02 PROCEDURE — 80053 COMPREHEN METABOLIC PANEL: CPT

## 2023-03-02 PROCEDURE — 96402 CHEMO HORMON ANTINEOPL SQ/IM: CPT

## 2023-03-02 RX ORDER — LAMOTRIGINE 25 MG/1
500 TABLET ORAL ONCE
Status: COMPLETED | OUTPATIENT
Start: 2023-03-02 | End: 2023-03-02

## 2023-03-02 RX ADMIN — FULVESTRANT 500 MG: 50 INJECTION, SOLUTION INTRAMUSCULAR at 10:45

## 2023-03-29 ENCOUNTER — HOSPITAL ENCOUNTER (OUTPATIENT)
Dept: RADIATION ONCOLOGY | Facility: HOSPITAL | Age: 68
Setting detail: RADIATION/ONCOLOGY SERIES
End: 2023-03-29
Payer: MEDICARE

## 2023-03-29 PROBLEM — Z92.3 HISTORY OF RADIATION THERAPY: Status: ACTIVE | Noted: 2023-03-29

## 2023-03-30 ENCOUNTER — OFFICE VISIT (OUTPATIENT)
Dept: RADIATION ONCOLOGY | Facility: HOSPITAL | Age: 68
End: 2023-03-30
Payer: MEDICARE

## 2023-03-30 ENCOUNTER — HOSPITAL ENCOUNTER (OUTPATIENT)
Dept: INFUSION THERAPY | Age: 68
Discharge: HOME | End: 2023-03-30
Payer: MEDICARE

## 2023-03-30 VITALS
DIASTOLIC BLOOD PRESSURE: 78 MMHG | HEART RATE: 80 BPM | BODY MASS INDEX: 32 KG/M2 | SYSTOLIC BLOOD PRESSURE: 122 MMHG | WEIGHT: 180.6 LBS | OXYGEN SATURATION: 98 % | HEIGHT: 63 IN

## 2023-03-30 VITALS
SYSTOLIC BLOOD PRESSURE: 143 MMHG | BODY MASS INDEX: 31.89 KG/M2 | DIASTOLIC BLOOD PRESSURE: 67 MMHG | WEIGHT: 180 LBS | HEIGHT: 63 IN

## 2023-03-30 DIAGNOSIS — R89.9 ABNORMAL LABORATORY TEST: Primary | ICD-10-CM

## 2023-03-30 DIAGNOSIS — Z98.890 S/P LYMPH NODE BIOPSY: ICD-10-CM

## 2023-03-30 DIAGNOSIS — C50.411 MALIGNANT NEOPLASM OF UPPER-OUTER QUADRANT OF RIGHT BREAST IN FEMALE, ESTROGEN RECEPTOR POSITIVE: Primary | ICD-10-CM

## 2023-03-30 DIAGNOSIS — Z90.13 S/P BILATERAL MASTECTOMY: ICD-10-CM

## 2023-03-30 DIAGNOSIS — Z17.0 MALIGNANT NEOPLASM OF UPPER-OUTER QUADRANT OF RIGHT BREAST IN FEMALE, ESTROGEN RECEPTOR POSITIVE: Primary | ICD-10-CM

## 2023-03-30 DIAGNOSIS — Z92.3 HISTORY OF RADIATION THERAPY: ICD-10-CM

## 2023-03-30 DIAGNOSIS — Z78.9 NON-SMOKER: ICD-10-CM

## 2023-03-30 DIAGNOSIS — C50.919 INFILTRATING DUCTAL CARCINOMA (HCC): ICD-10-CM

## 2023-03-30 LAB
ERYTHROCYTE [DISTWIDTH] IN BLOOD BY AUTOMATED COUNT: 13.7 % (ref 11.7–14.4)
HCT VFR BLD AUTO: 39 % (ref 34.1–44.9)
HGB BLD-MCNC: 12.3 G/DL (ref 11.2–15.7)
LYMPHOCYTES # BLD: 1.6 K/UL (ref 1.18–3.74)
LYMPHOCYTES NFR BLD: 28.1 % (ref 19.3–53.1)
MCH RBC QN AUTO: 31.6 PG (ref 25.6–32.2)
MCHC RBC AUTO-ENTMCNC: 31.5 G/DL (ref 32.3–35.5)
MCV RBC AUTO: 100.3 FL (ref 79.4–94.8)
MONOCYTES # BLD: 0.5 K/UL (ref 0.24–0.82)
MONOCYTES NFR BLD: 9.1 % (ref 4.7–12.5)
NEUTROPHILS # BLD: 3.5 K/UL (ref 1.56–6.13)
NEUTS SEG NFR BLD: 62.8 % (ref 34–71.1)
PLATELET # BLD AUTO: 262 K/UL (ref 182–369)
PMV BLD AUTO: 9.3 FL (ref 7.4–10.4)
RBC # BLD AUTO: 3.89 M/UL (ref 3.93–5.22)
WBC # BLD AUTO: 5.6 K/UL (ref 3.98–10.04)

## 2023-03-30 PROCEDURE — G0463 HOSPITAL OUTPT CLINIC VISIT: HCPCS | Performed by: RADIOLOGY

## 2023-03-30 PROCEDURE — 36415 COLL VENOUS BLD VENIPUNCTURE: CPT

## 2023-03-30 PROCEDURE — 85025 COMPLETE CBC W/AUTO DIFF WBC: CPT

## 2023-03-30 PROCEDURE — 96402 CHEMO HORMON ANTINEOPL SQ/IM: CPT

## 2023-03-30 PROCEDURE — 6360000002 HC RX W HCPCS: Performed by: INTERNAL MEDICINE

## 2023-03-30 RX ORDER — LAMOTRIGINE 25 MG/1
500 TABLET ORAL ONCE
Status: COMPLETED | OUTPATIENT
Start: 2023-03-30 | End: 2023-03-30

## 2023-03-30 RX ADMIN — FULVESTRANT 500 MG: 50 INJECTION, SOLUTION INTRAMUSCULAR at 11:22

## 2023-04-26 NOTE — PROGRESS NOTES
MEDICAL ONCOLOGY PROGRESS NOTE    Pt Name: Manjit Robins  MRN: [id number]  YOB: 1955  Date of evaluation: 4/27/2023    HISTORY OF PRESENT ILLNESS:    The patient is a pleasant 69years old female who has a diagnosis of local recurrence of her breast cancer status postmastectomy. She is now status post capsulectomy and resection of the breast nodules. Pathology consistent with residual multicentric tumor. She is healing well from surgery. She is currently on adjuvant Faslodex due to intolerance to tamoxifen and AI. She is completing treatment with plastic surgery. She has an appointment is scheduled for radiation starting next month. Otherwise, she denies any new complaints. Diagnosis  Invasive lobular carcinoma, left breast cancer 2002  Tubular carcinoma, right breast, 2006  Low grade  Infiltrating ductal carcinoma, right breast, March 2016  Grade 1  ER 99%, AK 91%, HER-2 2+/equivocal, FISH-negative  Infiltrating ductal carcinoma, right breast, March 2018  Grade 1  ER 90.86%, AK 89.29%, HER-2 1+/negative  Onctoype DX recurrence score: 4, 2018  Infiltrating ductal carcinoma, right breast, Feb 2021  Grade 1   ER 98%, %, HER-2 1+/negative. BRCA negative, 2018   (A) ER 50.95%,AK 74.77%,HER2 1+ Negative; (B) ER 41.80,AK 78.95 HER2 1+ Negative  Meghan 81 gene genetic panel: Negative    Treatment Summary  2002 Left lumpectomy  2002 MammoSite radiation therapy  2002 Tamoxifen, Arimidex tried, but unable to tolerate adjuvant estrogen blocking therapy  2007 - 2012 Faslodex x5 years  4/25/16 Bilateral skin sparing mastectomy with right negative SLNB  2016 Fareston, Aromasin  4/24/18 Right breast lumpectomy  May 2018 - Aug 2019 Arimidex  Aug 2019 - March 2020 Tamoxifen x3 months.  Discontinued due to side effects  Feb 2021 Letrozole-unable to take due to side effects  1/11/22 Arimidex 1mg daily  5/25/22 Faslodex every 4 weeks  10/19/22 Right mastectomy flap excision by Rui Velez at

## 2023-04-27 ENCOUNTER — OFFICE VISIT (OUTPATIENT)
Dept: HEMATOLOGY | Age: 68
End: 2023-04-27
Payer: MEDICARE

## 2023-04-27 ENCOUNTER — HOSPITAL ENCOUNTER (OUTPATIENT)
Dept: INFUSION THERAPY | Age: 68
Discharge: HOME | End: 2023-04-27
Payer: MEDICARE

## 2023-04-27 VITALS
BODY MASS INDEX: 31.89 KG/M2 | HEART RATE: 81 BPM | HEIGHT: 63 IN | SYSTOLIC BLOOD PRESSURE: 130 MMHG | OXYGEN SATURATION: 99 % | WEIGHT: 180 LBS | DIASTOLIC BLOOD PRESSURE: 84 MMHG

## 2023-04-27 DIAGNOSIS — C50.911 LOCAL RECURRENCE OF CANCER OF RIGHT BREAST (HCC): ICD-10-CM

## 2023-04-27 DIAGNOSIS — C50.919 INFILTRATING DUCTAL CARCINOMA (HCC): ICD-10-CM

## 2023-04-27 DIAGNOSIS — C50.919 INFILTRATING DUCTAL CARCINOMA (HCC): Primary | ICD-10-CM

## 2023-04-27 DIAGNOSIS — Z71.89 CARE PLAN DISCUSSED WITH PATIENT: ICD-10-CM

## 2023-04-27 DIAGNOSIS — Z79.818 USE OF FULVESTRANT (FASLODEX): ICD-10-CM

## 2023-04-27 DIAGNOSIS — R89.9 ABNORMAL LABORATORY TEST: Primary | ICD-10-CM

## 2023-04-27 LAB
BASOPHILS # BLD: 0.06 K/UL (ref 0.01–0.08)
BASOPHILS NFR BLD: 1 % (ref 0.1–1.2)
EOSINOPHIL # BLD: 0.29 K/UL (ref 0.04–0.54)
EOSINOPHIL NFR BLD: 5 % (ref 0.7–7)
ERYTHROCYTE [DISTWIDTH] IN BLOOD BY AUTOMATED COUNT: 13.8 % (ref 11.7–14.4)
HCT VFR BLD AUTO: 39 % (ref 34.1–44.9)
HGB BLD-MCNC: 11.9 G/DL (ref 11.2–15.7)
LYMPHOCYTES # BLD: 1.61 K/UL (ref 1.18–3.74)
LYMPHOCYTES NFR BLD: 27.8 % (ref 19.3–53.1)
MCH RBC QN AUTO: 31.2 PG (ref 25.6–32.2)
MCHC RBC AUTO-ENTMCNC: 30.5 G/DL (ref 32.3–35.5)
MCV RBC AUTO: 102.4 FL (ref 79.4–94.8)
MONOCYTES # BLD: 0.52 K/UL (ref 0.24–0.82)
MONOCYTES NFR BLD: 9 % (ref 4.7–12.5)
NEUTROPHILS # BLD: 3.29 K/UL (ref 1.56–6.13)
NEUTS SEG NFR BLD: 56.9 % (ref 34–71.1)
PLATELET # BLD AUTO: 298 K/UL (ref 182–369)
PMV BLD AUTO: 10.8 FL (ref 7.4–10.4)
RBC # BLD AUTO: 3.81 M/UL (ref 3.93–5.22)
WBC # BLD AUTO: 5.79 K/UL (ref 3.98–10.04)

## 2023-04-27 PROCEDURE — 99212 OFFICE O/P EST SF 10 MIN: CPT

## 2023-04-27 PROCEDURE — 36415 COLL VENOUS BLD VENIPUNCTURE: CPT

## 2023-04-27 PROCEDURE — G8417 CALC BMI ABV UP PARAM F/U: HCPCS | Performed by: INTERNAL MEDICINE

## 2023-04-27 PROCEDURE — 1123F ACP DISCUSS/DSCN MKR DOCD: CPT | Performed by: INTERNAL MEDICINE

## 2023-04-27 PROCEDURE — 85025 COMPLETE CBC W/AUTO DIFF WBC: CPT

## 2023-04-27 PROCEDURE — 6360000002 HC RX W HCPCS: Performed by: INTERNAL MEDICINE

## 2023-04-27 PROCEDURE — 96402 CHEMO HORMON ANTINEOPL SQ/IM: CPT

## 2023-04-27 PROCEDURE — 3017F COLORECTAL CA SCREEN DOC REV: CPT | Performed by: INTERNAL MEDICINE

## 2023-04-27 PROCEDURE — G8427 DOCREV CUR MEDS BY ELIG CLIN: HCPCS | Performed by: INTERNAL MEDICINE

## 2023-04-27 PROCEDURE — 1036F TOBACCO NON-USER: CPT | Performed by: INTERNAL MEDICINE

## 2023-04-27 PROCEDURE — G8400 PT W/DXA NO RESULTS DOC: HCPCS | Performed by: INTERNAL MEDICINE

## 2023-04-27 PROCEDURE — 1090F PRES/ABSN URINE INCON ASSESS: CPT | Performed by: INTERNAL MEDICINE

## 2023-04-27 PROCEDURE — 99213 OFFICE O/P EST LOW 20 MIN: CPT | Performed by: INTERNAL MEDICINE

## 2023-04-27 RX ORDER — LAMOTRIGINE 25 MG/1
500 TABLET ORAL ONCE
Status: COMPLETED | OUTPATIENT
Start: 2023-04-27 | End: 2023-04-27

## 2023-04-27 RX ADMIN — FULVESTRANT 500 MG: 50 INJECTION, SOLUTION INTRAMUSCULAR at 12:10

## 2023-05-26 ENCOUNTER — HOSPITAL ENCOUNTER (OUTPATIENT)
Dept: INFUSION THERAPY | Age: 68
Discharge: HOME | End: 2023-05-26
Payer: MEDICARE

## 2023-05-26 VITALS
HEART RATE: 88 BPM | HEIGHT: 63 IN | OXYGEN SATURATION: 98 % | DIASTOLIC BLOOD PRESSURE: 76 MMHG | SYSTOLIC BLOOD PRESSURE: 154 MMHG | BODY MASS INDEX: 31.95 KG/M2 | WEIGHT: 180.3 LBS

## 2023-05-26 DIAGNOSIS — C50.919 INFILTRATING DUCTAL CARCINOMA (HCC): Primary | ICD-10-CM

## 2023-05-26 DIAGNOSIS — R89.9 ABNORMAL LABORATORY TEST: ICD-10-CM

## 2023-05-26 DIAGNOSIS — R79.89 ELEVATED LIVER FUNCTION TESTS: Primary | ICD-10-CM

## 2023-05-26 LAB
ALBUMIN SERPL-MCNC: 3.8 G/DL (ref 3.5–5.2)
ALP SERPL-CCNC: 189 U/L (ref 35–104)
ALT SERPL-CCNC: 121 U/L (ref 9–52)
ANION GAP SERPL CALCULATED.3IONS-SCNC: 7 MMOL/L (ref 7–19)
AST SERPL-CCNC: 74 U/L (ref 14–36)
BILIRUB SERPL-MCNC: 0.4 MG/DL (ref 0.2–1.3)
BUN SERPL-MCNC: 12 MG/DL (ref 7–17)
CALCIUM SERPL-MCNC: 9.2 MG/DL (ref 8.4–10.2)
CHLORIDE SERPL-SCNC: 105 MMOL/L (ref 98–111)
CO2 SERPL-SCNC: 28 MMOL/L (ref 22–29)
CREAT SERPL-MCNC: 0.7 MG/DL (ref 0.5–1)
ERYTHROCYTE [DISTWIDTH] IN BLOOD BY AUTOMATED COUNT: 14.4 % (ref 11.7–14.4)
GLOBULIN: 3.1 G/DL
GLUCOSE SERPL-MCNC: 111 MG/DL (ref 74–106)
HCT VFR BLD AUTO: 34.7 % (ref 34.1–44.9)
HGB BLD-MCNC: 11 G/DL (ref 11.2–15.7)
LYMPHOCYTES # BLD: 1.19 K/UL (ref 1.18–3.74)
LYMPHOCYTES NFR BLD: 26.8 % (ref 19.3–53.1)
MCH RBC QN AUTO: 31.4 PG (ref 25.6–32.2)
MCHC RBC AUTO-ENTMCNC: 31.7 G/DL (ref 32.3–35.5)
MCV RBC AUTO: 99.1 FL (ref 79.4–94.8)
MONOCYTES # BLD: 0.38 K/UL (ref 0.24–0.82)
MONOCYTES NFR BLD: 8.6 % (ref 4.7–12.5)
NEUTROPHILS # BLD: 2.68 K/UL (ref 1.56–6.13)
NEUTS SEG NFR BLD: 60.3 % (ref 34–71.1)
PLATELET # BLD AUTO: 313 K/UL (ref 182–369)
PMV BLD AUTO: 9.3 FL (ref 7.4–10.4)
POTASSIUM SERPL-SCNC: 4.1 MMOL/L (ref 3.5–5.1)
PROT SERPL-MCNC: 7 G/DL (ref 6.3–8.2)
RBC # BLD AUTO: 3.5 M/UL (ref 3.93–5.22)
SODIUM SERPL-SCNC: 140 MMOL/L (ref 137–145)
WBC # BLD AUTO: 4.44 K/UL (ref 3.98–10.04)

## 2023-05-26 PROCEDURE — 36415 COLL VENOUS BLD VENIPUNCTURE: CPT

## 2023-05-26 PROCEDURE — 96402 CHEMO HORMON ANTINEOPL SQ/IM: CPT

## 2023-05-26 PROCEDURE — 96401 CHEMO ANTI-NEOPL SQ/IM: CPT

## 2023-05-26 PROCEDURE — 85025 COMPLETE CBC W/AUTO DIFF WBC: CPT

## 2023-05-26 PROCEDURE — 6360000002 HC RX W HCPCS: Performed by: INTERNAL MEDICINE

## 2023-05-26 PROCEDURE — 80053 COMPREHEN METABOLIC PANEL: CPT

## 2023-05-26 RX ORDER — ACETAMINOPHEN 325 MG/1
650 TABLET ORAL
OUTPATIENT
Start: 2023-05-26

## 2023-05-26 RX ORDER — LAMOTRIGINE 25 MG/1
500 TABLET ORAL ONCE
Status: COMPLETED | OUTPATIENT
Start: 2023-05-26 | End: 2023-05-26

## 2023-05-26 RX ORDER — SODIUM CHLORIDE 9 MG/ML
INJECTION, SOLUTION INTRAVENOUS CONTINUOUS
OUTPATIENT
Start: 2023-05-26

## 2023-05-26 RX ORDER — ALBUTEROL SULFATE 90 UG/1
4 AEROSOL, METERED RESPIRATORY (INHALATION) PRN
OUTPATIENT
Start: 2023-05-26

## 2023-05-26 RX ORDER — MEPERIDINE HYDROCHLORIDE 50 MG/ML
12.5 INJECTION INTRAMUSCULAR; INTRAVENOUS; SUBCUTANEOUS PRN
OUTPATIENT
Start: 2023-05-26

## 2023-05-26 RX ORDER — DIPHENHYDRAMINE HYDROCHLORIDE 50 MG/ML
50 INJECTION INTRAMUSCULAR; INTRAVENOUS
OUTPATIENT
Start: 2023-05-26

## 2023-05-26 RX ORDER — LAMOTRIGINE 25 MG/1
500 TABLET ORAL ONCE
Status: CANCELLED
Start: 2023-05-26 | End: 2023-05-26

## 2023-05-26 RX ORDER — ONDANSETRON 2 MG/ML
8 INJECTION INTRAMUSCULAR; INTRAVENOUS
OUTPATIENT
Start: 2023-05-26

## 2023-05-26 RX ORDER — EPINEPHRINE 1 MG/ML
0.3 INJECTION, SOLUTION, CONCENTRATE INTRAVENOUS PRN
OUTPATIENT
Start: 2023-05-26

## 2023-05-26 RX ORDER — FAMOTIDINE 10 MG/ML
20 INJECTION, SOLUTION INTRAVENOUS
OUTPATIENT
Start: 2023-05-26

## 2023-05-26 RX ADMIN — FULVESTRANT 500 MG: 50 INJECTION, SOLUTION INTRAMUSCULAR at 10:49

## 2023-05-26 NOTE — PROGRESS NOTES
Lab Results   Component Value Date    WBC 4.44 05/26/2023    HGB 11.0 (L) 05/26/2023    HCT 34.7 05/26/2023    MCV 99.1 (H) 05/26/2023     05/26/2023     Lab Results   Component Value Date    NEUTROABS 2.68 05/26/2023

## 2023-06-23 ENCOUNTER — HOSPITAL ENCOUNTER (OUTPATIENT)
Dept: INFUSION THERAPY | Age: 68
Discharge: HOME | End: 2023-06-23
Payer: MEDICARE

## 2023-06-23 VITALS
DIASTOLIC BLOOD PRESSURE: 90 MMHG | WEIGHT: 181.4 LBS | OXYGEN SATURATION: 97 % | HEART RATE: 78 BPM | BODY MASS INDEX: 32.13 KG/M2 | SYSTOLIC BLOOD PRESSURE: 138 MMHG

## 2023-06-23 DIAGNOSIS — R74.8 ELEVATED LIVER ENZYMES: Primary | ICD-10-CM

## 2023-06-23 DIAGNOSIS — C50.919 INFILTRATING DUCTAL CARCINOMA (HCC): ICD-10-CM

## 2023-06-23 DIAGNOSIS — R89.9 ABNORMAL LABORATORY TEST: Primary | ICD-10-CM

## 2023-06-23 LAB
ALBUMIN SERPL-MCNC: 3.9 G/DL (ref 3.5–5.2)
ALP SERPL-CCNC: 176 U/L (ref 35–104)
ALT SERPL-CCNC: 46 U/L (ref 9–52)
ANION GAP SERPL CALCULATED.3IONS-SCNC: 9 MMOL/L (ref 7–19)
AST SERPL-CCNC: 39 U/L (ref 14–36)
BILIRUB SERPL-MCNC: 0.5 MG/DL (ref 0.2–1.3)
BUN SERPL-MCNC: 10 MG/DL (ref 7–17)
CALCIUM SERPL-MCNC: 8.6 MG/DL (ref 8.4–10.2)
CHLORIDE SERPL-SCNC: 101 MMOL/L (ref 98–111)
CO2 SERPL-SCNC: 31 MMOL/L (ref 22–29)
CREAT SERPL-MCNC: 0.6 MG/DL (ref 0.5–1)
ERYTHROCYTE [DISTWIDTH] IN BLOOD BY AUTOMATED COUNT: 13.9 % (ref 11.7–14.4)
GLOBULIN: 3.3 G/DL
GLUCOSE SERPL-MCNC: 87 MG/DL (ref 74–106)
HCT VFR BLD AUTO: 35.6 % (ref 34.1–44.9)
HGB BLD-MCNC: 11.4 G/DL (ref 11.2–15.7)
LYMPHOCYTES # BLD: 1.15 K/UL (ref 1.18–3.74)
LYMPHOCYTES NFR BLD: 26.4 % (ref 19.3–53.1)
MCH RBC QN AUTO: 31.8 PG (ref 25.6–32.2)
MCHC RBC AUTO-ENTMCNC: 32 G/DL (ref 32.3–35.5)
MCV RBC AUTO: 99.2 FL (ref 79.4–94.8)
MONOCYTES # BLD: 0.32 K/UL (ref 0.24–0.82)
MONOCYTES NFR BLD: 7.3 % (ref 4.7–12.5)
NEUTROPHILS # BLD: 2.54 K/UL (ref 1.56–6.13)
NEUTS SEG NFR BLD: 58.3 % (ref 34–71.1)
PLATELET # BLD AUTO: 330 K/UL (ref 182–369)
PMV BLD AUTO: 9.2 FL (ref 7.4–10.4)
POTASSIUM SERPL-SCNC: 3.6 MMOL/L (ref 3.5–5.1)
PROT SERPL-MCNC: 7.2 G/DL (ref 6.3–8.2)
RBC # BLD AUTO: 3.59 M/UL (ref 3.93–5.22)
SODIUM SERPL-SCNC: 141 MMOL/L (ref 137–145)
WBC # BLD AUTO: 4.36 K/UL (ref 3.98–10.04)

## 2023-06-23 PROCEDURE — 85025 COMPLETE CBC W/AUTO DIFF WBC: CPT

## 2023-06-23 PROCEDURE — 36415 COLL VENOUS BLD VENIPUNCTURE: CPT

## 2023-06-23 PROCEDURE — 80053 COMPREHEN METABOLIC PANEL: CPT

## 2023-06-23 PROCEDURE — 96402 CHEMO HORMON ANTINEOPL SQ/IM: CPT

## 2023-06-23 PROCEDURE — 96372 THER/PROPH/DIAG INJ SC/IM: CPT

## 2023-06-23 PROCEDURE — 6360000002 HC RX W HCPCS: Performed by: INTERNAL MEDICINE

## 2023-06-23 RX ORDER — LAMOTRIGINE 25 MG/1
500 TABLET ORAL ONCE
Status: COMPLETED | OUTPATIENT
Start: 2023-06-23 | End: 2023-06-23

## 2023-06-23 RX ADMIN — FULVESTRANT 500 MG: 50 INJECTION, SOLUTION INTRAMUSCULAR at 09:48

## 2023-06-28 ENCOUNTER — HOSPITAL ENCOUNTER (OUTPATIENT)
Dept: ULTRASOUND IMAGING | Age: 68
Discharge: HOME | End: 2023-06-28
Attending: INTERNAL MEDICINE
Payer: MEDICARE

## 2023-06-28 DIAGNOSIS — R74.8 ELEVATED LIVER ENZYMES: ICD-10-CM

## 2023-06-28 PROCEDURE — 76705 ECHO EXAM OF ABDOMEN: CPT

## 2023-06-30 DIAGNOSIS — C50.911 LOCAL RECURRENCE OF CANCER OF RIGHT BREAST (HCC): ICD-10-CM

## 2023-06-30 DIAGNOSIS — C50.919 INFILTRATING DUCTAL CARCINOMA (HCC): Primary | ICD-10-CM

## 2023-06-30 DIAGNOSIS — Z79.818 PROPHYLACTIC USE OF FULVESTRANT (FASLODEX): ICD-10-CM

## 2023-07-21 ENCOUNTER — HOSPITAL ENCOUNTER (OUTPATIENT)
Dept: INFUSION THERAPY | Age: 68
Discharge: HOME | End: 2023-07-21
Payer: MEDICARE

## 2023-07-21 VITALS
OXYGEN SATURATION: 97 % | HEART RATE: 69 BPM | BODY MASS INDEX: 32.33 KG/M2 | DIASTOLIC BLOOD PRESSURE: 70 MMHG | SYSTOLIC BLOOD PRESSURE: 140 MMHG | WEIGHT: 182.5 LBS

## 2023-07-21 DIAGNOSIS — Z79.818 PROPHYLACTIC USE OF FULVESTRANT (FASLODEX): ICD-10-CM

## 2023-07-21 DIAGNOSIS — C50.919 INFILTRATING DUCTAL CARCINOMA (HCC): ICD-10-CM

## 2023-07-21 DIAGNOSIS — R89.9 ABNORMAL LABORATORY TEST: Primary | ICD-10-CM

## 2023-07-21 DIAGNOSIS — C50.911 LOCAL RECURRENCE OF CANCER OF RIGHT BREAST (HCC): ICD-10-CM

## 2023-07-21 LAB
ALBUMIN SERPL-MCNC: 3.9 G/DL (ref 3.5–5.2)
ALP SERPL-CCNC: 149 U/L (ref 35–104)
ALT SERPL-CCNC: 34 U/L (ref 9–52)
ANION GAP SERPL CALCULATED.3IONS-SCNC: 7 MMOL/L (ref 7–19)
AST SERPL-CCNC: 34 U/L (ref 14–36)
BILIRUB SERPL-MCNC: 0.3 MG/DL (ref 0.2–1.3)
BUN SERPL-MCNC: 11 MG/DL (ref 7–17)
CALCIUM SERPL-MCNC: 9.4 MG/DL (ref 8.4–10.2)
CHLORIDE SERPL-SCNC: 106 MMOL/L (ref 98–111)
CO2 SERPL-SCNC: 30 MMOL/L (ref 22–29)
CREAT SERPL-MCNC: 0.6 MG/DL (ref 0.5–1)
ERYTHROCYTE [DISTWIDTH] IN BLOOD BY AUTOMATED COUNT: 13.7 % (ref 11.7–14.4)
GLOBULIN: 3.2 G/DL
GLUCOSE SERPL-MCNC: 94 MG/DL (ref 74–106)
HCT VFR BLD AUTO: 36.4 % (ref 34.1–44.9)
HGB BLD-MCNC: 11.3 G/DL (ref 11.2–15.7)
LYMPHOCYTES # BLD: 1.13 K/UL (ref 1.18–3.74)
LYMPHOCYTES NFR BLD: 23.5 % (ref 19.3–53.1)
MCH RBC QN AUTO: 30.4 PG (ref 25.6–32.2)
MCHC RBC AUTO-ENTMCNC: 31 G/DL (ref 32.3–35.5)
MCV RBC AUTO: 97.8 FL (ref 79.4–94.8)
MONOCYTES # BLD: 0.41 K/UL (ref 0.24–0.82)
MONOCYTES NFR BLD: 8.5 % (ref 4.7–12.5)
NEUTROPHILS # BLD: 2.97 K/UL (ref 1.56–6.13)
NEUTS SEG NFR BLD: 61.8 % (ref 34–71.1)
PLATELET # BLD AUTO: 311 K/UL (ref 182–369)
PMV BLD AUTO: 9.4 FL (ref 7.4–10.4)
POTASSIUM SERPL-SCNC: 4.9 MMOL/L (ref 3.5–5.1)
PROT SERPL-MCNC: 7.1 G/DL (ref 6.3–8.2)
RBC # BLD AUTO: 3.72 M/UL (ref 3.93–5.22)
SODIUM SERPL-SCNC: 143 MMOL/L (ref 137–145)
WBC # BLD AUTO: 4.81 K/UL (ref 3.98–10.04)

## 2023-07-21 PROCEDURE — 36415 COLL VENOUS BLD VENIPUNCTURE: CPT

## 2023-07-21 PROCEDURE — 85025 COMPLETE CBC W/AUTO DIFF WBC: CPT

## 2023-07-21 PROCEDURE — 96401 CHEMO ANTI-NEOPL SQ/IM: CPT

## 2023-07-21 PROCEDURE — 96402 CHEMO HORMON ANTINEOPL SQ/IM: CPT

## 2023-07-21 PROCEDURE — 6360000002 HC RX W HCPCS: Performed by: INTERNAL MEDICINE

## 2023-07-21 PROCEDURE — 80053 COMPREHEN METABOLIC PANEL: CPT

## 2023-07-21 RX ORDER — LAMOTRIGINE 25 MG/1
500 TABLET ORAL ONCE
Status: COMPLETED | OUTPATIENT
Start: 2023-07-21 | End: 2023-07-21

## 2023-07-21 RX ADMIN — FULVESTRANT 500 MG: 50 INJECTION, SOLUTION INTRAMUSCULAR at 09:29

## 2023-08-16 NOTE — PROGRESS NOTES
MEDICAL ONCOLOGY PROGRESS NOTE    Pt Name: Amanda Ulrich  MRN: [id number]  YOB: 1955  Date of evaluation: 8/18/2023    HISTORY OF PRESENT ILLNESS:    The patient is a pleasant 69years old female who has a diagnosis of local recurrence of her breast cancer status postmastectomy. She is now status post capsulectomy and resection of the breast nodules. Pathology consistent with residual multicentric tumor. She is healing well from surgery. She is currently on adjuvant Faslodex due to intolerance to tamoxifen and AI. She is completing treatment with plastic surgery. She has an appointment is scheduled for radiation starting next month. Otherwise, she denies any new complaints. She is scheduled to undergo plastic surgery. She denies any other new complaints. Tolerated treatment well. Diagnosis  Invasive lobular carcinoma, left breast cancer 2002  Tubular carcinoma, right breast, 2006  Low grade  Infiltrating ductal carcinoma, right breast, March 2016  Grade 1  ER 99%, NJ 91%, HER-2 2+/equivocal, FISH-negative  Infiltrating ductal carcinoma, right breast, March 2018  Grade 1  ER 90.86%, NJ 89.29%, HER-2 1+/negative  Onctoype DX recurrence score: 4, 2018  Infiltrating ductal carcinoma, right breast, Feb 2021  Grade 1   ER 98%, %, HER-2 1+/negative. BRCA negative, 2018   (A) ER 50.95%,NJ 74.77%,HER2 1+ Negative; (B) ER 41.80,NJ 78.95 HER2 1+ Negative  Meghan 81 gene genetic panel: Negative    Treatment Summary  2002 Left lumpectomy  2002 MammoSite radiation therapy  2002 Tamoxifen, Arimidex tried, but unable to tolerate adjuvant estrogen blocking therapy  2007 - 2012 Faslodex x5 years  4/25/16 Bilateral skin sparing mastectomy with right negative SLNB  2016 Fareston, Aromasin  4/24/18 Right breast lumpectomy  May 2018 - Aug 2019 Arimidex  Aug 2019 - March 2020 Tamoxifen x3 months.  Discontinued due to side effects  Feb 2021 Letrozole-unable to take due to side effects  1/11/22 Arimidex

## 2023-08-18 ENCOUNTER — HOSPITAL ENCOUNTER (OUTPATIENT)
Dept: INFUSION THERAPY | Age: 68
Discharge: HOME | End: 2023-08-18
Payer: MEDICARE

## 2023-08-18 ENCOUNTER — OFFICE VISIT (OUTPATIENT)
Dept: HEMATOLOGY | Age: 68
End: 2023-08-18
Payer: MEDICARE

## 2023-08-18 VITALS
WEIGHT: 189.9 LBS | HEART RATE: 68 BPM | DIASTOLIC BLOOD PRESSURE: 82 MMHG | TEMPERATURE: 97.5 F | OXYGEN SATURATION: 95 % | HEIGHT: 63 IN | SYSTOLIC BLOOD PRESSURE: 142 MMHG | BODY MASS INDEX: 33.65 KG/M2

## 2023-08-18 DIAGNOSIS — Z71.89 CARE PLAN DISCUSSED WITH PATIENT: ICD-10-CM

## 2023-08-18 DIAGNOSIS — C50.919 INFILTRATING DUCTAL CARCINOMA (HCC): Primary | ICD-10-CM

## 2023-08-18 DIAGNOSIS — Z79.818 USE OF FULVESTRANT (FASLODEX): ICD-10-CM

## 2023-08-18 PROCEDURE — 99214 OFFICE O/P EST MOD 30 MIN: CPT | Performed by: INTERNAL MEDICINE

## 2023-08-18 PROCEDURE — 1036F TOBACCO NON-USER: CPT | Performed by: INTERNAL MEDICINE

## 2023-08-18 PROCEDURE — 6360000002 HC RX W HCPCS

## 2023-08-18 PROCEDURE — G8417 CALC BMI ABV UP PARAM F/U: HCPCS | Performed by: INTERNAL MEDICINE

## 2023-08-18 PROCEDURE — 1123F ACP DISCUSS/DSCN MKR DOCD: CPT | Performed by: INTERNAL MEDICINE

## 2023-08-18 PROCEDURE — 96372 THER/PROPH/DIAG INJ SC/IM: CPT

## 2023-08-18 PROCEDURE — 1090F PRES/ABSN URINE INCON ASSESS: CPT | Performed by: INTERNAL MEDICINE

## 2023-08-18 PROCEDURE — G8400 PT W/DXA NO RESULTS DOC: HCPCS | Performed by: INTERNAL MEDICINE

## 2023-08-18 PROCEDURE — G8427 DOCREV CUR MEDS BY ELIG CLIN: HCPCS | Performed by: INTERNAL MEDICINE

## 2023-08-18 PROCEDURE — 96402 CHEMO HORMON ANTINEOPL SQ/IM: CPT

## 2023-08-18 PROCEDURE — 99212 OFFICE O/P EST SF 10 MIN: CPT

## 2023-08-18 PROCEDURE — 3017F COLORECTAL CA SCREEN DOC REV: CPT | Performed by: INTERNAL MEDICINE

## 2023-08-18 RX ORDER — OMEPRAZOLE 20 MG/1
CAPSULE, DELAYED RELEASE ORAL DAILY
COMMUNITY
Start: 2023-08-15

## 2023-08-18 RX ORDER — LAMOTRIGINE 25 MG/1
500 TABLET ORAL ONCE
Status: COMPLETED | OUTPATIENT
Start: 2023-08-18 | End: 2023-08-18

## 2023-08-18 RX ADMIN — FULVESTRANT 500 MG: 50 INJECTION, SOLUTION INTRAMUSCULAR at 10:07

## 2023-09-15 ENCOUNTER — HOSPITAL ENCOUNTER (OUTPATIENT)
Dept: INFUSION THERAPY | Age: 68
Discharge: HOME | End: 2023-09-15
Payer: MEDICARE

## 2023-09-15 VITALS
BODY MASS INDEX: 32.28 KG/M2 | WEIGHT: 182.2 LBS | SYSTOLIC BLOOD PRESSURE: 150 MMHG | DIASTOLIC BLOOD PRESSURE: 88 MMHG | OXYGEN SATURATION: 97 % | HEART RATE: 77 BPM | HEIGHT: 63 IN

## 2023-09-15 DIAGNOSIS — C50.919 INFILTRATING DUCTAL CARCINOMA (HCC): Primary | ICD-10-CM

## 2023-09-15 LAB
ALBUMIN SERPL-MCNC: 4.1 G/DL (ref 3.5–5.2)
ALP SERPL-CCNC: 207 U/L (ref 35–104)
ALT SERPL-CCNC: 42 U/L (ref 9–52)
ANION GAP SERPL CALCULATED.3IONS-SCNC: 9 MMOL/L (ref 7–19)
AST SERPL-CCNC: 44 U/L (ref 14–36)
BASOPHILS # BLD: 0.03 K/UL (ref 0.01–0.08)
BASOPHILS NFR BLD: 0.7 % (ref 0.1–1.2)
BILIRUB SERPL-MCNC: 0.4 MG/DL (ref 0.2–1.3)
BUN SERPL-MCNC: 11 MG/DL (ref 7–17)
CALCIUM SERPL-MCNC: 9.6 MG/DL (ref 8.4–10.2)
CHLORIDE SERPL-SCNC: 102 MMOL/L (ref 98–111)
CO2 SERPL-SCNC: 32 MMOL/L (ref 22–29)
CREAT SERPL-MCNC: 0.6 MG/DL (ref 0.5–1)
EOSINOPHIL # BLD: 0.27 K/UL (ref 0.04–0.54)
EOSINOPHIL NFR BLD: 5.9 % (ref 0.7–7)
ERYTHROCYTE [DISTWIDTH] IN BLOOD BY AUTOMATED COUNT: 14.1 % (ref 11.7–14.4)
GLOBULIN: 3.3 G/DL
GLUCOSE SERPL-MCNC: 109 MG/DL (ref 74–106)
HCT VFR BLD AUTO: 33.9 % (ref 34.1–44.9)
HGB BLD-MCNC: 11.2 G/DL (ref 11.2–15.7)
LYMPHOCYTES # BLD: 1.28 K/UL (ref 1.18–3.74)
LYMPHOCYTES NFR BLD: 27.9 % (ref 19.3–53.1)
MCH RBC QN AUTO: 30.4 PG (ref 25.6–32.2)
MCHC RBC AUTO-ENTMCNC: 33 G/DL (ref 32.3–35.5)
MCV RBC AUTO: 92.1 FL (ref 79.4–94.8)
MONOCYTES # BLD: 0.37 K/UL (ref 0.24–0.82)
MONOCYTES NFR BLD: 8.1 % (ref 4.7–12.5)
NEUTROPHILS # BLD: 2.64 K/UL (ref 1.56–6.13)
NEUTS SEG NFR BLD: 57.4 % (ref 34–71.1)
PLATELET # BLD AUTO: 390 K/UL (ref 182–369)
PMV BLD AUTO: 8.9 FL (ref 7.4–10.4)
POTASSIUM SERPL-SCNC: 4.7 MMOL/L (ref 3.5–5.1)
PROT SERPL-MCNC: 7.4 G/DL (ref 6.3–8.2)
RBC # BLD AUTO: 3.68 M/UL (ref 3.93–5.22)
SODIUM SERPL-SCNC: 143 MMOL/L (ref 137–145)
WBC # BLD AUTO: 4.59 K/UL (ref 3.98–10.04)

## 2023-09-15 PROCEDURE — 96402 CHEMO HORMON ANTINEOPL SQ/IM: CPT

## 2023-09-15 PROCEDURE — 36415 COLL VENOUS BLD VENIPUNCTURE: CPT

## 2023-09-15 PROCEDURE — 85025 COMPLETE CBC W/AUTO DIFF WBC: CPT

## 2023-09-15 PROCEDURE — 96401 CHEMO ANTI-NEOPL SQ/IM: CPT

## 2023-09-15 PROCEDURE — 6360000002 HC RX W HCPCS: Performed by: INTERNAL MEDICINE

## 2023-09-15 PROCEDURE — 80053 COMPREHEN METABOLIC PANEL: CPT

## 2023-09-15 RX ORDER — LAMOTRIGINE 25 MG/1
500 TABLET ORAL ONCE
Status: COMPLETED | OUTPATIENT
Start: 2023-09-15 | End: 2023-09-15

## 2023-09-15 RX ADMIN — FULVESTRANT 500 MG: 50 INJECTION, SOLUTION INTRAMUSCULAR at 09:44

## 2023-10-13 ENCOUNTER — HOSPITAL ENCOUNTER (OUTPATIENT)
Dept: INFUSION THERAPY | Age: 68
Discharge: HOME | End: 2023-10-13
Payer: MEDICARE

## 2023-10-13 VITALS
WEIGHT: 183.4 LBS | HEIGHT: 63 IN | DIASTOLIC BLOOD PRESSURE: 78 MMHG | BODY MASS INDEX: 32.5 KG/M2 | SYSTOLIC BLOOD PRESSURE: 130 MMHG | OXYGEN SATURATION: 97 % | HEART RATE: 72 BPM

## 2023-10-13 DIAGNOSIS — C50.919 INFILTRATING DUCTAL CARCINOMA (HCC): Primary | ICD-10-CM

## 2023-10-13 LAB
ALBUMIN SERPL-MCNC: 4.2 G/DL (ref 3.5–5.2)
ALP SERPL-CCNC: 149 U/L (ref 35–104)
ALT SERPL-CCNC: 54 U/L (ref 9–52)
ANION GAP SERPL CALCULATED.3IONS-SCNC: 7 MMOL/L (ref 7–19)
AST SERPL-CCNC: 53 U/L (ref 14–36)
BASOPHILS # BLD: 0.03 K/UL (ref 0.01–0.08)
BASOPHILS NFR BLD: 0.6 % (ref 0.1–1.2)
BILIRUB SERPL-MCNC: 0.4 MG/DL (ref 0.2–1.3)
BUN SERPL-MCNC: 12 MG/DL (ref 7–17)
CALCIUM SERPL-MCNC: 9.5 MG/DL (ref 8.4–10.2)
CHLORIDE SERPL-SCNC: 105 MMOL/L (ref 98–111)
CO2 SERPL-SCNC: 28 MMOL/L (ref 22–29)
CREAT SERPL-MCNC: 0.7 MG/DL (ref 0.5–1)
EOSINOPHIL # BLD: 0.19 K/UL (ref 0.04–0.54)
EOSINOPHIL NFR BLD: 3.9 % (ref 0.7–7)
ERYTHROCYTE [DISTWIDTH] IN BLOOD BY AUTOMATED COUNT: 14.6 % (ref 11.7–14.4)
GLOBULIN: 3.4 G/DL
GLUCOSE SERPL-MCNC: 105 MG/DL (ref 74–106)
HCT VFR BLD AUTO: 32.7 % (ref 34.1–44.9)
HGB BLD-MCNC: 10.6 G/DL (ref 11.2–15.7)
LYMPHOCYTES # BLD: 1.34 K/UL (ref 1.18–3.74)
LYMPHOCYTES NFR BLD: 27.5 % (ref 19.3–53.1)
MCH RBC QN AUTO: 30.5 PG (ref 25.6–32.2)
MCHC RBC AUTO-ENTMCNC: 32.4 G/DL (ref 32.3–35.5)
MCV RBC AUTO: 94 FL (ref 79.4–94.8)
MONOCYTES # BLD: 0.33 K/UL (ref 0.24–0.82)
MONOCYTES NFR BLD: 6.8 % (ref 4.7–12.5)
NEUTROPHILS # BLD: 2.97 K/UL (ref 1.56–6.13)
NEUTS SEG NFR BLD: 61 % (ref 34–71.1)
PLATELET # BLD AUTO: 329 K/UL (ref 182–369)
PMV BLD AUTO: 9.5 FL (ref 7.4–10.4)
POTASSIUM SERPL-SCNC: 4.1 MMOL/L (ref 3.5–5.1)
PROT SERPL-MCNC: 7.6 G/DL (ref 6.3–8.2)
RBC # BLD AUTO: 3.48 M/UL (ref 3.93–5.22)
SODIUM SERPL-SCNC: 140 MMOL/L (ref 137–145)
WBC # BLD AUTO: 4.87 K/UL (ref 3.98–10.04)

## 2023-10-13 PROCEDURE — 96402 CHEMO HORMON ANTINEOPL SQ/IM: CPT

## 2023-10-13 PROCEDURE — 6360000002 HC RX W HCPCS: Performed by: INTERNAL MEDICINE

## 2023-10-13 PROCEDURE — 85025 COMPLETE CBC W/AUTO DIFF WBC: CPT

## 2023-10-13 PROCEDURE — 36415 COLL VENOUS BLD VENIPUNCTURE: CPT

## 2023-10-13 PROCEDURE — 96401 CHEMO ANTI-NEOPL SQ/IM: CPT

## 2023-10-13 PROCEDURE — 80053 COMPREHEN METABOLIC PANEL: CPT

## 2023-10-13 RX ORDER — LAMOTRIGINE 25 MG/1
500 TABLET ORAL ONCE
Status: COMPLETED | OUTPATIENT
Start: 2023-10-13 | End: 2023-10-13

## 2023-10-13 RX ADMIN — FULVESTRANT 500 MG: 50 INJECTION, SOLUTION INTRAMUSCULAR at 09:51

## 2023-11-10 ENCOUNTER — HOSPITAL ENCOUNTER (OUTPATIENT)
Dept: INFUSION THERAPY | Age: 68
Discharge: HOME | End: 2023-11-10
Payer: MEDICARE

## 2023-11-10 VITALS
HEART RATE: 78 BPM | SYSTOLIC BLOOD PRESSURE: 126 MMHG | WEIGHT: 184.6 LBS | BODY MASS INDEX: 32.7 KG/M2 | DIASTOLIC BLOOD PRESSURE: 84 MMHG

## 2023-11-10 DIAGNOSIS — C50.919 INFILTRATING DUCTAL CARCINOMA (HCC): Primary | ICD-10-CM

## 2023-11-10 LAB
ALBUMIN SERPL-MCNC: 3.8 G/DL (ref 3.5–5.2)
ALP SERPL-CCNC: 140 U/L (ref 35–104)
ALT SERPL-CCNC: 30 U/L (ref 9–52)
ANION GAP SERPL CALCULATED.3IONS-SCNC: 5 MMOL/L (ref 7–19)
AST SERPL-CCNC: 44 U/L (ref 14–36)
BASOPHILS # BLD: 0.03 K/UL (ref 0.01–0.08)
BASOPHILS NFR BLD: 0.6 % (ref 0.1–1.2)
BILIRUB SERPL-MCNC: 0.5 MG/DL (ref 0.2–1.3)
BUN SERPL-MCNC: 13 MG/DL (ref 7–17)
CALCIUM SERPL-MCNC: 9.5 MG/DL (ref 8.4–10.2)
CHLORIDE SERPL-SCNC: 104 MMOL/L (ref 98–111)
CO2 SERPL-SCNC: 31 MMOL/L (ref 22–29)
CREAT SERPL-MCNC: 0.7 MG/DL (ref 0.5–1)
EOSINOPHIL # BLD: 0.16 K/UL (ref 0.04–0.54)
EOSINOPHIL NFR BLD: 3.1 % (ref 0.7–7)
ERYTHROCYTE [DISTWIDTH] IN BLOOD BY AUTOMATED COUNT: 14.5 % (ref 11.7–14.4)
GLOBULIN: 3.4 G/DL
GLUCOSE SERPL-MCNC: 100 MG/DL (ref 74–106)
HCT VFR BLD AUTO: 32.6 % (ref 34.1–44.9)
HGB BLD-MCNC: 10.6 G/DL (ref 11.2–15.7)
LYMPHOCYTES # BLD: 1.42 K/UL (ref 1.18–3.74)
LYMPHOCYTES NFR BLD: 27.4 % (ref 19.3–53.1)
MCH RBC QN AUTO: 29.7 PG (ref 25.6–32.2)
MCHC RBC AUTO-ENTMCNC: 32.5 G/DL (ref 32.3–35.5)
MCV RBC AUTO: 91.3 FL (ref 79.4–94.8)
MONOCYTES # BLD: 0.41 K/UL (ref 0.24–0.82)
MONOCYTES NFR BLD: 7.9 % (ref 4.7–12.5)
NEUTROPHILS # BLD: 3.15 K/UL (ref 1.56–6.13)
NEUTS SEG NFR BLD: 60.8 % (ref 34–71.1)
PLATELET # BLD AUTO: 338 K/UL (ref 182–369)
PMV BLD AUTO: 9.2 FL (ref 7.4–10.4)
POTASSIUM SERPL-SCNC: 5 MMOL/L (ref 3.5–5.1)
PROT SERPL-MCNC: 7.2 G/DL (ref 6.3–8.2)
RBC # BLD AUTO: 3.57 M/UL (ref 3.93–5.22)
SODIUM SERPL-SCNC: 140 MMOL/L (ref 137–145)
WBC # BLD AUTO: 5.18 K/UL (ref 3.98–10.04)

## 2023-11-10 PROCEDURE — 96402 CHEMO HORMON ANTINEOPL SQ/IM: CPT

## 2023-11-10 PROCEDURE — 85025 COMPLETE CBC W/AUTO DIFF WBC: CPT

## 2023-11-10 PROCEDURE — 36415 COLL VENOUS BLD VENIPUNCTURE: CPT

## 2023-11-10 PROCEDURE — 80053 COMPREHEN METABOLIC PANEL: CPT

## 2023-11-10 PROCEDURE — 6360000002 HC RX W HCPCS: Performed by: INTERNAL MEDICINE

## 2023-11-10 PROCEDURE — 96401 CHEMO ANTI-NEOPL SQ/IM: CPT

## 2023-11-10 RX ORDER — LAMOTRIGINE 25 MG/1
500 TABLET ORAL ONCE
Status: COMPLETED | OUTPATIENT
Start: 2023-11-10 | End: 2023-11-10

## 2023-11-10 RX ADMIN — FULVESTRANT 500 MG: 50 INJECTION, SOLUTION INTRAMUSCULAR at 10:02

## 2023-12-07 ENCOUNTER — TELEPHONE (OUTPATIENT)
Dept: HEMATOLOGY | Age: 68
End: 2023-12-07

## 2023-12-07 DIAGNOSIS — C50.911 LOCAL RECURRENCE OF CANCER OF RIGHT BREAST (HCC): Primary | ICD-10-CM

## 2023-12-07 NOTE — TELEPHONE ENCOUNTER
Called patient and reminded patient of their appointment on 12/08/2023 and patient confirmed they would be here.

## 2023-12-08 ENCOUNTER — OFFICE VISIT (OUTPATIENT)
Dept: HEMATOLOGY | Age: 68
End: 2023-12-08
Payer: MEDICARE

## 2023-12-08 ENCOUNTER — HOSPITAL ENCOUNTER (OUTPATIENT)
Dept: INFUSION THERAPY | Age: 68
Discharge: HOME | End: 2023-12-08
Payer: MEDICARE

## 2023-12-08 VITALS
SYSTOLIC BLOOD PRESSURE: 142 MMHG | HEIGHT: 63 IN | TEMPERATURE: 97.9 F | HEART RATE: 86 BPM | DIASTOLIC BLOOD PRESSURE: 78 MMHG | WEIGHT: 186.1 LBS | OXYGEN SATURATION: 99 % | BODY MASS INDEX: 32.97 KG/M2

## 2023-12-08 DIAGNOSIS — D50.8 OTHER IRON DEFICIENCY ANEMIA: ICD-10-CM

## 2023-12-08 DIAGNOSIS — C50.911 LOCAL RECURRENCE OF CANCER OF RIGHT BREAST (HCC): ICD-10-CM

## 2023-12-08 DIAGNOSIS — Z12.11 SCREENING FOR COLON CANCER: ICD-10-CM

## 2023-12-08 DIAGNOSIS — C50.919 INFILTRATING DUCTAL CARCINOMA (HCC): Primary | ICD-10-CM

## 2023-12-08 DIAGNOSIS — Z71.89 CARE PLAN DISCUSSED WITH PATIENT: ICD-10-CM

## 2023-12-08 DIAGNOSIS — D64.9 NORMOCYTIC ANEMIA: ICD-10-CM

## 2023-12-08 DIAGNOSIS — Z79.818 USE OF FULVESTRANT (FASLODEX): ICD-10-CM

## 2023-12-08 LAB
ALBUMIN SERPL-MCNC: 4 G/DL (ref 3.5–5.2)
ALP SERPL-CCNC: 125 U/L (ref 35–104)
ALT SERPL-CCNC: 22 U/L (ref 9–52)
ANION GAP SERPL CALCULATED.3IONS-SCNC: 7 MMOL/L (ref 7–19)
AST SERPL-CCNC: 34 U/L (ref 14–36)
BASOPHILS # BLD: 0.03 K/UL (ref 0.01–0.08)
BASOPHILS NFR BLD: 0.6 % (ref 0.1–1.2)
BILIRUB SERPL-MCNC: 0.6 MG/DL (ref 0.2–1.3)
BUN SERPL-MCNC: 12 MG/DL (ref 7–17)
CALCIUM SERPL-MCNC: 8.8 MG/DL (ref 8.4–10.2)
CHLORIDE SERPL-SCNC: 107 MMOL/L (ref 98–111)
CO2 SERPL-SCNC: 27 MMOL/L (ref 22–29)
CREAT SERPL-MCNC: 0.6 MG/DL (ref 0.5–1)
EOSINOPHIL # BLD: 0.17 K/UL (ref 0.04–0.54)
EOSINOPHIL NFR BLD: 3.1 % (ref 0.7–7)
ERYTHROCYTE [DISTWIDTH] IN BLOOD BY AUTOMATED COUNT: 14.6 % (ref 11.7–14.4)
GLOBULIN: 3.2 G/DL
GLUCOSE SERPL-MCNC: 104 MG/DL (ref 74–106)
HCT VFR BLD AUTO: 32.2 % (ref 34.1–44.9)
HGB BLD-MCNC: 10.2 G/DL (ref 11.2–15.7)
LYMPHOCYTES # BLD: 1.31 K/UL (ref 1.18–3.74)
LYMPHOCYTES NFR BLD: 24.1 % (ref 19.3–53.1)
MCH RBC QN AUTO: 29.6 PG (ref 25.6–32.2)
MCHC RBC AUTO-ENTMCNC: 31.7 G/DL (ref 32.3–35.5)
MCV RBC AUTO: 93.3 FL (ref 79.4–94.8)
MONOCYTES # BLD: 0.38 K/UL (ref 0.24–0.82)
MONOCYTES NFR BLD: 7 % (ref 4.7–12.5)
NEUTROPHILS # BLD: 3.54 K/UL (ref 1.56–6.13)
NEUTS SEG NFR BLD: 65 % (ref 34–71.1)
PLATELET # BLD AUTO: 338 K/UL (ref 182–369)
PMV BLD AUTO: 9.2 FL (ref 7.4–10.4)
POTASSIUM SERPL-SCNC: 3.9 MMOL/L (ref 3.5–5.1)
PROT SERPL-MCNC: 7.2 G/DL (ref 6.3–8.2)
RBC # BLD AUTO: 3.45 M/UL (ref 3.93–5.22)
SODIUM SERPL-SCNC: 141 MMOL/L (ref 137–145)
WBC # BLD AUTO: 5.44 K/UL (ref 3.98–10.04)

## 2023-12-08 PROCEDURE — 1123F ACP DISCUSS/DSCN MKR DOCD: CPT | Performed by: INTERNAL MEDICINE

## 2023-12-08 PROCEDURE — G8484 FLU IMMUNIZE NO ADMIN: HCPCS | Performed by: INTERNAL MEDICINE

## 2023-12-08 PROCEDURE — 1036F TOBACCO NON-USER: CPT | Performed by: INTERNAL MEDICINE

## 2023-12-08 PROCEDURE — 99214 OFFICE O/P EST MOD 30 MIN: CPT | Performed by: INTERNAL MEDICINE

## 2023-12-08 PROCEDURE — G8427 DOCREV CUR MEDS BY ELIG CLIN: HCPCS | Performed by: INTERNAL MEDICINE

## 2023-12-08 PROCEDURE — G8417 CALC BMI ABV UP PARAM F/U: HCPCS | Performed by: INTERNAL MEDICINE

## 2023-12-08 PROCEDURE — 85025 COMPLETE CBC W/AUTO DIFF WBC: CPT

## 2023-12-08 PROCEDURE — 6360000002 HC RX W HCPCS

## 2023-12-08 PROCEDURE — 36415 COLL VENOUS BLD VENIPUNCTURE: CPT

## 2023-12-08 PROCEDURE — 1090F PRES/ABSN URINE INCON ASSESS: CPT | Performed by: INTERNAL MEDICINE

## 2023-12-08 PROCEDURE — 3017F COLORECTAL CA SCREEN DOC REV: CPT | Performed by: INTERNAL MEDICINE

## 2023-12-08 PROCEDURE — G8400 PT W/DXA NO RESULTS DOC: HCPCS | Performed by: INTERNAL MEDICINE

## 2023-12-08 PROCEDURE — 99212 OFFICE O/P EST SF 10 MIN: CPT

## 2023-12-08 PROCEDURE — 96372 THER/PROPH/DIAG INJ SC/IM: CPT

## 2023-12-08 PROCEDURE — 80053 COMPREHEN METABOLIC PANEL: CPT

## 2023-12-08 PROCEDURE — 96402 CHEMO HORMON ANTINEOPL SQ/IM: CPT

## 2023-12-08 RX ORDER — LAMOTRIGINE 25 MG/1
500 TABLET ORAL ONCE
Status: COMPLETED | OUTPATIENT
Start: 2023-12-08 | End: 2023-12-08

## 2023-12-08 RX ADMIN — FULVESTRANT 500 MG: 50 INJECTION, SOLUTION INTRAMUSCULAR at 10:07

## 2024-01-05 ENCOUNTER — HOSPITAL ENCOUNTER (OUTPATIENT)
Dept: INFUSION THERAPY | Age: 69
Discharge: HOME | End: 2024-01-05
Payer: MEDICARE

## 2024-01-05 VITALS
TEMPERATURE: 97.5 F | BODY MASS INDEX: 33.69 KG/M2 | WEIGHT: 190.2 LBS | OXYGEN SATURATION: 98 % | DIASTOLIC BLOOD PRESSURE: 100 MMHG | SYSTOLIC BLOOD PRESSURE: 182 MMHG | HEART RATE: 82 BPM

## 2024-01-05 DIAGNOSIS — D50.8 OTHER IRON DEFICIENCY ANEMIA: ICD-10-CM

## 2024-01-05 DIAGNOSIS — D50.8 IRON DEFICIENCY ANEMIA SECONDARY TO INADEQUATE DIETARY IRON INTAKE: Primary | ICD-10-CM

## 2024-01-05 DIAGNOSIS — C50.919 INFILTRATING DUCTAL CARCINOMA (HCC): Primary | ICD-10-CM

## 2024-01-05 LAB
ALBUMIN SERPL-MCNC: 4.1 G/DL (ref 3.5–5.2)
ALP SERPL-CCNC: 141 U/L (ref 35–104)
ALT SERPL-CCNC: 41 U/L (ref 9–52)
ANION GAP SERPL CALCULATED.3IONS-SCNC: 7 MMOL/L (ref 7–19)
AST SERPL-CCNC: 43 U/L (ref 14–36)
BASOPHILS # BLD: 0.03 K/UL (ref 0.01–0.08)
BASOPHILS NFR BLD: 0.8 % (ref 0.1–1.2)
BILIRUB SERPL-MCNC: 0.5 MG/DL (ref 0.2–1.3)
BUN SERPL-MCNC: 16 MG/DL (ref 7–17)
CALCIUM SERPL-MCNC: 9.3 MG/DL (ref 8.4–10.2)
CHLORIDE SERPL-SCNC: 104 MMOL/L (ref 98–111)
CO2 SERPL-SCNC: 29 MMOL/L (ref 22–29)
CREAT SERPL-MCNC: 0.5 MG/DL (ref 0.5–1)
EOSINOPHIL # BLD: 0.14 K/UL (ref 0.04–0.54)
EOSINOPHIL NFR BLD: 3.6 % (ref 0.7–7)
ERYTHROCYTE [DISTWIDTH] IN BLOOD BY AUTOMATED COUNT: 14.6 % (ref 11.7–14.4)
FERRITIN SERPL-MCNC: 11.2 NG/ML (ref 13–150)
FOLATE SERPL-MCNC: >20 NG/ML (ref 4.8–37.3)
GLOBULIN: 3.3 G/DL
GLUCOSE SERPL-MCNC: 98 MG/DL (ref 74–106)
HCT VFR BLD AUTO: 32.2 % (ref 34.1–44.9)
HCT VFR BLD AUTO: 32.2 % (ref 34.1–44.9)
HEMOCCULT SP1 STL QL: NORMAL
HEMOCCULT SP2 STL QL: NORMAL
HEMOCCULT SP3 STL QL: NORMAL
HGB BLD-MCNC: 10.2 G/DL (ref 11.2–15.7)
IRON SATN MFR SERPL: 9 % (ref 14–50)
IRON SERPL-MCNC: 37 UG/DL (ref 37–145)
LYMPHOCYTES # BLD: 1.3 K/UL (ref 1.18–3.74)
LYMPHOCYTES NFR BLD: 33.2 % (ref 19.3–53.1)
MCH RBC QN AUTO: 29.2 PG (ref 25.6–32.2)
MCHC RBC AUTO-ENTMCNC: 31.7 G/DL (ref 32.3–35.5)
MCV RBC AUTO: 92.3 FL (ref 79.4–94.8)
MONOCYTES # BLD: 0.27 K/UL (ref 0.24–0.82)
MONOCYTES NFR BLD: 6.9 % (ref 4.7–12.5)
NEUTROPHILS # BLD: 2.16 K/UL (ref 1.56–6.13)
NEUTS SEG NFR BLD: 55.2 % (ref 34–71.1)
PLATELET # BLD AUTO: 376 K/UL (ref 182–369)
PMV BLD AUTO: 9.1 FL (ref 7.4–10.4)
POTASSIUM SERPL-SCNC: 4.2 MMOL/L (ref 3.5–5.1)
PROT SERPL-MCNC: 7.4 G/DL (ref 6.3–8.2)
RBC # BLD AUTO: 3.49 M/UL (ref 3.93–5.22)
RETICS # AUTO: 0.06 M/UL (ref 0.03–0.12)
RETICS/RBC NFR: 1.85 % (ref 0.5–1.5)
SODIUM SERPL-SCNC: 140 MMOL/L (ref 137–145)
TIBC SERPL-MCNC: 411 UG/DL (ref 250–400)
VIT B12 SERPL-MCNC: 1495 PG/ML (ref 211–946)
WBC # BLD AUTO: 3.91 K/UL (ref 3.98–10.04)

## 2024-01-05 PROCEDURE — 80053 COMPREHEN METABOLIC PANEL: CPT

## 2024-01-05 PROCEDURE — 6360000002 HC RX W HCPCS: Performed by: INTERNAL MEDICINE

## 2024-01-05 PROCEDURE — 36415 COLL VENOUS BLD VENIPUNCTURE: CPT

## 2024-01-05 PROCEDURE — 85025 COMPLETE CBC W/AUTO DIFF WBC: CPT

## 2024-01-05 PROCEDURE — 96402 CHEMO HORMON ANTINEOPL SQ/IM: CPT

## 2024-01-05 PROCEDURE — 85045 AUTOMATED RETICULOCYTE COUNT: CPT

## 2024-01-05 RX ORDER — LAMOTRIGINE 25 MG/1
500 TABLET ORAL ONCE
Status: COMPLETED | OUTPATIENT
Start: 2024-01-05 | End: 2024-01-05

## 2024-01-05 RX ADMIN — FULVESTRANT 500 MG: 50 INJECTION, SOLUTION INTRAMUSCULAR at 10:11

## 2024-01-05 NOTE — PROGRESS NOTES
Patient presents to clinic today for labs and faslodex injection. On arrival, patients blood pressure is 180/108. Patient denies having any chest pain or shortness of breath. She states she does not take any blood pressure medications. Dr. Brunson is made aware. Per Dr. Brunson- ok to proceed with faslodex injection and patient should see PCP today. Contacted [facility] office and appointment made for patient to see Lana today at 2:30. Patient is agreeable to plan, given appointment information, and verbalized understanding.

## 2024-01-31 DIAGNOSIS — Z79.818 USE OF FULVESTRANT (FASLODEX): ICD-10-CM

## 2024-01-31 DIAGNOSIS — C50.919 INFILTRATING DUCTAL CARCINOMA (HCC): Primary | ICD-10-CM

## 2024-02-01 ENCOUNTER — TELEPHONE (OUTPATIENT)
Dept: HEMATOLOGY | Age: 69
End: 2024-02-01

## 2024-02-01 NOTE — TELEPHONE ENCOUNTER
Called patient and reminded patient of their appointment on 2/2/2024and patient confirmed they would be here.

## 2024-02-02 ENCOUNTER — HOSPITAL ENCOUNTER (OUTPATIENT)
Dept: INFUSION THERAPY | Age: 69
Discharge: HOME | End: 2024-02-02
Payer: MEDICARE

## 2024-02-02 ENCOUNTER — HOSPITAL ENCOUNTER (OUTPATIENT)
Dept: INFUSION THERAPY | Age: 69
Setting detail: INFUSION SERIES
Discharge: HOME | End: 2024-02-02
Payer: MEDICARE

## 2024-02-02 ENCOUNTER — OFFICE VISIT (OUTPATIENT)
Dept: HEMATOLOGY | Age: 69
End: 2024-02-02
Payer: MEDICARE

## 2024-02-02 VITALS
SYSTOLIC BLOOD PRESSURE: 132 MMHG | DIASTOLIC BLOOD PRESSURE: 90 MMHG | WEIGHT: 184 LBS | TEMPERATURE: 98.8 F | HEART RATE: 81 BPM | HEIGHT: 63 IN | OXYGEN SATURATION: 99 % | BODY MASS INDEX: 32.6 KG/M2

## 2024-02-02 VITALS
TEMPERATURE: 97.7 F | OXYGEN SATURATION: 96 % | DIASTOLIC BLOOD PRESSURE: 71 MMHG | HEART RATE: 78 BPM | RESPIRATION RATE: 18 BRPM | SYSTOLIC BLOOD PRESSURE: 132 MMHG

## 2024-02-02 DIAGNOSIS — D50.8 OTHER IRON DEFICIENCY ANEMIA: ICD-10-CM

## 2024-02-02 DIAGNOSIS — Z79.818 USE OF FULVESTRANT (FASLODEX): ICD-10-CM

## 2024-02-02 DIAGNOSIS — D50.8 IRON DEFICIENCY ANEMIA SECONDARY TO INADEQUATE DIETARY IRON INTAKE: Primary | ICD-10-CM

## 2024-02-02 DIAGNOSIS — Z71.89 CARE PLAN DISCUSSED WITH PATIENT: ICD-10-CM

## 2024-02-02 DIAGNOSIS — C50.919 INFILTRATING DUCTAL CARCINOMA (HCC): Primary | ICD-10-CM

## 2024-02-02 DIAGNOSIS — E61.1 IRON DEFICIENCY: ICD-10-CM

## 2024-02-02 LAB
ALBUMIN SERPL-MCNC: 4.4 G/DL (ref 3.5–5.2)
ALP SERPL-CCNC: 154 U/L (ref 35–104)
ALT SERPL-CCNC: 27 U/L (ref 9–52)
ANION GAP SERPL CALCULATED.3IONS-SCNC: 11 MMOL/L (ref 7–19)
AST SERPL-CCNC: 43 U/L (ref 14–36)
BASOPHILS # BLD: 0.03 K/UL (ref 0.01–0.08)
BASOPHILS NFR BLD: 0.7 % (ref 0.1–1.2)
BILIRUB SERPL-MCNC: 0.5 MG/DL (ref 0.2–1.3)
BUN SERPL-MCNC: 11 MG/DL (ref 7–17)
CALCIUM SERPL-MCNC: 9 MG/DL (ref 8.4–10.2)
CHLORIDE SERPL-SCNC: 96 MMOL/L (ref 98–111)
CO2 SERPL-SCNC: 28 MMOL/L (ref 22–29)
CREAT SERPL-MCNC: 0.7 MG/DL (ref 0.5–1)
EOSINOPHIL # BLD: 0.09 K/UL (ref 0.04–0.54)
EOSINOPHIL NFR BLD: 2 % (ref 0.7–7)
ERYTHROCYTE [DISTWIDTH] IN BLOOD BY AUTOMATED COUNT: 13.8 % (ref 11.7–14.4)
GLOBULIN: 3.7 G/DL
GLUCOSE SERPL-MCNC: 109 MG/DL (ref 74–106)
HCT VFR BLD AUTO: 33 % (ref 34.1–44.9)
HGB BLD-MCNC: 10.9 G/DL (ref 11.2–15.7)
LYMPHOCYTES # BLD: 1.36 K/UL (ref 1.18–3.74)
LYMPHOCYTES NFR BLD: 30.7 % (ref 19.3–53.1)
MCH RBC QN AUTO: 29.1 PG (ref 25.6–32.2)
MCHC RBC AUTO-ENTMCNC: 33 G/DL (ref 32.3–35.5)
MCV RBC AUTO: 88.2 FL (ref 79.4–94.8)
MONOCYTES # BLD: 0.34 K/UL (ref 0.24–0.82)
MONOCYTES NFR BLD: 7.7 % (ref 4.7–12.5)
NEUTROPHILS # BLD: 2.6 K/UL (ref 1.56–6.13)
NEUTS SEG NFR BLD: 58.7 % (ref 34–71.1)
PLATELET # BLD AUTO: 395 K/UL (ref 182–369)
PMV BLD AUTO: 8.8 FL (ref 7.4–10.4)
POTASSIUM SERPL-SCNC: 4.7 MMOL/L (ref 3.5–5.1)
PROT SERPL-MCNC: 8.1 G/DL (ref 6.3–8.2)
RBC # BLD AUTO: 3.74 M/UL (ref 3.93–5.22)
SODIUM SERPL-SCNC: 135 MMOL/L (ref 137–145)
WBC # BLD AUTO: 4.43 K/UL (ref 3.98–10.04)

## 2024-02-02 PROCEDURE — 96372 THER/PROPH/DIAG INJ SC/IM: CPT

## 2024-02-02 PROCEDURE — 1036F TOBACCO NON-USER: CPT | Performed by: INTERNAL MEDICINE

## 2024-02-02 PROCEDURE — 6360000002 HC RX W HCPCS

## 2024-02-02 PROCEDURE — 96365 THER/PROPH/DIAG IV INF INIT: CPT

## 2024-02-02 PROCEDURE — G8417 CALC BMI ABV UP PARAM F/U: HCPCS | Performed by: INTERNAL MEDICINE

## 2024-02-02 PROCEDURE — 2580000003 HC RX 258: Performed by: INTERNAL MEDICINE

## 2024-02-02 PROCEDURE — 1090F PRES/ABSN URINE INCON ASSESS: CPT | Performed by: INTERNAL MEDICINE

## 2024-02-02 PROCEDURE — 1123F ACP DISCUSS/DSCN MKR DOCD: CPT | Performed by: INTERNAL MEDICINE

## 2024-02-02 PROCEDURE — G2211 COMPLEX E/M VISIT ADD ON: HCPCS | Performed by: INTERNAL MEDICINE

## 2024-02-02 PROCEDURE — 36415 COLL VENOUS BLD VENIPUNCTURE: CPT

## 2024-02-02 PROCEDURE — 85025 COMPLETE CBC W/AUTO DIFF WBC: CPT

## 2024-02-02 PROCEDURE — 99214 OFFICE O/P EST MOD 30 MIN: CPT | Performed by: INTERNAL MEDICINE

## 2024-02-02 PROCEDURE — 96366 THER/PROPH/DIAG IV INF ADDON: CPT

## 2024-02-02 PROCEDURE — 6360000002 HC RX W HCPCS: Performed by: INTERNAL MEDICINE

## 2024-02-02 PROCEDURE — G8400 PT W/DXA NO RESULTS DOC: HCPCS | Performed by: INTERNAL MEDICINE

## 2024-02-02 PROCEDURE — G8427 DOCREV CUR MEDS BY ELIG CLIN: HCPCS | Performed by: INTERNAL MEDICINE

## 2024-02-02 PROCEDURE — G8484 FLU IMMUNIZE NO ADMIN: HCPCS | Performed by: INTERNAL MEDICINE

## 2024-02-02 PROCEDURE — 3017F COLORECTAL CA SCREEN DOC REV: CPT | Performed by: INTERNAL MEDICINE

## 2024-02-02 PROCEDURE — 80053 COMPREHEN METABOLIC PANEL: CPT

## 2024-02-02 PROCEDURE — 96402 CHEMO HORMON ANTINEOPL SQ/IM: CPT

## 2024-02-02 RX ORDER — HYDROCHLOROTHIAZIDE 12.5 MG/1
CAPSULE, GELATIN COATED ORAL DAILY
COMMUNITY
Start: 2024-01-18

## 2024-02-02 RX ORDER — SODIUM CHLORIDE 0.9 % (FLUSH) 0.9 %
5-40 SYRINGE (ML) INJECTION PRN
OUTPATIENT
Start: 2024-02-16

## 2024-02-02 RX ORDER — SODIUM CHLORIDE 9 MG/ML
5-250 INJECTION, SOLUTION INTRAVENOUS PRN
Status: CANCELLED | OUTPATIENT
Start: 2024-02-02

## 2024-02-02 RX ORDER — ONDANSETRON 2 MG/ML
8 INJECTION INTRAMUSCULAR; INTRAVENOUS
OUTPATIENT
Start: 2024-02-16

## 2024-02-02 RX ORDER — HEPARIN 100 UNIT/ML
500 SYRINGE INTRAVENOUS PRN
Status: DISCONTINUED | OUTPATIENT
Start: 2024-02-02 | End: 2024-02-03 | Stop reason: HOSPADM

## 2024-02-02 RX ORDER — HEPARIN 100 UNIT/ML
500 SYRINGE INTRAVENOUS PRN
OUTPATIENT
Start: 2024-02-16

## 2024-02-02 RX ORDER — SODIUM CHLORIDE 9 MG/ML
INJECTION, SOLUTION INTRAVENOUS CONTINUOUS
OUTPATIENT
Start: 2024-02-16

## 2024-02-02 RX ORDER — ACETAMINOPHEN 325 MG/1
650 TABLET ORAL
OUTPATIENT
Start: 2024-02-02

## 2024-02-02 RX ORDER — SODIUM CHLORIDE 9 MG/ML
5-250 INJECTION, SOLUTION INTRAVENOUS PRN
Status: DISCONTINUED | OUTPATIENT
Start: 2024-02-02 | End: 2024-02-03 | Stop reason: HOSPADM

## 2024-02-02 RX ORDER — SODIUM CHLORIDE 9 MG/ML
INJECTION, SOLUTION INTRAVENOUS CONTINUOUS
OUTPATIENT
Start: 2024-02-02

## 2024-02-02 RX ORDER — ONDANSETRON 2 MG/ML
8 INJECTION INTRAMUSCULAR; INTRAVENOUS
OUTPATIENT
Start: 2024-02-02

## 2024-02-02 RX ORDER — DIPHENHYDRAMINE HYDROCHLORIDE 50 MG/ML
50 INJECTION INTRAMUSCULAR; INTRAVENOUS
OUTPATIENT
Start: 2024-02-16

## 2024-02-02 RX ORDER — SODIUM CHLORIDE 0.9 % (FLUSH) 0.9 %
5-40 SYRINGE (ML) INJECTION PRN
Status: CANCELLED | OUTPATIENT
Start: 2024-02-02

## 2024-02-02 RX ORDER — HEPARIN 100 UNIT/ML
500 SYRINGE INTRAVENOUS PRN
Status: CANCELLED | OUTPATIENT
Start: 2024-02-02

## 2024-02-02 RX ORDER — ACETAMINOPHEN 325 MG/1
650 TABLET ORAL
OUTPATIENT
Start: 2024-02-16

## 2024-02-02 RX ORDER — EPINEPHRINE 1 MG/ML
0.3 INJECTION, SOLUTION, CONCENTRATE INTRAVENOUS PRN
OUTPATIENT
Start: 2024-02-02

## 2024-02-02 RX ORDER — LOSARTAN POTASSIUM 50 MG/1
50 TABLET ORAL DAILY
COMMUNITY
Start: 2024-01-18

## 2024-02-02 RX ORDER — LAMOTRIGINE 25 MG/1
500 TABLET ORAL ONCE
Status: COMPLETED | OUTPATIENT
Start: 2024-02-02 | End: 2024-02-02

## 2024-02-02 RX ORDER — SODIUM CHLORIDE 9 MG/ML
5-250 INJECTION, SOLUTION INTRAVENOUS PRN
OUTPATIENT
Start: 2024-02-02

## 2024-02-02 RX ORDER — ALBUTEROL SULFATE 90 UG/1
4 AEROSOL, METERED RESPIRATORY (INHALATION) PRN
OUTPATIENT
Start: 2024-02-16

## 2024-02-02 RX ORDER — SODIUM CHLORIDE 0.9 % (FLUSH) 0.9 %
5-40 SYRINGE (ML) INJECTION PRN
Status: DISCONTINUED | OUTPATIENT
Start: 2024-02-02 | End: 2024-02-03 | Stop reason: HOSPADM

## 2024-02-02 RX ORDER — DIPHENHYDRAMINE HYDROCHLORIDE 50 MG/ML
50 INJECTION INTRAMUSCULAR; INTRAVENOUS
OUTPATIENT
Start: 2024-02-02

## 2024-02-02 RX ORDER — ALBUTEROL SULFATE 90 UG/1
4 AEROSOL, METERED RESPIRATORY (INHALATION) PRN
OUTPATIENT
Start: 2024-02-02

## 2024-02-02 RX ORDER — EPINEPHRINE 1 MG/ML
0.3 INJECTION, SOLUTION, CONCENTRATE INTRAVENOUS PRN
OUTPATIENT
Start: 2024-02-16

## 2024-02-02 RX ORDER — SODIUM CHLORIDE 9 MG/ML
5-250 INJECTION, SOLUTION INTRAVENOUS PRN
OUTPATIENT
Start: 2024-02-16

## 2024-02-02 RX ADMIN — SODIUM CHLORIDE 20 ML/HR: 9 INJECTION, SOLUTION INTRAVENOUS at 10:53

## 2024-02-02 RX ADMIN — FULVESTRANT 500 MG: 50 INJECTION, SOLUTION INTRAMUSCULAR at 09:54

## 2024-02-02 RX ADMIN — SODIUM CHLORIDE 300 MG: 9 INJECTION, SOLUTION INTRAVENOUS at 10:54

## 2024-02-02 NOTE — PROGRESS NOTES
Venofer 300 mg infused as ordered.  Pt monitored for 30 min post infusion.  Tolerated well.  Appointments reviewed with pt and pt states understanding.

## 2024-02-02 NOTE — DISCHARGE INSTRUCTIONS
iron sucrose (injection)  Pronunciation:  EYE urn AIYANA hyman  Brand:  Venofer  What is the most important information I should know about iron sucrose?  Follow all directions on your medicine label and package. Tell each of your healthcare providers about all your medical conditions, allergies, and all medicines you use.  What is iron sucrose?  Iron sucrose is used to treat iron deficiency anemia in people with kidney disease.  Iron sucrose is for use in adults and children at least 2 years old.  Iron sucrose is not for treating other forms of anemia not caused by iron deficiency.   Iron sucrose may also be used for purposes not listed in this medication guide.  What should I discuss with my healthcare provider before I receive iron sucrose?  You should not be treated with this medicine if you have ever had an allergic reaction to an iron injection.  Tell your doctor if you have ever had:  hemochromatosis or iron overload (the buildup of excess iron).  Tell your doctor if you are pregnant or plan to become pregnant. Iron sucrose can harm an unborn baby if you have a severe reaction to this medicine during your second or third trimester. However, not treating iron deficiency anemia during pregnancy may cause complications such as premature birth or low birth weight. The benefit of treating your condition during pregnancy may outweigh any risks.  If you are breastfeeding, tell your doctor if you notice diarrhea or constipation in the nursing baby.  How is iron sucrose given?  Iron sucrose is given as an infusion into a vein. A healthcare provider will give you this injection.  This medicine is sometimes given slowly, and the infusion can take up to 2.5 hours to complete.  Tell your caregivers if you feel any burning, pain, or swelling around the IV needle when iron sucrose is injected.  You will be watched closely for at least 30 minutes to make sure you do not have an allergic reaction.  You will need frequent

## 2024-02-09 ENCOUNTER — HOSPITAL ENCOUNTER (OUTPATIENT)
Dept: INFUSION THERAPY | Age: 69
Setting detail: INFUSION SERIES
Discharge: HOME | End: 2024-02-09
Payer: MEDICARE

## 2024-02-09 VITALS
DIASTOLIC BLOOD PRESSURE: 64 MMHG | OXYGEN SATURATION: 97 % | SYSTOLIC BLOOD PRESSURE: 148 MMHG | HEART RATE: 60 BPM | TEMPERATURE: 98.1 F | RESPIRATION RATE: 18 BRPM

## 2024-02-09 DIAGNOSIS — D50.8 IRON DEFICIENCY ANEMIA SECONDARY TO INADEQUATE DIETARY IRON INTAKE: Primary | ICD-10-CM

## 2024-02-09 PROCEDURE — 96365 THER/PROPH/DIAG IV INF INIT: CPT

## 2024-02-09 PROCEDURE — 2580000003 HC RX 258: Performed by: INTERNAL MEDICINE

## 2024-02-09 PROCEDURE — 6360000002 HC RX W HCPCS: Performed by: INTERNAL MEDICINE

## 2024-02-09 PROCEDURE — 96366 THER/PROPH/DIAG IV INF ADDON: CPT

## 2024-02-09 RX ORDER — SODIUM CHLORIDE 0.9 % (FLUSH) 0.9 %
5-40 SYRINGE (ML) INJECTION PRN
Status: DISCONTINUED | OUTPATIENT
Start: 2024-02-09 | End: 2024-02-10 | Stop reason: HOSPADM

## 2024-02-09 RX ORDER — SODIUM CHLORIDE 0.9 % (FLUSH) 0.9 %
5-40 SYRINGE (ML) INJECTION PRN
OUTPATIENT
Start: 2024-02-16

## 2024-02-09 RX ORDER — SODIUM CHLORIDE 9 MG/ML
5-250 INJECTION, SOLUTION INTRAVENOUS PRN
OUTPATIENT
Start: 2024-02-16

## 2024-02-09 RX ORDER — EPINEPHRINE 1 MG/ML
0.3 INJECTION, SOLUTION, CONCENTRATE INTRAVENOUS PRN
OUTPATIENT
Start: 2024-02-16

## 2024-02-09 RX ORDER — ALBUTEROL SULFATE 90 UG/1
4 AEROSOL, METERED RESPIRATORY (INHALATION) PRN
OUTPATIENT
Start: 2024-02-16

## 2024-02-09 RX ORDER — ONDANSETRON 2 MG/ML
8 INJECTION INTRAMUSCULAR; INTRAVENOUS
OUTPATIENT
Start: 2024-02-16

## 2024-02-09 RX ORDER — HEPARIN 100 UNIT/ML
500 SYRINGE INTRAVENOUS PRN
OUTPATIENT
Start: 2024-02-16

## 2024-02-09 RX ORDER — SODIUM CHLORIDE 9 MG/ML
5-250 INJECTION, SOLUTION INTRAVENOUS PRN
Status: DISCONTINUED | OUTPATIENT
Start: 2024-02-09 | End: 2024-02-10 | Stop reason: HOSPADM

## 2024-02-09 RX ORDER — SODIUM CHLORIDE 9 MG/ML
INJECTION, SOLUTION INTRAVENOUS CONTINUOUS
OUTPATIENT
Start: 2024-02-16

## 2024-02-09 RX ORDER — ACETAMINOPHEN 325 MG/1
650 TABLET ORAL
OUTPATIENT
Start: 2024-02-16

## 2024-02-09 RX ORDER — DIPHENHYDRAMINE HYDROCHLORIDE 50 MG/ML
50 INJECTION INTRAMUSCULAR; INTRAVENOUS
OUTPATIENT
Start: 2024-02-16

## 2024-02-09 RX ORDER — SODIUM CHLORIDE 9 MG/ML
5-250 INJECTION, SOLUTION INTRAVENOUS PRN
Status: CANCELLED | OUTPATIENT
Start: 2024-02-16

## 2024-02-09 RX ADMIN — SODIUM CHLORIDE 20 ML/HR: 9 INJECTION, SOLUTION INTRAVENOUS at 09:47

## 2024-02-09 RX ADMIN — SODIUM CHLORIDE 300 MG: 9 INJECTION, SOLUTION INTRAVENOUS at 09:47

## 2024-02-09 NOTE — PROGRESS NOTES
Venofer 300mg administered. Pt monitored for 30 minutes infusion. Pt tolerated well.    Electronically signed by Jania Laughlin RN on 2/9/2024 at 12:01 PM

## 2024-02-12 ENCOUNTER — HOSPITAL ENCOUNTER (OUTPATIENT)
Dept: INFUSION THERAPY | Age: 69
Setting detail: INFUSION SERIES
Discharge: HOME | End: 2024-02-12
Payer: MEDICARE

## 2024-02-12 VITALS
HEART RATE: 76 BPM | SYSTOLIC BLOOD PRESSURE: 132 MMHG | DIASTOLIC BLOOD PRESSURE: 68 MMHG | TEMPERATURE: 97.8 F | OXYGEN SATURATION: 99 % | RESPIRATION RATE: 18 BRPM

## 2024-02-12 DIAGNOSIS — D50.8 IRON DEFICIENCY ANEMIA SECONDARY TO INADEQUATE DIETARY IRON INTAKE: Primary | ICD-10-CM

## 2024-02-12 PROCEDURE — 6360000002 HC RX W HCPCS: Performed by: INTERNAL MEDICINE

## 2024-02-12 PROCEDURE — 96366 THER/PROPH/DIAG IV INF ADDON: CPT

## 2024-02-12 PROCEDURE — 2580000003 HC RX 258: Performed by: INTERNAL MEDICINE

## 2024-02-12 PROCEDURE — 96365 THER/PROPH/DIAG IV INF INIT: CPT

## 2024-02-12 RX ORDER — ALBUTEROL SULFATE 90 UG/1
4 AEROSOL, METERED RESPIRATORY (INHALATION) PRN
OUTPATIENT
Start: 2024-03-01

## 2024-02-12 RX ORDER — HEPARIN 100 UNIT/ML
500 SYRINGE INTRAVENOUS PRN
OUTPATIENT
Start: 2024-03-01

## 2024-02-12 RX ORDER — SODIUM CHLORIDE 9 MG/ML
5-250 INJECTION, SOLUTION INTRAVENOUS PRN
OUTPATIENT
Start: 2024-03-01

## 2024-02-12 RX ORDER — SODIUM CHLORIDE 9 MG/ML
INJECTION, SOLUTION INTRAVENOUS CONTINUOUS
OUTPATIENT
Start: 2024-03-01

## 2024-02-12 RX ORDER — ONDANSETRON 2 MG/ML
8 INJECTION INTRAMUSCULAR; INTRAVENOUS
OUTPATIENT
Start: 2024-03-01

## 2024-02-12 RX ORDER — DIPHENHYDRAMINE HYDROCHLORIDE 50 MG/ML
50 INJECTION INTRAMUSCULAR; INTRAVENOUS
OUTPATIENT
Start: 2024-03-01

## 2024-02-12 RX ORDER — SODIUM CHLORIDE 9 MG/ML
5-250 INJECTION, SOLUTION INTRAVENOUS PRN
Status: DISCONTINUED | OUTPATIENT
Start: 2024-02-12 | End: 2024-02-13 | Stop reason: HOSPADM

## 2024-02-12 RX ORDER — EPINEPHRINE 1 MG/ML
0.3 INJECTION, SOLUTION, CONCENTRATE INTRAVENOUS PRN
OUTPATIENT
Start: 2024-03-01

## 2024-02-12 RX ORDER — ACETAMINOPHEN 325 MG/1
650 TABLET ORAL
OUTPATIENT
Start: 2024-03-01

## 2024-02-12 RX ORDER — SODIUM CHLORIDE 0.9 % (FLUSH) 0.9 %
5-40 SYRINGE (ML) INJECTION PRN
OUTPATIENT
Start: 2024-03-01

## 2024-02-12 RX ADMIN — SODIUM CHLORIDE 20 ML/HR: 9 INJECTION, SOLUTION INTRAVENOUS at 09:57

## 2024-02-12 RX ADMIN — SODIUM CHLORIDE 400 MG: 9 INJECTION, SOLUTION INTRAVENOUS at 09:57

## 2024-02-12 NOTE — PROGRESS NOTES
PIV placed in L AC with brisk blood return. Venofer 400mg started. During infusion, PIV infiltrated. Pt stated that the site was painful. Edema around site noted. Infusion stopped and IV removed. New PIV placed in R AC. Pt tolerated the rest of the infusion well. Pt monitored for 30 minutes post infusion.     Electronically signed by Jania Laughlin RN on 2/12/2024 at 1:29 PM

## 2024-02-15 ENCOUNTER — OFFICE VISIT (OUTPATIENT)
Dept: CARDIOLOGY | Facility: CLINIC | Age: 69
End: 2024-02-15
Payer: MEDICARE

## 2024-02-15 VITALS
HEART RATE: 67 BPM | DIASTOLIC BLOOD PRESSURE: 75 MMHG | SYSTOLIC BLOOD PRESSURE: 113 MMHG | HEIGHT: 63 IN | BODY MASS INDEX: 32.96 KG/M2 | WEIGHT: 186 LBS

## 2024-02-15 DIAGNOSIS — Z98.890 S/P LYMPH NODE BIOPSY: ICD-10-CM

## 2024-02-15 DIAGNOSIS — Z92.3 HISTORY OF RADIATION THERAPY: ICD-10-CM

## 2024-02-15 DIAGNOSIS — I10 PRIMARY HYPERTENSION: Primary | ICD-10-CM

## 2024-02-15 DIAGNOSIS — I10 UNCONTROLLED HYPERTENSION: ICD-10-CM

## 2024-02-15 DIAGNOSIS — I51.7 LVH (LEFT VENTRICULAR HYPERTROPHY): ICD-10-CM

## 2024-02-15 DIAGNOSIS — Z90.13 S/P BILATERAL MASTECTOMY: ICD-10-CM

## 2024-02-15 DIAGNOSIS — R42 DIZZY SPELLS: ICD-10-CM

## 2024-02-15 DIAGNOSIS — Z17.0 MALIGNANT NEOPLASM OF UPPER-OUTER QUADRANT OF RIGHT BREAST IN FEMALE, ESTROGEN RECEPTOR POSITIVE: ICD-10-CM

## 2024-02-15 DIAGNOSIS — R06.09 DOE (DYSPNEA ON EXERTION): ICD-10-CM

## 2024-02-15 DIAGNOSIS — C50.411 MALIGNANT NEOPLASM OF UPPER-OUTER QUADRANT OF RIGHT BREAST IN FEMALE, ESTROGEN RECEPTOR POSITIVE: ICD-10-CM

## 2024-02-15 RX ORDER — LOSARTAN POTASSIUM 50 MG/1
1 TABLET ORAL DAILY
COMMUNITY
Start: 2023-12-15

## 2024-02-15 RX ORDER — DOXEPIN HYDROCHLORIDE 25 MG/1
1 CAPSULE ORAL DAILY
COMMUNITY
Start: 2023-11-28

## 2024-02-15 RX ORDER — HYDROCHLOROTHIAZIDE 12.5 MG/1
CAPSULE, GELATIN COATED ORAL DAILY
COMMUNITY
Start: 2024-01-18

## 2024-02-15 RX ORDER — OMEPRAZOLE 20 MG/1
1 CAPSULE, DELAYED RELEASE ORAL DAILY
COMMUNITY
Start: 2024-01-30

## 2024-03-01 ENCOUNTER — HOSPITAL ENCOUNTER (OUTPATIENT)
Dept: ULTRASOUND IMAGING | Facility: HOSPITAL | Age: 69
Discharge: HOME | End: 2024-03-01
Payer: MEDICARE

## 2024-03-01 ENCOUNTER — HOSPITAL ENCOUNTER (OUTPATIENT)
Dept: INFUSION THERAPY | Age: 69
Discharge: HOME | End: 2024-03-01
Payer: MEDICARE

## 2024-03-01 VITALS
DIASTOLIC BLOOD PRESSURE: 68 MMHG | SYSTOLIC BLOOD PRESSURE: 142 MMHG | OXYGEN SATURATION: 98 % | HEIGHT: 63 IN | WEIGHT: 185.7 LBS | TEMPERATURE: 97.9 F | BODY MASS INDEX: 32.9 KG/M2 | HEART RATE: 80 BPM

## 2024-03-01 DIAGNOSIS — C50.919 INFILTRATING DUCTAL CARCINOMA (HCC): ICD-10-CM

## 2024-03-01 DIAGNOSIS — C50.919 INFILTRATING DUCTAL CARCINOMA (HCC): Primary | ICD-10-CM

## 2024-03-01 DIAGNOSIS — D50.8 IRON DEFICIENCY ANEMIA SECONDARY TO INADEQUATE DIETARY IRON INTAKE: Primary | ICD-10-CM

## 2024-03-01 DIAGNOSIS — R42 DIZZY SPELLS: ICD-10-CM

## 2024-03-01 LAB
ALBUMIN SERPL-MCNC: 4.3 G/DL (ref 3.5–5.2)
ALP SERPL-CCNC: 215 U/L (ref 35–104)
ALT SERPL-CCNC: 59 U/L (ref 9–52)
ANION GAP SERPL CALCULATED.3IONS-SCNC: 6 MMOL/L (ref 7–19)
AST SERPL-CCNC: 48 U/L (ref 14–36)
BASOPHILS # BLD: 0.03 K/UL (ref 0.01–0.08)
BASOPHILS NFR BLD: 0.6 % (ref 0.1–1.2)
BILIRUB SERPL-MCNC: 0.4 MG/DL (ref 0.2–1.3)
BUN SERPL-MCNC: 14 MG/DL (ref 7–17)
CALCIUM SERPL-MCNC: 9.6 MG/DL (ref 8.4–10.2)
CHLORIDE SERPL-SCNC: 103 MMOL/L (ref 98–111)
CO2 SERPL-SCNC: 30 MMOL/L (ref 22–29)
CREAT SERPL-MCNC: 0.7 MG/DL (ref 0.5–1)
EOSINOPHIL # BLD: 0.14 K/UL (ref 0.04–0.54)
EOSINOPHIL NFR BLD: 2.6 % (ref 0.7–7)
ERYTHROCYTE [DISTWIDTH] IN BLOOD BY AUTOMATED COUNT: 17.2 % (ref 11.7–14.4)
GLOBULIN: 3.3 G/DL
GLUCOSE SERPL-MCNC: 106 MG/DL (ref 74–106)
HCT VFR BLD AUTO: 35.4 % (ref 34.1–44.9)
HGB BLD-MCNC: 11.5 G/DL (ref 11.2–15.7)
LYMPHOCYTES # BLD: 1.52 K/UL (ref 1.18–3.74)
LYMPHOCYTES NFR BLD: 28 % (ref 19.3–53.1)
MCH RBC QN AUTO: 30.5 PG (ref 25.6–32.2)
MCHC RBC AUTO-ENTMCNC: 32.5 G/DL (ref 32.3–35.5)
MCV RBC AUTO: 93.9 FL (ref 79.4–94.8)
MONOCYTES # BLD: 0.38 K/UL (ref 0.24–0.82)
MONOCYTES NFR BLD: 7 % (ref 4.7–12.5)
NEUTROPHILS # BLD: 3.35 K/UL (ref 1.56–6.13)
NEUTS SEG NFR BLD: 61.6 % (ref 34–71.1)
PLATELET # BLD AUTO: 306 K/UL (ref 182–369)
PMV BLD AUTO: 9.3 FL (ref 7.4–10.4)
POTASSIUM SERPL-SCNC: 4.2 MMOL/L (ref 3.5–5.1)
PROT SERPL-MCNC: 7.6 G/DL (ref 6.3–8.2)
RBC # BLD AUTO: 3.77 M/UL (ref 3.93–5.22)
SODIUM SERPL-SCNC: 139 MMOL/L (ref 137–145)
WBC # BLD AUTO: 5.43 K/UL (ref 3.98–10.04)

## 2024-03-01 PROCEDURE — 85025 COMPLETE CBC W/AUTO DIFF WBC: CPT

## 2024-03-01 PROCEDURE — 36415 COLL VENOUS BLD VENIPUNCTURE: CPT

## 2024-03-01 PROCEDURE — 80053 COMPREHEN METABOLIC PANEL: CPT

## 2024-03-01 PROCEDURE — 93880 EXTRACRANIAL BILAT STUDY: CPT

## 2024-03-01 PROCEDURE — 6360000002 HC RX W HCPCS: Performed by: INTERNAL MEDICINE

## 2024-03-01 PROCEDURE — 96401 CHEMO ANTI-NEOPL SQ/IM: CPT

## 2024-03-01 PROCEDURE — 96402 CHEMO HORMON ANTINEOPL SQ/IM: CPT

## 2024-03-01 RX ORDER — LAMOTRIGINE 25 MG/1
500 TABLET ORAL ONCE
Status: COMPLETED | OUTPATIENT
Start: 2024-03-01 | End: 2024-03-01

## 2024-03-01 RX ADMIN — FULVESTRANT 500 MG: 50 INJECTION, SOLUTION INTRAMUSCULAR at 10:20

## 2024-03-29 ENCOUNTER — TELEPHONE (OUTPATIENT)
Dept: HEMATOLOGY | Age: 69
End: 2024-03-29

## 2024-03-29 ENCOUNTER — HOSPITAL ENCOUNTER (OUTPATIENT)
Dept: INFUSION THERAPY | Age: 69
Discharge: HOME | End: 2024-03-29
Payer: MEDICARE

## 2024-03-29 ENCOUNTER — TRANSCRIBE ORDERS (OUTPATIENT)
Dept: ADMINISTRATIVE | Facility: HOSPITAL | Age: 69
End: 2024-03-29
Payer: MEDICARE

## 2024-03-29 VITALS
HEIGHT: 63 IN | SYSTOLIC BLOOD PRESSURE: 142 MMHG | TEMPERATURE: 97.2 F | OXYGEN SATURATION: 99 % | BODY MASS INDEX: 33.13 KG/M2 | DIASTOLIC BLOOD PRESSURE: 72 MMHG | HEART RATE: 72 BPM | WEIGHT: 187 LBS

## 2024-03-29 DIAGNOSIS — R79.89 ELEVATED LIVER FUNCTION TESTS: Primary | ICD-10-CM

## 2024-03-29 DIAGNOSIS — C50.919 INFILTRATING DUCTAL CARCINOMA (HCC): Primary | ICD-10-CM

## 2024-03-29 DIAGNOSIS — R79.89 ELEVATED LFTS: ICD-10-CM

## 2024-03-29 LAB
ALBUMIN SERPL-MCNC: 4.3 G/DL (ref 3.5–5.2)
ALP SERPL-CCNC: 320 U/L (ref 35–104)
ALT SERPL-CCNC: 155 U/L (ref 9–52)
ANION GAP SERPL CALCULATED.3IONS-SCNC: 7 MMOL/L (ref 7–19)
AST SERPL-CCNC: 148 U/L (ref 14–36)
BASOPHILS # BLD: 0.03 K/UL (ref 0.01–0.08)
BASOPHILS NFR BLD: 0.5 % (ref 0.1–1.2)
BILIRUB SERPL-MCNC: 0.4 MG/DL (ref 0.2–1.3)
BUN SERPL-MCNC: 15 MG/DL (ref 7–17)
CALCIUM SERPL-MCNC: 9.2 MG/DL (ref 8.4–10.2)
CHLORIDE SERPL-SCNC: 103 MMOL/L (ref 98–111)
CO2 SERPL-SCNC: 29 MMOL/L (ref 22–29)
CREAT SERPL-MCNC: 0.7 MG/DL (ref 0.5–1)
EOSINOPHIL # BLD: 0.15 K/UL (ref 0.04–0.54)
EOSINOPHIL NFR BLD: 2.6 % (ref 0.7–7)
ERYTHROCYTE [DISTWIDTH] IN BLOOD BY AUTOMATED COUNT: 16.7 % (ref 11.7–14.4)
GLOBULIN: 3.2 G/DL
GLUCOSE SERPL-MCNC: 95 MG/DL (ref 74–106)
HCT VFR BLD AUTO: 36.7 % (ref 34.1–44.9)
HGB BLD-MCNC: 12 G/DL (ref 11.2–15.7)
LYMPHOCYTES # BLD: 1.7 K/UL (ref 1.18–3.74)
LYMPHOCYTES NFR BLD: 29.2 % (ref 19.3–53.1)
MCH RBC QN AUTO: 31.5 PG (ref 25.6–32.2)
MCHC RBC AUTO-ENTMCNC: 32.7 G/DL (ref 32.3–35.5)
MCV RBC AUTO: 96.3 FL (ref 79.4–94.8)
MONOCYTES # BLD: 0.38 K/UL (ref 0.24–0.82)
MONOCYTES NFR BLD: 6.5 % (ref 4.7–12.5)
NEUTROPHILS # BLD: 3.55 K/UL (ref 1.56–6.13)
NEUTS SEG NFR BLD: 61 % (ref 34–71.1)
PLATELET # BLD AUTO: 292 K/UL (ref 182–369)
PMV BLD AUTO: 8.8 FL (ref 7.4–10.4)
POTASSIUM SERPL-SCNC: 4.3 MMOL/L (ref 3.5–5.1)
PROT SERPL-MCNC: 7.4 G/DL (ref 6.3–8.2)
RBC # BLD AUTO: 3.81 M/UL (ref 3.93–5.22)
SODIUM SERPL-SCNC: 139 MMOL/L (ref 137–145)
WBC # BLD AUTO: 5.82 K/UL (ref 3.98–10.04)

## 2024-03-29 PROCEDURE — 85025 COMPLETE CBC W/AUTO DIFF WBC: CPT

## 2024-03-29 PROCEDURE — 96402 CHEMO HORMON ANTINEOPL SQ/IM: CPT

## 2024-03-29 PROCEDURE — 36415 COLL VENOUS BLD VENIPUNCTURE: CPT

## 2024-03-29 PROCEDURE — 80053 COMPREHEN METABOLIC PANEL: CPT

## 2024-03-29 PROCEDURE — 6360000002 HC RX W HCPCS: Performed by: INTERNAL MEDICINE

## 2024-03-29 RX ORDER — LAMOTRIGINE 25 MG/1
500 TABLET ORAL ONCE
Status: COMPLETED | OUTPATIENT
Start: 2024-03-29 | End: 2024-03-29

## 2024-03-29 RX ADMIN — FULVESTRANT 500 MG: 50 INJECTION, SOLUTION INTRAMUSCULAR at 11:18

## 2024-03-29 NOTE — TELEPHONE ENCOUNTER
I have contacted patient per  wanting to repeat US of Liver due to elevated LFTs. I have called and spoken to [facility] to have scheduled, but US department is currently closed for the weekend.Central scheduling Rocio, has stated having a e-mail sent out to have image scheduled for Monday or Tuesday morning, due to patient living out of town. Rocio has stated that she will contact our office on Monday morning after being scheduled. I have contacted patient explaining situation and we will call back Monday with time and date of appt. Patient has verbalized understanding and awaiting US Liver appt at [facility].

## 2024-04-01 ENCOUNTER — HOSPITAL ENCOUNTER (OUTPATIENT)
Dept: ULTRASOUND IMAGING | Facility: HOSPITAL | Age: 69
Discharge: HOME | End: 2024-04-01
Admitting: INTERNAL MEDICINE
Payer: MEDICARE

## 2024-04-01 ENCOUNTER — HOSPITAL ENCOUNTER (OUTPATIENT)
Dept: RADIATION ONCOLOGY | Facility: HOSPITAL | Age: 69
Setting detail: RADIATION/ONCOLOGY SERIES
End: 2024-04-01
Payer: MEDICARE

## 2024-04-01 ENCOUNTER — OFFICE VISIT (OUTPATIENT)
Dept: RADIATION ONCOLOGY | Facility: HOSPITAL | Age: 69
End: 2024-04-01
Payer: MEDICARE

## 2024-04-01 VITALS
DIASTOLIC BLOOD PRESSURE: 60 MMHG | BODY MASS INDEX: 33.13 KG/M2 | WEIGHT: 187 LBS | SYSTOLIC BLOOD PRESSURE: 126 MMHG | HEIGHT: 63 IN

## 2024-04-01 DIAGNOSIS — C50.411 MALIGNANT NEOPLASM OF UPPER-OUTER QUADRANT OF RIGHT BREAST IN FEMALE, ESTROGEN RECEPTOR POSITIVE: Primary | ICD-10-CM

## 2024-04-01 DIAGNOSIS — Z90.13 S/P BILATERAL MASTECTOMY: ICD-10-CM

## 2024-04-01 DIAGNOSIS — R79.89 ELEVATED LIVER FUNCTION TESTS: ICD-10-CM

## 2024-04-01 DIAGNOSIS — Z98.890 S/P LYMPH NODE BIOPSY: ICD-10-CM

## 2024-04-01 DIAGNOSIS — Z78.9 NON-SMOKER: ICD-10-CM

## 2024-04-01 DIAGNOSIS — Z17.0 MALIGNANT NEOPLASM OF UPPER-OUTER QUADRANT OF RIGHT BREAST IN FEMALE, ESTROGEN RECEPTOR POSITIVE: Primary | ICD-10-CM

## 2024-04-01 DIAGNOSIS — Z92.3 HISTORY OF RADIATION THERAPY: ICD-10-CM

## 2024-04-01 PROCEDURE — G0463 HOSPITAL OUTPT CLINIC VISIT: HCPCS | Performed by: RADIOLOGY

## 2024-04-01 PROCEDURE — 76705 ECHO EXAM OF ABDOMEN: CPT

## 2024-04-02 ENCOUNTER — OFFICE VISIT (OUTPATIENT)
Dept: CARDIOLOGY | Facility: CLINIC | Age: 69
End: 2024-04-02
Payer: MEDICARE

## 2024-04-02 VITALS
HEIGHT: 63 IN | DIASTOLIC BLOOD PRESSURE: 77 MMHG | WEIGHT: 185 LBS | SYSTOLIC BLOOD PRESSURE: 123 MMHG | BODY MASS INDEX: 32.78 KG/M2 | OXYGEN SATURATION: 97 % | HEART RATE: 68 BPM

## 2024-04-02 DIAGNOSIS — E66.09 CLASS 1 OBESITY DUE TO EXCESS CALORIES WITH SERIOUS COMORBIDITY AND BODY MASS INDEX (BMI) OF 32.0 TO 32.9 IN ADULT: ICD-10-CM

## 2024-04-02 DIAGNOSIS — I10 PRIMARY HYPERTENSION: Primary | ICD-10-CM

## 2024-04-02 PROBLEM — E66.811 CLASS 1 OBESITY DUE TO EXCESS CALORIES WITH SERIOUS COMORBIDITY AND BODY MASS INDEX (BMI) OF 33.0 TO 33.9 IN ADULT: Status: ACTIVE | Noted: 2024-04-02

## 2024-04-02 PROCEDURE — 1160F RVW MEDS BY RX/DR IN RCRD: CPT | Performed by: NURSE PRACTITIONER

## 2024-04-02 PROCEDURE — 3074F SYST BP LT 130 MM HG: CPT | Performed by: NURSE PRACTITIONER

## 2024-04-02 PROCEDURE — 3078F DIAST BP <80 MM HG: CPT | Performed by: NURSE PRACTITIONER

## 2024-04-02 PROCEDURE — 1159F MED LIST DOCD IN RCRD: CPT | Performed by: NURSE PRACTITIONER

## 2024-04-02 RX ORDER — AMLODIPINE BESYLATE 2.5 MG/1
2.5 TABLET ORAL DAILY
Qty: 30 TABLET | Refills: 11 | Status: SHIPPED | OUTPATIENT
Start: 2024-04-02

## 2024-04-26 ENCOUNTER — HOSPITAL ENCOUNTER (OUTPATIENT)
Dept: INFUSION THERAPY | Age: 69
Discharge: HOME | End: 2024-04-26
Payer: MEDICARE

## 2024-04-26 VITALS
OXYGEN SATURATION: 99 % | DIASTOLIC BLOOD PRESSURE: 90 MMHG | HEART RATE: 81 BPM | WEIGHT: 191 LBS | TEMPERATURE: 97.6 F | SYSTOLIC BLOOD PRESSURE: 126 MMHG | BODY MASS INDEX: 33.84 KG/M2 | HEIGHT: 63 IN

## 2024-04-26 DIAGNOSIS — C50.919 INFILTRATING DUCTAL CARCINOMA (HCC): Primary | ICD-10-CM

## 2024-04-26 LAB
BASOPHILS # BLD: 0.03 K/UL (ref 0.01–0.08)
BASOPHILS NFR BLD: 0.5 % (ref 0.1–1.2)
EOSINOPHIL # BLD: 0.14 K/UL (ref 0.04–0.54)
EOSINOPHIL NFR BLD: 2.4 % (ref 0.7–7)
ERYTHROCYTE [DISTWIDTH] IN BLOOD BY AUTOMATED COUNT: 15.7 % (ref 11.7–14.4)
HCT VFR BLD AUTO: 36.7 % (ref 34.1–44.9)
HGB BLD-MCNC: 12.2 G/DL (ref 11.2–15.7)
LYMPHOCYTES # BLD: 1.81 K/UL (ref 1.18–3.74)
LYMPHOCYTES NFR BLD: 30.6 % (ref 19.3–53.1)
MCH RBC QN AUTO: 33.2 PG (ref 25.6–32.2)
MCHC RBC AUTO-ENTMCNC: 33.2 G/DL (ref 32.3–35.5)
MCV RBC AUTO: 100 FL (ref 79.4–94.8)
MONOCYTES # BLD: 0.44 K/UL (ref 0.24–0.82)
MONOCYTES NFR BLD: 7.4 % (ref 4.7–12.5)
NEUTROPHILS # BLD: 3.48 K/UL (ref 1.56–6.13)
NEUTS SEG NFR BLD: 58.8 % (ref 34–71.1)
PLATELET # BLD AUTO: 312 K/UL (ref 182–369)
PMV BLD AUTO: 9.1 FL (ref 7.4–10.4)
RBC # BLD AUTO: 3.67 M/UL (ref 3.93–5.22)
WBC # BLD AUTO: 5.92 K/UL (ref 3.98–10.04)

## 2024-04-26 PROCEDURE — 6360000002 HC RX W HCPCS: Performed by: INTERNAL MEDICINE

## 2024-04-26 PROCEDURE — 36415 COLL VENOUS BLD VENIPUNCTURE: CPT

## 2024-04-26 PROCEDURE — 96402 CHEMO HORMON ANTINEOPL SQ/IM: CPT

## 2024-04-26 PROCEDURE — 85025 COMPLETE CBC W/AUTO DIFF WBC: CPT

## 2024-04-26 RX ORDER — LAMOTRIGINE 25 MG/1
500 TABLET ORAL ONCE
Status: COMPLETED | OUTPATIENT
Start: 2024-04-26 | End: 2024-04-26

## 2024-04-26 RX ADMIN — FULVESTRANT 500 MG: 50 INJECTION, SOLUTION INTRAMUSCULAR at 10:49

## 2024-04-30 ENCOUNTER — OFFICE VISIT (OUTPATIENT)
Dept: CARDIOLOGY | Facility: CLINIC | Age: 69
End: 2024-04-30
Payer: MEDICARE

## 2024-04-30 VITALS
HEIGHT: 63 IN | DIASTOLIC BLOOD PRESSURE: 81 MMHG | WEIGHT: 193 LBS | SYSTOLIC BLOOD PRESSURE: 136 MMHG | BODY MASS INDEX: 34.2 KG/M2 | OXYGEN SATURATION: 98 % | HEART RATE: 83 BPM

## 2024-04-30 DIAGNOSIS — E66.09 CLASS 1 OBESITY DUE TO EXCESS CALORIES WITH SERIOUS COMORBIDITY AND BODY MASS INDEX (BMI) OF 34.0 TO 34.9 IN ADULT: ICD-10-CM

## 2024-04-30 DIAGNOSIS — I10 PRIMARY HYPERTENSION: Primary | ICD-10-CM

## 2024-04-30 PROCEDURE — 1159F MED LIST DOCD IN RCRD: CPT | Performed by: NURSE PRACTITIONER

## 2024-04-30 PROCEDURE — 1160F RVW MEDS BY RX/DR IN RCRD: CPT | Performed by: NURSE PRACTITIONER

## 2024-04-30 PROCEDURE — 99213 OFFICE O/P EST LOW 20 MIN: CPT | Performed by: NURSE PRACTITIONER

## 2024-04-30 PROCEDURE — 3079F DIAST BP 80-89 MM HG: CPT | Performed by: NURSE PRACTITIONER

## 2024-04-30 PROCEDURE — 3075F SYST BP GE 130 - 139MM HG: CPT | Performed by: NURSE PRACTITIONER

## 2024-04-30 RX ORDER — METOPROLOL SUCCINATE 25 MG/1
25 TABLET, EXTENDED RELEASE ORAL DAILY
Qty: 30 TABLET | Refills: 11 | Status: SHIPPED | OUTPATIENT
Start: 2024-04-30

## 2024-05-24 ENCOUNTER — HOSPITAL ENCOUNTER (OUTPATIENT)
Dept: INFUSION THERAPY | Age: 69
Discharge: HOME | End: 2024-05-24
Payer: MEDICARE

## 2024-05-24 VITALS
WEIGHT: 193 LBS | SYSTOLIC BLOOD PRESSURE: 128 MMHG | BODY MASS INDEX: 34.19 KG/M2 | OXYGEN SATURATION: 98 % | HEART RATE: 74 BPM | DIASTOLIC BLOOD PRESSURE: 80 MMHG

## 2024-05-24 DIAGNOSIS — C50.919 INFILTRATING DUCTAL CARCINOMA (HCC): Primary | ICD-10-CM

## 2024-05-24 LAB
ALBUMIN SERPL-MCNC: 4.5 G/DL (ref 3.5–5.2)
ALP SERPL-CCNC: 264 U/L (ref 35–104)
ALT SERPL-CCNC: 170 U/L (ref 9–52)
ANION GAP SERPL CALCULATED.3IONS-SCNC: 9 MMOL/L (ref 7–19)
AST SERPL-CCNC: 194 U/L (ref 14–36)
BASOPHILS # BLD: 0.02 K/UL (ref 0.01–0.08)
BASOPHILS NFR BLD: 0.4 % (ref 0.1–1.2)
BILIRUB SERPL-MCNC: 0.7 MG/DL (ref 0.2–1.3)
BUN SERPL-MCNC: 14 MG/DL (ref 7–17)
CALCIUM SERPL-MCNC: 9.2 MG/DL (ref 8.4–10.2)
CHLORIDE SERPL-SCNC: 100 MMOL/L (ref 98–111)
CO2 SERPL-SCNC: 29 MMOL/L (ref 22–29)
CREAT SERPL-MCNC: 0.7 MG/DL (ref 0.5–1)
EOSINOPHIL # BLD: 0.08 K/UL (ref 0.04–0.54)
EOSINOPHIL NFR BLD: 1.5 % (ref 0.7–7)
ERYTHROCYTE [DISTWIDTH] IN BLOOD BY AUTOMATED COUNT: 12.8 % (ref 11.7–14.4)
GLOBULIN: 2.6 G/DL
GLUCOSE SERPL-MCNC: 121 MG/DL (ref 74–106)
HCT VFR BLD AUTO: 37.5 % (ref 34.1–44.9)
HGB BLD-MCNC: 12.6 G/DL (ref 11.2–15.7)
LYMPHOCYTES # BLD: 1.39 K/UL (ref 1.18–3.74)
LYMPHOCYTES NFR BLD: 26.1 % (ref 19.3–53.1)
MCH RBC QN AUTO: 34.2 PG (ref 25.6–32.2)
MCHC RBC AUTO-ENTMCNC: 33.6 G/DL (ref 32.3–35.5)
MCV RBC AUTO: 101.9 FL (ref 79.4–94.8)
MONOCYTES # BLD: 0.3 K/UL (ref 0.24–0.82)
MONOCYTES NFR BLD: 5.6 % (ref 4.7–12.5)
NEUTROPHILS # BLD: 3.52 K/UL (ref 1.56–6.13)
NEUTS SEG NFR BLD: 66.2 % (ref 34–71.1)
PLATELET # BLD AUTO: 284 K/UL (ref 182–369)
PMV BLD AUTO: 9.4 FL (ref 7.4–10.4)
POTASSIUM SERPL-SCNC: 3.9 MMOL/L (ref 3.5–5.1)
PROT SERPL-MCNC: 7.1 G/DL (ref 6.3–8.2)
RBC # BLD AUTO: 3.68 M/UL (ref 3.93–5.22)
SODIUM SERPL-SCNC: 138 MMOL/L (ref 137–145)
WBC # BLD AUTO: 5.32 K/UL (ref 3.98–10.04)

## 2024-05-24 PROCEDURE — 85025 COMPLETE CBC W/AUTO DIFF WBC: CPT

## 2024-05-24 PROCEDURE — 96402 CHEMO HORMON ANTINEOPL SQ/IM: CPT

## 2024-05-24 PROCEDURE — 80053 COMPREHEN METABOLIC PANEL: CPT

## 2024-05-24 PROCEDURE — 36415 COLL VENOUS BLD VENIPUNCTURE: CPT

## 2024-05-24 PROCEDURE — 6360000002 HC RX W HCPCS: Performed by: INTERNAL MEDICINE

## 2024-05-24 RX ORDER — LAMOTRIGINE 25 MG/1
500 TABLET ORAL ONCE
Status: COMPLETED | OUTPATIENT
Start: 2024-05-24 | End: 2024-05-24

## 2024-05-24 RX ADMIN — FULVESTRANT 500 MG: 50 INJECTION, SOLUTION INTRAMUSCULAR at 10:15

## 2024-05-31 ENCOUNTER — TELEPHONE (OUTPATIENT)
Dept: HEMATOLOGY | Age: 69
End: 2024-05-31

## 2024-05-31 DIAGNOSIS — R74.8 ELEVATED LIVER ENZYMES: Primary | ICD-10-CM

## 2024-05-31 NOTE — TELEPHONE ENCOUNTER
Referral to GI Dr. Sexton placed at this time for elevated liver enzymes. Patient is aware of elevated liver enzymes and that a referral to GI was placed. Patient notified that someone should be calling her to get that scheduled. Electronically signed by Mellisa Centeno RN on 5/31/2024 at 5:31 PM

## 2024-06-06 ENCOUNTER — OFFICE VISIT (OUTPATIENT)
Dept: CARDIOLOGY | Facility: CLINIC | Age: 69
End: 2024-06-06
Payer: MEDICARE

## 2024-06-06 VITALS
SYSTOLIC BLOOD PRESSURE: 125 MMHG | BODY MASS INDEX: 34.38 KG/M2 | HEIGHT: 63 IN | HEART RATE: 82 BPM | WEIGHT: 194 LBS | DIASTOLIC BLOOD PRESSURE: 80 MMHG | OXYGEN SATURATION: 98 %

## 2024-06-06 DIAGNOSIS — I10 PRIMARY HYPERTENSION: Primary | ICD-10-CM

## 2024-06-06 DIAGNOSIS — E66.09 CLASS 1 OBESITY DUE TO EXCESS CALORIES WITH SERIOUS COMORBIDITY AND BODY MASS INDEX (BMI) OF 34.0 TO 34.9 IN ADULT: ICD-10-CM

## 2024-06-06 PROCEDURE — 99213 OFFICE O/P EST LOW 20 MIN: CPT | Performed by: NURSE PRACTITIONER

## 2024-06-06 PROCEDURE — 3079F DIAST BP 80-89 MM HG: CPT | Performed by: NURSE PRACTITIONER

## 2024-06-06 PROCEDURE — 1160F RVW MEDS BY RX/DR IN RCRD: CPT | Performed by: NURSE PRACTITIONER

## 2024-06-06 PROCEDURE — 1159F MED LIST DOCD IN RCRD: CPT | Performed by: NURSE PRACTITIONER

## 2024-06-06 PROCEDURE — 3074F SYST BP LT 130 MM HG: CPT | Performed by: NURSE PRACTITIONER

## 2024-06-06 RX ORDER — METOPROLOL SUCCINATE 25 MG/1
25 TABLET, EXTENDED RELEASE ORAL DAILY
Qty: 90 TABLET | Refills: 3 | Status: SHIPPED | OUTPATIENT
Start: 2024-06-06

## 2024-06-06 RX ORDER — AMLODIPINE BESYLATE 5 MG/1
5 TABLET ORAL DAILY
Qty: 90 TABLET | Refills: 3 | Status: SHIPPED | OUTPATIENT
Start: 2024-06-06

## 2024-06-07 ENCOUNTER — OFFICE VISIT (OUTPATIENT)
Dept: GASTROENTEROLOGY | Age: 69
End: 2024-06-07
Payer: MEDICARE

## 2024-06-07 VITALS
HEART RATE: 79 BPM | HEIGHT: 63 IN | DIASTOLIC BLOOD PRESSURE: 80 MMHG | WEIGHT: 194 LBS | OXYGEN SATURATION: 98 % | SYSTOLIC BLOOD PRESSURE: 150 MMHG | BODY MASS INDEX: 34.38 KG/M2

## 2024-06-07 DIAGNOSIS — R93.89 ABNORMAL ULTRASOUND: ICD-10-CM

## 2024-06-07 DIAGNOSIS — R74.8 ELEVATED LIVER ENZYMES: Primary | ICD-10-CM

## 2024-06-07 PROCEDURE — G8400 PT W/DXA NO RESULTS DOC: HCPCS | Performed by: NURSE PRACTITIONER

## 2024-06-07 PROCEDURE — 1123F ACP DISCUSS/DSCN MKR DOCD: CPT | Performed by: NURSE PRACTITIONER

## 2024-06-07 PROCEDURE — 99204 OFFICE O/P NEW MOD 45 MIN: CPT | Performed by: NURSE PRACTITIONER

## 2024-06-07 PROCEDURE — G8427 DOCREV CUR MEDS BY ELIG CLIN: HCPCS | Performed by: NURSE PRACTITIONER

## 2024-06-07 PROCEDURE — 1036F TOBACCO NON-USER: CPT | Performed by: NURSE PRACTITIONER

## 2024-06-07 PROCEDURE — G8417 CALC BMI ABV UP PARAM F/U: HCPCS | Performed by: NURSE PRACTITIONER

## 2024-06-07 PROCEDURE — 3017F COLORECTAL CA SCREEN DOC REV: CPT | Performed by: NURSE PRACTITIONER

## 2024-06-07 PROCEDURE — 1090F PRES/ABSN URINE INCON ASSESS: CPT | Performed by: NURSE PRACTITIONER

## 2024-06-07 RX ORDER — AMLODIPINE BESYLATE 5 MG/1
5 TABLET ORAL DAILY
COMMUNITY
Start: 2024-06-06

## 2024-06-07 RX ORDER — METOPROLOL SUCCINATE 25 MG/1
25 TABLET, EXTENDED RELEASE ORAL DAILY
COMMUNITY
Start: 2024-06-06

## 2024-06-07 NOTE — PROGRESS NOTES
(SINEQUAN) 25 MG capsule 1 capsule nightly      Calcium-Phosphorus-Vitamin D 250-107-500 MG-MG-UNIT CHEW Take 1 tablet by mouth every evening      vitamin D (CHOLECALCIFEROL) 25 MCG (1000 UT) TABS tablet Take 1 tablet by mouth daily      Collagen-Vitamin C 1000-10 MG TABS Take 1 tablet by mouth daily      cyanocobalamin 1000 MCG tablet Take 1 tablet by mouth Once a week at 5 PM      Multiple Vitamins-Minerals (MULTIVITAMIN ADULTS 50+) TABS Take 1 tablet by mouth daily      fluocinonide (LIDEX) 0.05 % external solution Apply 0.5 % topically as needed Apply topically 2 times daily.       No current facility-administered medications for this visit.       Allergies   Allergen Reactions    Penicillins Hives and Itching    Other Diarrhea and Nausea Only     Sweating      Bicitra           Objective:     BP (!) 150/80 (Site: Left Upper Arm)   Pulse 79   Ht 1.6 m (5' 3\")   Wt 88 kg (194 lb)   SpO2 98%   BMI 34.37 kg/m²     Physical Exam  Vitals reviewed.   Constitutional:       General: She is not in acute distress.     Appearance: She is well-developed.   HENT:      Head: Normocephalic and atraumatic.      Right Ear: External ear normal.      Left Ear: External ear normal.      Nose: Nose normal.   Eyes:      General: No scleral icterus.        Right eye: No discharge.         Left eye: No discharge.      Conjunctiva/sclera: Conjunctivae normal.      Pupils: Pupils are equal, round, and reactive to light.   Cardiovascular:      Rate and Rhythm: Normal rate and regular rhythm.      Heart sounds: Normal heart sounds. No murmur heard.  Pulmonary:      Effort: Pulmonary effort is normal. No respiratory distress.      Breath sounds: Normal breath sounds. No wheezing or rales.   Abdominal:      General: Bowel sounds are normal. There is no distension.      Palpations: Abdomen is soft. There is no mass.      Tenderness: There is no abdominal tenderness. There is no guarding or rebound.   Musculoskeletal:         General:

## 2024-06-11 ASSESSMENT — ENCOUNTER SYMPTOMS
CHOKING: 0
CONSTIPATION: 0
TROUBLE SWALLOWING: 0
VOMITING: 0
DIARRHEA: 0
BLOOD IN STOOL: 0
ABDOMINAL DISTENTION: 0
COUGH: 0
RECTAL PAIN: 0
ABDOMINAL PAIN: 0
ANAL BLEEDING: 0
SHORTNESS OF BREATH: 0
NAUSEA: 0

## 2024-06-18 ENCOUNTER — TELEPHONE (OUTPATIENT)
Dept: HEMATOLOGY | Age: 69
End: 2024-06-18

## 2024-06-18 NOTE — TELEPHONE ENCOUNTER
Called patient and reminded patient of their appointment on 6/21/2024 and patient confirmed they would be here

## 2024-06-20 DIAGNOSIS — R74.8 ELEVATED LIVER ENZYMES: ICD-10-CM

## 2024-06-20 DIAGNOSIS — C50.919 INFILTRATING DUCTAL CARCINOMA (HCC): Primary | ICD-10-CM

## 2024-06-20 NOTE — PROGRESS NOTES
moderate dilation of the common hepatic duct, measuring 1.8 cm, compared with 1 cm on the ultrasound from 2012. No visible choledocholithiasis is identified, however only the proximal extrahepatic bile ducts can be visualized. Consider follow-up with MRI of the abdomen with MRCP.       GI planning MRCP    PLAN:  RTC with MD in 6 weeks  CBC, CMP, Iron profile, ferritin today  Continue Faslodex on 7/1/24 and every 4 weeks  Proceed with MRI Abdomen W WO Contrast and MRCP, 7/1/24  Continue follow-up with Dr.Shakira Cobos/[facility] Cardiology, 9/17/24  Continue follow-up with Dr. Bolanos/[facility] Plastic Surgery  Continue follow-up with Dr. Thomas/Radiation Therapy at [facility]      Follow Up:  Return in about 6 weeks (around 8/2/2024) for CBC, CMP, Appointment with Dr. Nannette VANG.  Faslodex on 7/1 in AM  Faslodex 8/2 at Dr KARIE raines I, Digna Avila am pre charting  as Medical Assistant for Marty Brunson MD. Electronically signed by Digna Avila MA on 6/21/2024 at 9:06 AM CDT.    I have seen, examined and reviewed this patient medication list, appropriate labs and imaging studies. I reviewed relevant medical records and others physician’s notes. I discussed the plans of care with the patient. I answered all the questions to the patient’s satisfaction. I have also reviewed the chief complaint (CC) and part of the history (History of Present Illness (HPI), Past Family Social History (PFSH), or Review of Systems (ROS) and made changes when appropriated.       (Please note that portions of this note were completed with a voice recognition program. Efforts were made to edit the dictations but occasionally words are mis-transcribed.)    Electronically signed by Marty Brunson MD on 6/21/2024 at 10:59 AM

## 2024-06-21 ENCOUNTER — OFFICE VISIT (OUTPATIENT)
Dept: HEMATOLOGY | Age: 69
End: 2024-06-21

## 2024-06-21 ENCOUNTER — HOSPITAL ENCOUNTER (OUTPATIENT)
Dept: INFUSION THERAPY | Age: 69
Discharge: HOME | End: 2024-06-21
Payer: MEDICARE

## 2024-06-21 VITALS
TEMPERATURE: 97.9 F | HEIGHT: 63 IN | WEIGHT: 191.9 LBS | OXYGEN SATURATION: 98 % | BODY MASS INDEX: 34 KG/M2 | SYSTOLIC BLOOD PRESSURE: 130 MMHG | HEART RATE: 78 BPM | DIASTOLIC BLOOD PRESSURE: 80 MMHG

## 2024-06-21 DIAGNOSIS — R74.8 ELEVATED LIVER ENZYMES: ICD-10-CM

## 2024-06-21 DIAGNOSIS — Z79.818 USE OF FULVESTRANT (FASLODEX): ICD-10-CM

## 2024-06-21 DIAGNOSIS — E61.1 IRON DEFICIENCY: ICD-10-CM

## 2024-06-21 DIAGNOSIS — Z71.89 CARE PLAN DISCUSSED WITH PATIENT: ICD-10-CM

## 2024-06-21 DIAGNOSIS — C50.919 INFILTRATING DUCTAL CARCINOMA (HCC): Primary | ICD-10-CM

## 2024-06-21 DIAGNOSIS — C50.919 INFILTRATING DUCTAL CARCINOMA (HCC): ICD-10-CM

## 2024-06-21 LAB
ALBUMIN SERPL-MCNC: 4.3 G/DL (ref 3.5–5.2)
ALP SERPL-CCNC: 197 U/L (ref 35–104)
ALT SERPL-CCNC: 38 U/L (ref 9–52)
ANION GAP SERPL CALCULATED.3IONS-SCNC: 9 MMOL/L (ref 7–19)
AST SERPL-CCNC: 37 U/L (ref 14–36)
BASOPHILS # BLD: 0.04 K/UL (ref 0.01–0.08)
BASOPHILS NFR BLD: 0.8 % (ref 0.1–1.2)
BILIRUB SERPL-MCNC: 0.7 MG/DL (ref 0.2–1.3)
BUN SERPL-MCNC: 12 MG/DL (ref 7–17)
CALCIUM SERPL-MCNC: 9.5 MG/DL (ref 8.4–10.2)
CHLORIDE SERPL-SCNC: 100 MMOL/L (ref 98–111)
CO2 SERPL-SCNC: 31 MMOL/L (ref 22–29)
CREAT SERPL-MCNC: 0.7 MG/DL (ref 0.5–1)
EOSINOPHIL # BLD: 0.13 K/UL (ref 0.04–0.54)
EOSINOPHIL NFR BLD: 2.5 % (ref 0.7–7)
ERYTHROCYTE [DISTWIDTH] IN BLOOD BY AUTOMATED COUNT: 12 % (ref 11.7–14.4)
FERRITIN SERPL-MCNC: 135.6 NG/ML (ref 13–150)
GLOBULIN: 3.1 G/DL
GLUCOSE SERPL-MCNC: 108 MG/DL (ref 74–106)
HCT VFR BLD AUTO: 39.7 % (ref 34.1–44.9)
HGB BLD-MCNC: 13.7 G/DL (ref 11.2–15.7)
IRON SATN MFR SERPL: 46 % (ref 14–50)
IRON SERPL-MCNC: 131 UG/DL (ref 37–145)
LYMPHOCYTES # BLD: 1.62 K/UL (ref 1.18–3.74)
LYMPHOCYTES NFR BLD: 31.4 % (ref 19.3–53.1)
MCH RBC QN AUTO: 35 PG (ref 25.6–32.2)
MCHC RBC AUTO-ENTMCNC: 34.5 G/DL (ref 32.3–35.5)
MCV RBC AUTO: 101.5 FL (ref 79.4–94.8)
MONOCYTES # BLD: 0.36 K/UL (ref 0.24–0.82)
MONOCYTES NFR BLD: 7 % (ref 4.7–12.5)
NEUTROPHILS # BLD: 3.01 K/UL (ref 1.56–6.13)
NEUTS SEG NFR BLD: 58.3 % (ref 34–71.1)
PLATELET # BLD AUTO: 295 K/UL (ref 182–369)
PMV BLD AUTO: 9.3 FL (ref 7.4–10.4)
POTASSIUM SERPL-SCNC: 4.3 MMOL/L (ref 3.5–5.1)
PROT SERPL-MCNC: 7.4 G/DL (ref 6.3–8.2)
RBC # BLD AUTO: 3.91 M/UL (ref 3.93–5.22)
SODIUM SERPL-SCNC: 140 MMOL/L (ref 137–145)
TIBC SERPL-MCNC: 285 UG/DL (ref 250–400)
WBC # BLD AUTO: 5.16 K/UL (ref 3.98–10.04)

## 2024-06-21 PROCEDURE — 99213 OFFICE O/P EST LOW 20 MIN: CPT

## 2024-06-21 PROCEDURE — 85025 COMPLETE CBC W/AUTO DIFF WBC: CPT

## 2024-06-21 PROCEDURE — 36415 COLL VENOUS BLD VENIPUNCTURE: CPT

## 2024-06-21 PROCEDURE — 80053 COMPREHEN METABOLIC PANEL: CPT

## 2024-07-01 ENCOUNTER — HOSPITAL ENCOUNTER (OUTPATIENT)
Dept: INFUSION THERAPY | Age: 69
Discharge: HOME | End: 2024-07-01
Payer: MEDICARE

## 2024-07-01 ENCOUNTER — HOSPITAL ENCOUNTER (OUTPATIENT)
Dept: MRI IMAGING | Age: 69
Discharge: HOME | End: 2024-07-01
Payer: MEDICARE

## 2024-07-01 ENCOUNTER — OFFICE VISIT (OUTPATIENT)
Dept: GASTROENTEROLOGY | Age: 69
End: 2024-07-01
Payer: MEDICARE

## 2024-07-01 VITALS
SYSTOLIC BLOOD PRESSURE: 128 MMHG | BODY MASS INDEX: 34.73 KG/M2 | DIASTOLIC BLOOD PRESSURE: 70 MMHG | HEIGHT: 63 IN | WEIGHT: 196 LBS | OXYGEN SATURATION: 97 % | HEART RATE: 83 BPM

## 2024-07-01 VITALS
SYSTOLIC BLOOD PRESSURE: 132 MMHG | DIASTOLIC BLOOD PRESSURE: 84 MMHG | HEIGHT: 63 IN | WEIGHT: 192 LBS | OXYGEN SATURATION: 92 % | BODY MASS INDEX: 34.02 KG/M2 | TEMPERATURE: 97.8 F | HEART RATE: 68 BPM

## 2024-07-01 DIAGNOSIS — Z85.3 HISTORY OF BILATERAL BREAST CANCER: ICD-10-CM

## 2024-07-01 DIAGNOSIS — Z83.79: ICD-10-CM

## 2024-07-01 DIAGNOSIS — D50.8 IRON DEFICIENCY ANEMIA SECONDARY TO INADEQUATE DIETARY IRON INTAKE: Primary | ICD-10-CM

## 2024-07-01 DIAGNOSIS — K83.8 ACQUIRED DILATION OF COMMON BILE DUCT: ICD-10-CM

## 2024-07-01 DIAGNOSIS — Z86.010 HX OF COLONIC POLYPS: ICD-10-CM

## 2024-07-01 DIAGNOSIS — K76.0 NONALCOHOLIC FATTY LIVER DISEASE: ICD-10-CM

## 2024-07-01 DIAGNOSIS — R79.89 ABNORMAL LIVER FUNCTION TESTS: ICD-10-CM

## 2024-07-01 DIAGNOSIS — Z90.49 HISTORY OF PARTIAL COLECTOMY: ICD-10-CM

## 2024-07-01 DIAGNOSIS — Z86.2 HISTORY OF MACROCYTIC ANEMIA: ICD-10-CM

## 2024-07-01 DIAGNOSIS — Z98.84 HISTORY OF ROUX-EN-Y GASTRIC BYPASS: ICD-10-CM

## 2024-07-01 DIAGNOSIS — R93.89 ABNORMAL ULTRASOUND: ICD-10-CM

## 2024-07-01 DIAGNOSIS — Z87.19 HISTORY OF CHOLELITHIASIS: ICD-10-CM

## 2024-07-01 DIAGNOSIS — C50.919 INFILTRATING DUCTAL CARCINOMA (HCC): Primary | ICD-10-CM

## 2024-07-01 DIAGNOSIS — R74.8 ELEVATED LIVER ENZYMES: ICD-10-CM

## 2024-07-01 PROCEDURE — G8417 CALC BMI ABV UP PARAM F/U: HCPCS | Performed by: INTERNAL MEDICINE

## 2024-07-01 PROCEDURE — 1036F TOBACCO NON-USER: CPT | Performed by: INTERNAL MEDICINE

## 2024-07-01 PROCEDURE — 6360000004 HC RX CONTRAST MEDICATION: Performed by: NURSE PRACTITIONER

## 2024-07-01 PROCEDURE — 1123F ACP DISCUSS/DSCN MKR DOCD: CPT | Performed by: INTERNAL MEDICINE

## 2024-07-01 PROCEDURE — 1090F PRES/ABSN URINE INCON ASSESS: CPT | Performed by: INTERNAL MEDICINE

## 2024-07-01 PROCEDURE — 3017F COLORECTAL CA SCREEN DOC REV: CPT | Performed by: INTERNAL MEDICINE

## 2024-07-01 PROCEDURE — 74183 MRI ABD W/O CNTR FLWD CNTR: CPT

## 2024-07-01 PROCEDURE — 96402 CHEMO HORMON ANTINEOPL SQ/IM: CPT

## 2024-07-01 PROCEDURE — G8400 PT W/DXA NO RESULTS DOC: HCPCS | Performed by: INTERNAL MEDICINE

## 2024-07-01 PROCEDURE — G8427 DOCREV CUR MEDS BY ELIG CLIN: HCPCS | Performed by: INTERNAL MEDICINE

## 2024-07-01 PROCEDURE — 6360000002 HC RX W HCPCS: Performed by: INTERNAL MEDICINE

## 2024-07-01 PROCEDURE — 99214 OFFICE O/P EST MOD 30 MIN: CPT | Performed by: INTERNAL MEDICINE

## 2024-07-01 PROCEDURE — A9577 INJ MULTIHANCE: HCPCS | Performed by: NURSE PRACTITIONER

## 2024-07-01 RX ORDER — LAMOTRIGINE 25 MG/1
500 TABLET ORAL ONCE
Status: COMPLETED | OUTPATIENT
Start: 2024-07-01 | End: 2024-07-01

## 2024-07-01 RX ADMIN — FULVESTRANT 500 MG: 50 INJECTION, SOLUTION INTRAMUSCULAR at 09:20

## 2024-07-01 RX ADMIN — GADOBENATE DIMEGLUMINE 18 ML: 529 INJECTION, SOLUTION INTRAVENOUS at 12:41

## 2024-07-01 ASSESSMENT — ENCOUNTER SYMPTOMS
GASTROINTESTINAL NEGATIVE: 1
RESPIRATORY NEGATIVE: 1
ALLERGIC/IMMUNOLOGIC NEGATIVE: 1
EYES NEGATIVE: 1

## 2024-07-01 NOTE — PROGRESS NOTES
Ally Medina  Primary Care Provider Lana Urrutia APRN - NP  Referral Source No ref. provider found    Ally Medina is a 68 y.o. female  ChiefComplaint:  Chief Complaint   Patient presents with    EGD    Colonoscopy    Other     GI workup        Assessment / Plan:   Diagnosis Orders   1. Iron deficiency anemia secondary to inadequate dietary iron intake        2. Abnormal liver function tests        3. Nonalcoholic fatty liver disease        4. History of cholelithiasis        5. FHx: cholecystectomy        6. Acquired dilation of common bile duct        7. History of Moose-en-Y gastric bypass        8. History of macrocytic anemia        9. History of bilateral breast cancer        10. Hx of colonic polyps        11. History of partial colectomy          -Patient has asymptomatic abnormal LFTs with dilated common bile duct noted on imaging studies and with a past history of cholelithiasis and cholecystectomy, bariatric surgery for significant obesity and possibly nonalcoholic fatty liver disease.  Obstructive biliary etiology such as gallstones seem unlikely in the absence of abdominal pain.  However other lesions including choledochocele, ampullary lesions, peripapillary diverticula and intraductal lesions in the distal common bile duct are still in the differential diagnosis, although patient's imaging studies have not revealed any pancreatic ductal dilation or discrete head of pancreas mass lesions.  With her history of Billroth II/Moose-en-Y gastrojejunal bypass, standard ERCP in this patient is almost impossible and if necessary an ERCP as to be arranged at a tertiary center with assistance from a laparoscopic surgeon to enable passage of a endoscope up to the major duodenal papilla.    -With her history of microcytic anemia with iron deficiency which appears to have recently improved, will schedule her for an EGD examination.  She will be due for a colonoscopy exam for cancer screening next  no

## 2024-07-02 ASSESSMENT — ENCOUNTER SYMPTOMS
VOICE CHANGE: 0
CHOKING: 0
TROUBLE SWALLOWING: 0
NAUSEA: 0
DIARRHEA: 0
WHEEZING: 0
COUGH: 0
EYE PAIN: 0
BLOOD IN STOOL: 0
ABDOMINAL DISTENTION: 0
EYE DISCHARGE: 0
ABDOMINAL PAIN: 0
VOMITING: 0
EYE REDNESS: 0
SORE THROAT: 0
CHEST TIGHTNESS: 0
SHORTNESS OF BREATH: 0

## 2024-07-04 NOTE — RESULT ENCOUNTER NOTE
Persistent common bile duct dilation that is unchanged since 2022    Small/moderate hiatal hernia  Left adrenal nodule is unchanged   I would recommend continued monitoring with annual MRCP   I noted that her liver enzymes are coming down

## 2024-07-08 NOTE — ADDENDUM NOTE
Encounter addended by: Melody Huynh on: 7/8/2024 2:25 PM   Actions taken: Charge Capture section accepted

## 2024-07-24 ENCOUNTER — TELEPHONE (OUTPATIENT)
Dept: HEMATOLOGY | Age: 69
End: 2024-07-24

## 2024-07-24 NOTE — TELEPHONE ENCOUNTER
Called patient today for their upcoming appointment on 08/02/2024 and patient needed to be rescheduled. Patient's name and number was taken and given to  staff ( sent to  via teams    )  so that the patient could be rescheduled to a better date & time for the patient. We have now moved to the [facility] that is located between our old office and the ER at the [facility]

## 2024-07-30 ENCOUNTER — HOSPITAL ENCOUNTER (OUTPATIENT)
Age: 69
Setting detail: SPECIMEN
Discharge: HOME | End: 2024-07-30
Payer: MEDICARE

## 2024-07-30 ENCOUNTER — ANESTHESIA EVENT (OUTPATIENT)
Dept: OPERATING ROOM | Age: 69
End: 2024-07-30

## 2024-07-30 ENCOUNTER — TELEPHONE (OUTPATIENT)
Dept: GASTROENTEROLOGY | Age: 69
End: 2024-07-30

## 2024-07-30 ENCOUNTER — APPOINTMENT (OUTPATIENT)
Dept: OPERATING ROOM | Age: 69
End: 2024-07-30
Attending: INTERNAL MEDICINE

## 2024-07-30 ENCOUNTER — HOSPITAL ENCOUNTER (OUTPATIENT)
Age: 69
Setting detail: OUTPATIENT SURGERY
Discharge: HOME | End: 2024-07-30
Attending: INTERNAL MEDICINE | Admitting: INTERNAL MEDICINE
Payer: MEDICARE

## 2024-07-30 ENCOUNTER — ANESTHESIA (OUTPATIENT)
Dept: OPERATING ROOM | Age: 69
End: 2024-07-30

## 2024-07-30 VITALS
SYSTOLIC BLOOD PRESSURE: 137 MMHG | HEART RATE: 60 BPM | HEIGHT: 63 IN | WEIGHT: 196 LBS | OXYGEN SATURATION: 98 % | TEMPERATURE: 98.3 F | BODY MASS INDEX: 34.73 KG/M2 | DIASTOLIC BLOOD PRESSURE: 74 MMHG | RESPIRATION RATE: 18 BRPM

## 2024-07-30 DIAGNOSIS — R74.8 ELEVATED LIVER ENZYMES: ICD-10-CM

## 2024-07-30 DIAGNOSIS — K83.8 ACQUIRED DILATION OF COMMON BILE DUCT: Primary | ICD-10-CM

## 2024-07-30 PROCEDURE — 43239 EGD BIOPSY SINGLE/MULTIPLE: CPT

## 2024-07-30 PROCEDURE — 88342 IMHCHEM/IMCYTCHM 1ST ANTB: CPT

## 2024-07-30 PROCEDURE — 88305 TISSUE EXAM BY PATHOLOGIST: CPT

## 2024-07-30 PROCEDURE — 43239 EGD BIOPSY SINGLE/MULTIPLE: CPT | Performed by: INTERNAL MEDICINE

## 2024-07-30 RX ORDER — SODIUM CHLORIDE, SODIUM LACTATE, POTASSIUM CHLORIDE, CALCIUM CHLORIDE 600; 310; 30; 20 MG/100ML; MG/100ML; MG/100ML; MG/100ML
INJECTION, SOLUTION INTRAVENOUS CONTINUOUS
Status: DISCONTINUED | OUTPATIENT
Start: 2024-07-30 | End: 2024-07-30 | Stop reason: HOSPADM

## 2024-07-30 RX ORDER — LIDOCAINE HYDROCHLORIDE 10 MG/ML
INJECTION, SOLUTION EPIDURAL; INFILTRATION; INTRACAUDAL; PERINEURAL PRN
Status: DISCONTINUED | OUTPATIENT
Start: 2024-07-30 | End: 2024-07-30 | Stop reason: SDUPTHER

## 2024-07-30 RX ORDER — PROPOFOL 10 MG/ML
INJECTION, EMULSION INTRAVENOUS PRN
Status: DISCONTINUED | OUTPATIENT
Start: 2024-07-30 | End: 2024-07-30 | Stop reason: SDUPTHER

## 2024-07-30 RX ADMIN — SODIUM CHLORIDE, SODIUM LACTATE, POTASSIUM CHLORIDE, CALCIUM CHLORIDE: 600; 310; 30; 20 INJECTION, SOLUTION INTRAVENOUS at 09:08

## 2024-07-30 RX ADMIN — LIDOCAINE HYDROCHLORIDE 50 MG: 10 INJECTION, SOLUTION EPIDURAL; INFILTRATION; INTRACAUDAL; PERINEURAL at 09:16

## 2024-07-30 RX ADMIN — PROPOFOL 150 MG: 10 INJECTION, EMULSION INTRAVENOUS at 09:16

## 2024-07-30 ASSESSMENT — PAIN - FUNCTIONAL ASSESSMENT: PAIN_FUNCTIONAL_ASSESSMENT: NONE - DENIES PAIN

## 2024-07-30 NOTE — TELEPHONE ENCOUNTER
Her appointment 10/1/24 is fine and I will place the order for the MRCP so we can have those results and her labs

## 2024-07-30 NOTE — DISCHARGE INSTRUCTIONS
1.  Await path results, the patient will be contacted in 7-10 days with biopsy results.   2.  Colonoscopy next year for colon cancer screening; sooner if her anemia were to worsen or if she were to have symptoms such as  bleeding, abdominal pain, change in bowel habits or stool caliber or if the patient has anemia or unexplained weight loss in the future.   3. - Resume previous meds and diet    4.  Monitor CBC periodically and check serum iron studies and vitamin B12 level in 2-3 months    5. GI clinic f/u in 8 to 10 weeks weeks with Ms. Sanchez or Ms. Seymour; will need an MRI with MRCP at that time for follow-up of her dilated common bile duct and abnormal LFTs.    - Keep scheduled f/u appts with other MDs     - NO ASA/NSAIDs x 2 weeks NSAIDS (Non-steroidal Anti-Inflammatory)    You have been directed by your physician to avoid any NSAID's; the following medications are a list of those to avoid. If you think that you are taking any NSAID's notify your physician.     Over The Counter    Advil                      Motrin  Nuprin                   Ibuprofen  Midol                     Aleve  Naproxen              Orudis  Aspirin                   Glenys-[place]    Prescriptions and Generics    Cataflam              Relafen  Voltaren               Clinoril  Indocin                 Naproxen  Arthrotec              Lodine  Daypro                 Nalfon  Toradol                Ansaid  Feldene               Meclofenamate  Fenoprofen          Ponstel  Mobic                   Celebrex  Vioxx    POST-OP ORDERS: ENDOSCOPY & COLONOSCOPY:    1. Rest today.    2. DO NOT eat or drink until wide awake; eat your usual diet today in moderate amount only.    3. DO NOT drive today.    4. Call physician if you have severe pain, vomiting, fever, rectal bleeding or black bowel movements.    5.  If a biopsy was taken or a polyp removed, you should expect to hear results in about 21 days.  If you have heard nothing from your physician by

## 2024-07-30 NOTE — H&P
Patient Name: Ally Medina  : 1955  MRN: [id number]  DATE: 24    Allergies:   Allergies   Allergen Reactions    Penicillins Hives and Itching    Other Diarrhea and Nausea Only     Sweating      Bicitra        ENDOSCOPY  History and Physical    Procedure:    [] Diagnostic Colonoscopy       [] Screening Colonoscopy  [x] EGD      [] ERCP      [] EUS       [] Other    [x] Previous office notes/History and Physical reviewed from the patients chart. Please see EMR for further details of HPI. I have examined the patient's status immediately prior to the procedure and:      Indications/HPI:        1. Iron deficiency anemia secondary to inadequate dietary iron intake          2. Abnormal liver function tests          3. Nonalcoholic fatty liver disease          4. History of cholelithiasis          5. FHx: cholecystectomy          6. Acquired dilation of common bile duct          7. History of Moose-en-Y gastric bypass          8. History of macrocytic anemia          9. History of bilateral breast cancer          10. Hx of colonic polyps          11. History of partial colectomy       []Abdominal Pain   []Cancer- GI/Lung     []Fhx of colon CA/polyps  []History of Polyps  []Barretts            []Melena  []Abnormal Imaging              []Dysphagia              []Persistent Pneumonia   []Anemia                            []Food Impaction        []History of Polyps  [] GI Bleed             []Pulmonary nodule/Mass   []Change in bowel habits []Heartburn/Reflux  []Rectal Bleed (BRBPR)  []Chest Pain - Non Cardiac []Heme (+) Stool []Ulcers  []Constipation  []Hemoptysis  []Varices  []Diarrhea  []Hypoxemia    []Nausea/Vomiting   []Screening   []Crohns/Colitis  []Other:     Anesthesia:   [x] MAC [] Moderate Sedation   [] General   [] None     ROS: 12 pt Review of Symptoms was negative unless mentioned above    Medications:   Prior to Admission medications    Medication Sig Start Date End Date Taking? Authorizing

## 2024-07-30 NOTE — TELEPHONE ENCOUNTER
7.30.24  Called patient to let her know about lab work and MRI. Transferred patient to scheduling department to get MRI schedule. Patient voiced understanding. Said she can get lab work done when she get MRi

## 2024-07-30 NOTE — TELEPHONE ENCOUNTER
7.30.24  Per Dr Sexton op note    RECOMMENDATIONS:    1.  Await path results, the patient will be contacted in 7-10 days with biopsy results.   2.  Colonoscopy next year for colon cancer screening; sooner if her anemia were to worsen or if she were to have symptoms such as  bleeding, abdominal pain, change in bowel habits or stool caliber or if the patient has anemia or unexplained weight loss in the future.   3. - Resume previous meds and diet     4.  Monitor CBC periodically and check serum iron studies and vitamin B12 level in 2-3 months     5. GI clinic f/u in 8 to 10 weeks weeks with Ms. Sanchez or Ms. Seymour; will need an MRI with MRCP at that time for follow-up of her dilated common bile duct and abnormal LFTs.       Patient has an appointment schedule for 10-1-24 at 11:00am. Do you want patient appointment moved up or do you want to go ahead and do the MRI with MRCP now or wait until closer to time. Do you want blood work before patients appointment too. Please put order in. Thank you       Routed to Mark CHANEL

## 2024-07-30 NOTE — OP NOTE
Endoscopic Procedure Note    Patient: Ally Medina : 1955  [facility] Rec#: [id number] Acc#: [id number]     Primary Care Provider Lana Urrutia APRN - NP    Endoscopist: Bella Sexton MD, MD    Date of Procedure:  2024    Procedure:   EGD with cold biopsies    Indications:   1. Iron deficiency anemia secondary to inadequate dietary iron intake          2. Abnormal liver function tests          3. Nonalcoholic fatty liver disease          4. History of cholelithiasis          5. FHx: cholecystectomy          6. Acquired dilation of common bile duct          7. History of Moose-en-Y gastric bypass          8. History of macrocytic anemia          9. History of bilateral breast cancer          10. Hx of colonic polyps          11. History of partial colectomy         Anesthesia:  Sedation was administered by anesthesia who monitored the patient during the procedure.    Estimated Blood Loss: minimal    Procedure:   After reviewing the patient's chart and obtaining informed consent, the patient was placed in the left lateral decubitus position.  A forward-viewing Olympus endoscope was lubricated and inserted through the mouth into the oropharynx. Under direct visualization, the upper esophagus was intubated. The scope was advanced to the level of the efferent jejunal loop for up to 10 cm from the GJ anastomosis. Scope was slowly withdrawn with careful inspection of the mucosal surfaces. The scope was retroflexed for inspection of the gastric fundus and incisura. Findings and maneuvers are listed in impression below. The patient tolerated the procedure well. The scope was removed. There were no immediate complications.    Findings/IMPRESSION:  Esophagus: normal and a normal EG junction at 36 cm.    NO erosions or ulcers or nodules or strictures or webs or rings or mass lesions or extrinsic compression or diverticula noted.     There is a small 3 cm in length hiatal hernia present.

## 2024-07-30 NOTE — ANESTHESIA POSTPROCEDURE EVALUATION
Department of Anesthesiology  Postprocedure Note    Patient: Ally Medina  MRN: [id number]  YOB: 1955  Date of evaluation: 7/30/2024    Procedure Summary       Date: 07/30/24 Room / Location: [place] [address] / [facility]    Anesthesia Start: 0914 Anesthesia Stop: 0927    Procedure: ESOPHAGOGASTRODUODENOSCOPY BIOPSY (Esophagus) Diagnosis:       Abnormal LFTs      Hx of gallstones      Anemia, unspecified type      (Abnormal LFTs [R79.89])      (Hx of gallstones [Z87.19])      (Anemia, unspecified type [D64.9])    Surgeons: Bella Sexton MD Responsible Provider: Margareth Ferraro APRN - CRNA    Anesthesia Type: general, TIVA ASA Status: 2            Anesthesia Type: No value filed.    Dominga Phase I:      Dominga Phase II:      Anesthesia Post Evaluation    Patient location during evaluation: bedside  Patient participation: complete - patient participated  Level of consciousness: sleepy but conscious  Pain score: 0  Airway patency: patent  Nausea & Vomiting: no nausea and no vomiting  Cardiovascular status: hemodynamically stable  Respiratory status: acceptable  Hydration status: stable  Pain management: adequate    No notable events documented.

## 2024-07-30 NOTE — ANESTHESIA PRE PROCEDURE
Infiltrating ductal carcinoma (HCC) C50.919   • Iron deficiency anemia secondary to inadequate dietary iron intake D50.8       Past Medical History:        Diagnosis Date   • Basal cell adenocarcinoma    • Breast cancer (HCC) 2002    7x   • History of bilateral mastectomy 2016 2022 another mastectomy on right breast   • Hypertension        Past Surgical History:        Procedure Laterality Date   • BREAST LUMPECTOMY Right 04/24/2018   • BREAST LUMPECTOMY Bilateral 2002   • COLON SURGERY  2020    12inches removed   • COLONOSCOPY  2020    polyps per pt   • GASTRIC BYPASS SURGERY  2008   • UPPER GASTROINTESTINAL ENDOSCOPY  2009    Normal per pt       Social History:    Social History     Tobacco Use   • Smoking status: Never   • Smokeless tobacco: Never   Substance Use Topics   • Alcohol use: Yes     Alcohol/week: 1.0 standard drink of alcohol     Types: 1 Glasses of wine per week     Comment: 1-2 drink monthly                                Counseling given: Not Answered      Vital Signs (Current):   Vitals:    07/30/24 0855   BP: (!) 143/77   Pulse: 72   Resp: 18   Temp: 97.7 °F (36.5 °C)   TempSrc: Temporal   SpO2: 97%   Weight: 88.9 kg (196 lb)   Height: 1.6 m (5' 3\")                                              BP Readings from Last 3 Encounters:   07/30/24 (!) 143/77   07/01/24 128/70   07/01/24 132/84       NPO Status: Time of last liquid consumption: 0630 (sip of water with med)                        Time of last solid consumption: 1900                        Date of last liquid consumption: 07/30/24                        Date of last solid food consumption: 07/29/24    BMI:   Wt Readings from Last 3 Encounters:   07/30/24 88.9 kg (196 lb)   07/01/24 88.9 kg (196 lb)   07/01/24 87.1 kg (192 lb)     Body mass index is 34.72 kg/m².    CBC:   Lab Results   Component Value Date/Time    WBC 5.16 06/21/2024 10:31 AM    RBC 3.91 06/21/2024 10:31 AM    HGB 13.7 06/21/2024 10:31 AM    HCT 39.7 06/21/2024 10:31 AM

## 2024-07-31 DIAGNOSIS — E61.1 IRON DEFICIENCY: ICD-10-CM

## 2024-07-31 DIAGNOSIS — C50.919 INFILTRATING DUCTAL CARCINOMA (HCC): Primary | ICD-10-CM

## 2024-07-31 DIAGNOSIS — Z79.818 USE OF FULVESTRANT (FASLODEX): ICD-10-CM

## 2024-08-01 ENCOUNTER — HOSPITAL ENCOUNTER (OUTPATIENT)
Dept: INFUSION THERAPY | Age: 69
Discharge: HOME | End: 2024-08-01
Payer: MEDICARE

## 2024-08-01 ENCOUNTER — OFFICE VISIT (OUTPATIENT)
Dept: HEMATOLOGY | Age: 69
End: 2024-08-01

## 2024-08-01 VITALS
SYSTOLIC BLOOD PRESSURE: 118 MMHG | HEART RATE: 68 BPM | DIASTOLIC BLOOD PRESSURE: 68 MMHG | WEIGHT: 187.8 LBS | BODY MASS INDEX: 33.27 KG/M2 | OXYGEN SATURATION: 98 % | TEMPERATURE: 98 F | HEIGHT: 63 IN

## 2024-08-01 DIAGNOSIS — D50.8 OTHER IRON DEFICIENCY ANEMIA: ICD-10-CM

## 2024-08-01 DIAGNOSIS — Z71.89 CARE PLAN DISCUSSED WITH PATIENT: ICD-10-CM

## 2024-08-01 DIAGNOSIS — Z79.818 USE OF FULVESTRANT (FASLODEX): ICD-10-CM

## 2024-08-01 DIAGNOSIS — C50.919 INFILTRATING DUCTAL CARCINOMA (HCC): Primary | ICD-10-CM

## 2024-08-01 DIAGNOSIS — R74.8 ELEVATED LIVER ENZYMES: ICD-10-CM

## 2024-08-01 DIAGNOSIS — C50.919 INFILTRATING DUCTAL CARCINOMA (HCC): ICD-10-CM

## 2024-08-01 DIAGNOSIS — E61.1 IRON DEFICIENCY: ICD-10-CM

## 2024-08-01 DIAGNOSIS — T38.6X5A ADVERSE EFFECT OF TAMOXIFEN, INITIAL ENCOUNTER: ICD-10-CM

## 2024-08-01 LAB
ALBUMIN SERPL-MCNC: 4 G/DL (ref 3.5–5.2)
ALP SERPL-CCNC: 417 U/L (ref 35–104)
ALT SERPL-CCNC: 95 U/L (ref 5–33)
ANION GAP SERPL CALCULATED.3IONS-SCNC: 10 MMOL/L (ref 7–19)
AST SERPL-CCNC: 92 U/L (ref 5–32)
BASOPHILS # BLD: 0.03 K/UL (ref 0.01–0.08)
BASOPHILS NFR BLD: 0.6 % (ref 0.1–1.2)
BILIRUB SERPL-MCNC: 0.5 MG/DL (ref 0–1.2)
BUN SERPL-MCNC: 7 MG/DL (ref 8–23)
CALCIUM SERPL-MCNC: 9.2 MG/DL (ref 8.8–10.2)
CHLORIDE SERPL-SCNC: 99 MMOL/L (ref 98–107)
CO2 SERPL-SCNC: 27 MMOL/L (ref 22–29)
CREAT SERPL-MCNC: 0.7 MG/DL (ref 0.5–0.9)
EOSINOPHIL # BLD: 0.11 K/UL (ref 0.04–0.54)
EOSINOPHIL NFR BLD: 2.2 % (ref 0.7–7)
ERYTHROCYTE [DISTWIDTH] IN BLOOD BY AUTOMATED COUNT: 12.5 % (ref 11.7–14.4)
FERRITIN SERPL-MCNC: 275.8 NG/ML (ref 13–150)
GLUCOSE SERPL-MCNC: 99 MG/DL (ref 70–99)
HCT VFR BLD AUTO: 39.4 % (ref 34.1–44.9)
HGB BLD-MCNC: 13.5 G/DL (ref 11.2–15.7)
IRON SATN MFR SERPL: 40 % (ref 14–50)
IRON SERPL-MCNC: 112 UG/DL (ref 37–145)
LYMPHOCYTES # BLD: 1.5 K/UL (ref 1.18–3.74)
LYMPHOCYTES NFR BLD: 30 % (ref 19.3–53.1)
MCH RBC QN AUTO: 33.6 PG (ref 25.6–32.2)
MCHC RBC AUTO-ENTMCNC: 34.3 G/DL (ref 32.3–35.5)
MCV RBC AUTO: 98 FL (ref 79.4–94.8)
MONOCYTES # BLD: 0.42 K/UL (ref 0.24–0.82)
MONOCYTES NFR BLD: 8.4 % (ref 4.7–12.5)
NEUTROPHILS # BLD: 2.93 K/UL (ref 1.56–6.13)
NEUTS SEG NFR BLD: 58.6 % (ref 34–71.1)
PLATELET # BLD AUTO: 302 K/UL (ref 182–369)
PMV BLD AUTO: 9.5 FL (ref 7.4–10.4)
POTASSIUM SERPL-SCNC: 4.4 MMOL/L (ref 3.5–5.1)
PROT SERPL-MCNC: 7.5 G/DL (ref 6.4–8.3)
RBC # BLD AUTO: 4.02 M/UL (ref 3.93–5.22)
SODIUM SERPL-SCNC: 136 MMOL/L (ref 136–145)
TIBC SERPL-MCNC: 282 UG/DL (ref 250–400)
WBC # BLD AUTO: 5 K/UL (ref 3.98–10.04)

## 2024-08-01 PROCEDURE — 80053 COMPREHEN METABOLIC PANEL: CPT

## 2024-08-01 PROCEDURE — 6360000002 HC RX W HCPCS

## 2024-08-01 PROCEDURE — 96402 CHEMO HORMON ANTINEOPL SQ/IM: CPT

## 2024-08-01 PROCEDURE — 36415 COLL VENOUS BLD VENIPUNCTURE: CPT

## 2024-08-01 PROCEDURE — 96401 CHEMO ANTI-NEOPL SQ/IM: CPT

## 2024-08-01 PROCEDURE — 85025 COMPLETE CBC W/AUTO DIFF WBC: CPT

## 2024-08-01 PROCEDURE — 99213 OFFICE O/P EST LOW 20 MIN: CPT

## 2024-08-01 RX ORDER — LAMOTRIGINE 25 MG/1
500 TABLET ORAL ONCE
Status: COMPLETED | OUTPATIENT
Start: 2024-08-01 | End: 2024-08-01

## 2024-08-01 RX ORDER — TAMOXIFEN CITRATE 10 MG/1
10 TABLET ORAL DAILY
Qty: 30 TABLET | Refills: 0 | Status: SHIPPED | OUTPATIENT
Start: 2024-08-01 | End: 2024-08-31

## 2024-08-01 RX ADMIN — FULVESTRANT 500 MG: 50 INJECTION, SOLUTION INTRAMUSCULAR at 10:19

## 2024-08-27 ENCOUNTER — TELEPHONE (OUTPATIENT)
Dept: HEMATOLOGY | Age: 69
End: 2024-08-27

## 2024-08-27 NOTE — TELEPHONE ENCOUNTER
I called patient and reminded patient of their appt on 08/29/24 and patient confirmed they would be here. I also let patient know that we have moved into our new cancer facility and asked patient if they were aware of where we were now located, and patient voiced understanding of our new location. Patient knows not to arrive early and that we will get labs at the time of the follow up appointment and not the lab appointment time.

## 2024-08-28 DIAGNOSIS — C50.919 INFILTRATING DUCTAL CARCINOMA (HCC): ICD-10-CM

## 2024-08-28 DIAGNOSIS — Z79.818 USE OF FULVESTRANT (FASLODEX): ICD-10-CM

## 2024-08-28 DIAGNOSIS — C50.919 INFILTRATING DUCTAL CARCINOMA (HCC): Primary | ICD-10-CM

## 2024-08-28 RX ORDER — TAMOXIFEN CITRATE 10 MG/1
10 TABLET ORAL DAILY
Qty: 30 TABLET | Refills: 0 | Status: SHIPPED | OUTPATIENT
Start: 2024-08-28 | End: 2024-08-30 | Stop reason: SDUPTHER

## 2024-08-28 NOTE — PROGRESS NOTES
MEDICAL ONCOLOGY PROGRESS NOTE    Pt Name: Ally Medina  MRN: [id number]  YOB: 1955  Date of evaluation: 8/29/2024    HISTORY OF PRESENT ILLNESS:    Reason for MD visit-toxicity assessment/disease management  The patient is a pleasant 68 years old female who has a diagnosis of local recurrence of her breast cancer status postmastectomy.  She is status post capsulectomy and resection of the breast nodules.  Pathology consistent with residual multicentric tumor. She is status post completion of adjuvant radiation. She had an upper endoscopy with Dr. Granados.  She is currently being followed by GI for abnormalities.  She is tolerating tamoxifen 10 mg PO daily well.  She has been tolerating treatment relatively well.  She denies any significant complaints.  She denies any new breast complaints    Diagnosis  Invasive lobular carcinoma, left breast cancer 2002  Tubular carcinoma, right breast, 2006  Low grade  Infiltrating ductal carcinoma, right breast, March 2016  Grade 1  ER 99%, VT 91%, HER-2 2+/equivocal, FISH-negative  Infiltrating ductal carcinoma, right breast, March 2018  Grade 1  ER 90.86%, VT 89.29%, HER-2 1+/negative  Onctoype DX recurrence score: 4, 2018  Infiltrating ductal carcinoma, right breast, Feb 2021  Grade 1   ER 98%, %, HER-2 1+/negative.   BRCA negative, 2018  (A) ER 50.95%,VT 74.77%,HER2 1+ Negative; (B) ER 41.80,VT 78.95 HER2 1+ Negative  Meghan 81 gene genetic panel: Negative    Treatment Summary  2002 Left lumpectomy  2002 MammoSite radiation therapy  2002 Tamoxifen, Arimidex tried, but unable to tolerate adjuvant estrogen blocking therapy  2007 - 2012 Faslodex x5 years  4/25/16 Bilateral skin sparing mastectomy with right negative SLNB  2016 Fareston, Aromasin  4/24/18 Right breast lumpectomy  May 2018 - Aug 2019 Arimidex  Aug 2019 - March 2020 Tamoxifen x3 months. Discontinued due to side effects  Feb 2021 Letrozole-unable to take due to side effects  1/11/22  Dr.Joshua Sosa/[facility], 6/4/25  Continue follow-up with Dr. Bolanos/[facility]  Will request Bone Density report from [facility]    Follow Up:  Return in about 4 months (around 12/29/2024) for CBC, CMP, Appointment with Dr. Brunson.  Data Unavailable    Digna AGUILERA am pre charting  as Medical Assistant for Marty Brunson MD. Electronically signed by Digna Avila MA on 8/29/2024 at 2:06 PM CDT.  I have seen, examined and reviewed this patient medication list, appropriate labs and imaging studies. I reviewed relevant medical records and others physician’s notes. I discussed the plans of care with the patient. I answered all the questions to the patient’s satisfaction. I have also reviewed the chief complaint (CC) and part of the history (History of Present Illness (HPI), Past Family Social History (PFSH), or Review of Systems (ROS) and made changes when appropriated.       (Please note that portions of this note were completed with a voice recognition program. Efforts were made to edit the dictations but occasionally words are mis-transcribed.)Electronically signed by Marty Brunson MD on 8/29/2024 at 10:49 AM

## 2024-08-29 ENCOUNTER — HOSPITAL ENCOUNTER (OUTPATIENT)
Dept: INFUSION THERAPY | Age: 69
Discharge: HOME | End: 2024-08-29
Payer: MEDICARE

## 2024-08-29 ENCOUNTER — OFFICE VISIT (OUTPATIENT)
Dept: HEMATOLOGY | Age: 69
End: 2024-08-29
Payer: MEDICARE

## 2024-08-29 VITALS
WEIGHT: 183.1 LBS | OXYGEN SATURATION: 96 % | SYSTOLIC BLOOD PRESSURE: 120 MMHG | HEART RATE: 70 BPM | DIASTOLIC BLOOD PRESSURE: 82 MMHG | HEIGHT: 63 IN | BODY MASS INDEX: 32.44 KG/M2 | TEMPERATURE: 97.4 F

## 2024-08-29 DIAGNOSIS — C50.919 INFILTRATING DUCTAL CARCINOMA (HCC): Primary | ICD-10-CM

## 2024-08-29 DIAGNOSIS — Z79.818 USE OF FULVESTRANT (FASLODEX): ICD-10-CM

## 2024-08-29 DIAGNOSIS — Z71.89 CARE PLAN DISCUSSED WITH PATIENT: ICD-10-CM

## 2024-08-29 DIAGNOSIS — R79.89 ABNORMAL LFTS: ICD-10-CM

## 2024-08-29 LAB
ALBUMIN SERPL-MCNC: 4.1 G/DL (ref 3.5–5.2)
ALP SERPL-CCNC: 199 U/L (ref 35–104)
ALT SERPL-CCNC: 51 U/L (ref 5–33)
ANION GAP SERPL CALCULATED.3IONS-SCNC: 8 MMOL/L (ref 7–19)
AST SERPL-CCNC: 64 U/L (ref 5–32)
BASOPHILS # BLD: 0.02 K/UL (ref 0.01–0.08)
BASOPHILS NFR BLD: 0.5 % (ref 0.1–1.2)
BILIRUB SERPL-MCNC: 0.6 MG/DL (ref 0–1.2)
BUN SERPL-MCNC: 9 MG/DL (ref 8–23)
CALCIUM SERPL-MCNC: 9.6 MG/DL (ref 8.8–10.2)
CHLORIDE SERPL-SCNC: 104 MMOL/L (ref 98–107)
CO2 SERPL-SCNC: 28 MMOL/L (ref 22–29)
CREAT SERPL-MCNC: 0.8 MG/DL (ref 0.5–0.9)
EOSINOPHIL # BLD: 0.07 K/UL (ref 0.04–0.54)
EOSINOPHIL NFR BLD: 1.6 % (ref 0.7–7)
ERYTHROCYTE [DISTWIDTH] IN BLOOD BY AUTOMATED COUNT: 12.7 % (ref 11.7–14.4)
GLUCOSE SERPL-MCNC: 107 MG/DL (ref 70–99)
HCT VFR BLD AUTO: 37.1 % (ref 34.1–44.9)
HGB BLD-MCNC: 12.8 G/DL (ref 11.2–15.7)
LYMPHOCYTES # BLD: 1.26 K/UL (ref 1.18–3.74)
LYMPHOCYTES NFR BLD: 29.4 % (ref 19.3–53.1)
MCH RBC QN AUTO: 34.1 PG (ref 25.6–32.2)
MCHC RBC AUTO-ENTMCNC: 34.5 G/DL (ref 32.3–35.5)
MCV RBC AUTO: 98.9 FL (ref 79.4–94.8)
MONOCYTES # BLD: 0.31 K/UL (ref 0.24–0.82)
MONOCYTES NFR BLD: 7.2 % (ref 4.7–12.5)
NEUTROPHILS # BLD: 2.61 K/UL (ref 1.56–6.13)
NEUTS SEG NFR BLD: 61.1 % (ref 34–71.1)
PLATELET # BLD AUTO: 278 K/UL (ref 182–369)
PMV BLD AUTO: 9.8 FL (ref 7.4–10.4)
POTASSIUM SERPL-SCNC: 4.3 MMOL/L (ref 3.5–5.1)
PROT SERPL-MCNC: 7.3 G/DL (ref 6.4–8.3)
RBC # BLD AUTO: 3.75 M/UL (ref 3.93–5.22)
SODIUM SERPL-SCNC: 140 MMOL/L (ref 136–145)
WBC # BLD AUTO: 4.28 K/UL (ref 3.98–10.04)

## 2024-08-29 PROCEDURE — 1036F TOBACCO NON-USER: CPT | Performed by: INTERNAL MEDICINE

## 2024-08-29 PROCEDURE — G2211 COMPLEX E/M VISIT ADD ON: HCPCS | Performed by: INTERNAL MEDICINE

## 2024-08-29 PROCEDURE — 36415 COLL VENOUS BLD VENIPUNCTURE: CPT

## 2024-08-29 PROCEDURE — G8400 PT W/DXA NO RESULTS DOC: HCPCS | Performed by: INTERNAL MEDICINE

## 2024-08-29 PROCEDURE — 3017F COLORECTAL CA SCREEN DOC REV: CPT | Performed by: INTERNAL MEDICINE

## 2024-08-29 PROCEDURE — G8427 DOCREV CUR MEDS BY ELIG CLIN: HCPCS | Performed by: INTERNAL MEDICINE

## 2024-08-29 PROCEDURE — 80053 COMPREHEN METABOLIC PANEL: CPT

## 2024-08-29 PROCEDURE — 1090F PRES/ABSN URINE INCON ASSESS: CPT | Performed by: INTERNAL MEDICINE

## 2024-08-29 PROCEDURE — 99213 OFFICE O/P EST LOW 20 MIN: CPT | Performed by: INTERNAL MEDICINE

## 2024-08-29 PROCEDURE — 85025 COMPLETE CBC W/AUTO DIFF WBC: CPT

## 2024-08-29 PROCEDURE — 99212 OFFICE O/P EST SF 10 MIN: CPT

## 2024-08-29 PROCEDURE — 1123F ACP DISCUSS/DSCN MKR DOCD: CPT | Performed by: INTERNAL MEDICINE

## 2024-08-29 PROCEDURE — G8417 CALC BMI ABV UP PARAM F/U: HCPCS | Performed by: INTERNAL MEDICINE

## 2024-08-29 RX ORDER — LAMOTRIGINE 25 MG/1
500 TABLET ORAL ONCE
Status: DISCONTINUED | OUTPATIENT
Start: 2024-08-29 | End: 2024-08-29

## 2024-08-30 DIAGNOSIS — C50.919 INFILTRATING DUCTAL CARCINOMA (HCC): ICD-10-CM

## 2024-08-30 RX ORDER — TAMOXIFEN CITRATE 10 MG/1
10 TABLET ORAL DAILY
Qty: 90 TABLET | Refills: 1 | Status: SHIPPED | OUTPATIENT
Start: 2024-08-30

## 2024-09-17 ENCOUNTER — HOSPITAL ENCOUNTER (OUTPATIENT)
Dept: MRI IMAGING | Age: 69
Discharge: HOME | End: 2024-09-17
Payer: MEDICARE

## 2024-09-17 ENCOUNTER — OFFICE VISIT (OUTPATIENT)
Dept: CARDIOLOGY | Facility: CLINIC | Age: 69
End: 2024-09-17
Payer: MEDICARE

## 2024-09-17 VITALS
HEIGHT: 63 IN | WEIGHT: 188 LBS | HEART RATE: 89 BPM | OXYGEN SATURATION: 99 % | SYSTOLIC BLOOD PRESSURE: 124 MMHG | DIASTOLIC BLOOD PRESSURE: 80 MMHG | BODY MASS INDEX: 33.31 KG/M2

## 2024-09-17 DIAGNOSIS — I10 PRIMARY HYPERTENSION: Primary | ICD-10-CM

## 2024-09-17 DIAGNOSIS — K83.8 ACQUIRED DILATION OF COMMON BILE DUCT: ICD-10-CM

## 2024-09-17 DIAGNOSIS — E66.09 CLASS 1 OBESITY DUE TO EXCESS CALORIES WITH SERIOUS COMORBIDITY AND BODY MASS INDEX (BMI) OF 33.0 TO 33.9 IN ADULT: ICD-10-CM

## 2024-09-17 DIAGNOSIS — R74.8 ELEVATED LIVER ENZYMES: ICD-10-CM

## 2024-09-17 PROCEDURE — 99214 OFFICE O/P EST MOD 30 MIN: CPT | Performed by: NURSE PRACTITIONER

## 2024-09-17 PROCEDURE — 6360000004 HC RX CONTRAST MEDICATION: Performed by: NURSE PRACTITIONER

## 2024-09-17 PROCEDURE — 3074F SYST BP LT 130 MM HG: CPT | Performed by: NURSE PRACTITIONER

## 2024-09-17 PROCEDURE — 1159F MED LIST DOCD IN RCRD: CPT | Performed by: NURSE PRACTITIONER

## 2024-09-17 PROCEDURE — A9577 INJ MULTIHANCE: HCPCS | Performed by: NURSE PRACTITIONER

## 2024-09-17 PROCEDURE — 1160F RVW MEDS BY RX/DR IN RCRD: CPT | Performed by: NURSE PRACTITIONER

## 2024-09-17 PROCEDURE — 74183 MRI ABD W/O CNTR FLWD CNTR: CPT

## 2024-09-17 PROCEDURE — 3079F DIAST BP 80-89 MM HG: CPT | Performed by: NURSE PRACTITIONER

## 2024-09-17 PROCEDURE — 93000 ELECTROCARDIOGRAM COMPLETE: CPT | Performed by: NURSE PRACTITIONER

## 2024-09-17 RX ORDER — HYDROCHLOROTHIAZIDE 12.5 MG/1
12.5 TABLET ORAL DAILY
Qty: 30 TABLET | Refills: 11 | Status: SHIPPED | OUTPATIENT
Start: 2024-09-17 | End: 2024-09-17 | Stop reason: SDUPTHER

## 2024-09-17 RX ORDER — TAMOXIFEN CITRATE 10 MG/1
1 TABLET ORAL DAILY
COMMUNITY
Start: 2024-08-30

## 2024-09-17 RX ORDER — HYDROCHLOROTHIAZIDE 12.5 MG/1
12.5 TABLET ORAL DAILY
Qty: 90 TABLET | Refills: 3 | Status: SHIPPED | OUTPATIENT
Start: 2024-09-17

## 2024-09-17 RX ADMIN — GADOBENATE DIMEGLUMINE 17 ML: 529 INJECTION, SOLUTION INTRAVENOUS at 10:32

## 2024-09-23 DIAGNOSIS — M81.0 AGE-RELATED OSTEOPOROSIS WITHOUT CURRENT PATHOLOGICAL FRACTURE: Primary | ICD-10-CM

## 2024-09-23 RX ORDER — ACETAMINOPHEN 325 MG/1
650 TABLET ORAL
OUTPATIENT
Start: 2024-09-30

## 2024-09-23 RX ORDER — ALBUTEROL SULFATE 90 UG/1
4 INHALANT RESPIRATORY (INHALATION) PRN
OUTPATIENT
Start: 2024-09-30

## 2024-09-23 RX ORDER — FAMOTIDINE 10 MG/ML
20 INJECTION, SOLUTION INTRAVENOUS
OUTPATIENT
Start: 2024-09-30

## 2024-09-23 RX ORDER — ONDANSETRON 2 MG/ML
8 INJECTION INTRAMUSCULAR; INTRAVENOUS
OUTPATIENT
Start: 2024-09-30

## 2024-09-23 RX ORDER — SODIUM CHLORIDE 9 MG/ML
INJECTION, SOLUTION INTRAVENOUS CONTINUOUS
OUTPATIENT
Start: 2024-09-30

## 2024-09-23 RX ORDER — DIPHENHYDRAMINE HYDROCHLORIDE 50 MG/ML
50 INJECTION INTRAMUSCULAR; INTRAVENOUS
OUTPATIENT
Start: 2024-09-30

## 2024-09-23 RX ORDER — EPINEPHRINE 1 MG/ML
0.3 INJECTION, SOLUTION, CONCENTRATE INTRAVENOUS PRN
OUTPATIENT
Start: 2024-09-30

## 2024-09-24 ENCOUNTER — TELEPHONE (OUTPATIENT)
Dept: CARDIOLOGY | Facility: CLINIC | Age: 69
End: 2024-09-24
Payer: MEDICARE

## 2024-09-25 RX ORDER — HYDROCHLOROTHIAZIDE 25 MG/1
25 TABLET ORAL DAILY
Qty: 90 TABLET | Refills: 3 | Status: SHIPPED | OUTPATIENT
Start: 2024-09-25

## 2024-10-01 ENCOUNTER — HOSPITAL ENCOUNTER (OUTPATIENT)
Dept: INFUSION THERAPY | Age: 69
Discharge: HOME | End: 2024-10-01
Payer: MEDICARE

## 2024-10-01 ENCOUNTER — OFFICE VISIT (OUTPATIENT)
Dept: GASTROENTEROLOGY | Age: 69
End: 2024-10-01
Payer: MEDICARE

## 2024-10-01 VITALS
OXYGEN SATURATION: 98 % | SYSTOLIC BLOOD PRESSURE: 120 MMHG | BODY MASS INDEX: 32.96 KG/M2 | WEIGHT: 186 LBS | HEART RATE: 75 BPM | DIASTOLIC BLOOD PRESSURE: 80 MMHG | HEIGHT: 63 IN

## 2024-10-01 DIAGNOSIS — M81.0 AGE-RELATED OSTEOPOROSIS WITHOUT CURRENT PATHOLOGICAL FRACTURE: Primary | ICD-10-CM

## 2024-10-01 DIAGNOSIS — R74.8 ELEVATED LIVER ENZYMES: Primary | ICD-10-CM

## 2024-10-01 DIAGNOSIS — C50.919 INFILTRATING DUCTAL CARCINOMA (HCC): ICD-10-CM

## 2024-10-01 DIAGNOSIS — C50.919 INFILTRATING DUCTAL CARCINOMA (HCC): Primary | ICD-10-CM

## 2024-10-01 LAB
ALBUMIN SERPL-MCNC: 4.2 G/DL (ref 3.5–5.2)
ALP SERPL-CCNC: 260 U/L (ref 35–104)
ALT SERPL-CCNC: 68 U/L (ref 5–33)
ANION GAP SERPL CALCULATED.3IONS-SCNC: 11 MMOL/L (ref 7–19)
AST SERPL-CCNC: 74 U/L (ref 5–32)
BASOPHILS # BLD: 0.04 K/UL (ref 0.01–0.08)
BASOPHILS NFR BLD: 0.6 % (ref 0.1–1.2)
BILIRUB SERPL-MCNC: 0.5 MG/DL (ref 0–1.2)
BUN SERPL-MCNC: 20 MG/DL (ref 8–23)
CALCIUM SERPL-MCNC: 9.2 MG/DL (ref 8.8–10.2)
CHLORIDE SERPL-SCNC: 101 MMOL/L (ref 98–107)
CO2 SERPL-SCNC: 26 MMOL/L (ref 22–29)
CREAT SERPL-MCNC: 1 MG/DL (ref 0.5–0.9)
EOSINOPHIL # BLD: 0.07 K/UL (ref 0.04–0.54)
EOSINOPHIL NFR BLD: 1 % (ref 0.7–7)
ERYTHROCYTE [DISTWIDTH] IN BLOOD BY AUTOMATED COUNT: 13.3 % (ref 11.7–14.4)
GLUCOSE SERPL-MCNC: 83 MG/DL (ref 70–99)
HCT VFR BLD AUTO: 38.8 % (ref 34.1–44.9)
HGB BLD-MCNC: 13 G/DL (ref 11.2–15.7)
LYMPHOCYTES # BLD: 1.92 K/UL (ref 1.18–3.74)
LYMPHOCYTES NFR BLD: 28.3 % (ref 19.3–53.1)
MCH RBC QN AUTO: 34.6 PG (ref 25.6–32.2)
MCHC RBC AUTO-ENTMCNC: 33.5 G/DL (ref 32.3–35.5)
MCV RBC AUTO: 103.2 FL (ref 79.4–94.8)
MONOCYTES # BLD: 0.6 K/UL (ref 0.24–0.82)
MONOCYTES NFR BLD: 8.8 % (ref 4.7–12.5)
NEUTROPHILS # BLD: 4.14 K/UL (ref 1.56–6.13)
NEUTS SEG NFR BLD: 61.2 % (ref 34–71.1)
PHOSPHATE SERPL-MCNC: 2.8 MG/DL (ref 2.5–4.5)
PLATELET # BLD AUTO: 335 K/UL (ref 182–369)
PMV BLD AUTO: 9.4 FL (ref 7.4–10.4)
POTASSIUM SERPL-SCNC: 4.1 MMOL/L (ref 3.5–5.1)
PROT SERPL-MCNC: 7.7 G/DL (ref 6.4–8.3)
RBC # BLD AUTO: 3.76 M/UL (ref 3.93–5.22)
SODIUM SERPL-SCNC: 138 MMOL/L (ref 136–145)
WBC # BLD AUTO: 6.78 K/UL (ref 3.98–10.04)

## 2024-10-01 PROCEDURE — 3017F COLORECTAL CA SCREEN DOC REV: CPT | Performed by: NURSE PRACTITIONER

## 2024-10-01 PROCEDURE — 84100 ASSAY OF PHOSPHORUS: CPT

## 2024-10-01 PROCEDURE — 99213 OFFICE O/P EST LOW 20 MIN: CPT | Performed by: NURSE PRACTITIONER

## 2024-10-01 PROCEDURE — 80053 COMPREHEN METABOLIC PANEL: CPT

## 2024-10-01 PROCEDURE — G8484 FLU IMMUNIZE NO ADMIN: HCPCS | Performed by: NURSE PRACTITIONER

## 2024-10-01 PROCEDURE — 1123F ACP DISCUSS/DSCN MKR DOCD: CPT | Performed by: NURSE PRACTITIONER

## 2024-10-01 PROCEDURE — G8400 PT W/DXA NO RESULTS DOC: HCPCS | Performed by: NURSE PRACTITIONER

## 2024-10-01 PROCEDURE — 36415 COLL VENOUS BLD VENIPUNCTURE: CPT

## 2024-10-01 PROCEDURE — 6360000002 HC RX W HCPCS: Performed by: INTERNAL MEDICINE

## 2024-10-01 PROCEDURE — 96372 THER/PROPH/DIAG INJ SC/IM: CPT

## 2024-10-01 PROCEDURE — 85025 COMPLETE CBC W/AUTO DIFF WBC: CPT

## 2024-10-01 PROCEDURE — 1036F TOBACCO NON-USER: CPT | Performed by: NURSE PRACTITIONER

## 2024-10-01 PROCEDURE — 1090F PRES/ABSN URINE INCON ASSESS: CPT | Performed by: NURSE PRACTITIONER

## 2024-10-01 PROCEDURE — G8417 CALC BMI ABV UP PARAM F/U: HCPCS | Performed by: NURSE PRACTITIONER

## 2024-10-01 PROCEDURE — G8427 DOCREV CUR MEDS BY ELIG CLIN: HCPCS | Performed by: NURSE PRACTITIONER

## 2024-10-01 RX ORDER — HYDROCHLOROTHIAZIDE 25 MG/1
25 TABLET ORAL DAILY
COMMUNITY
Start: 2024-09-16

## 2024-10-01 RX ORDER — DIPHENHYDRAMINE HYDROCHLORIDE 50 MG/ML
50 INJECTION INTRAMUSCULAR; INTRAVENOUS
OUTPATIENT
Start: 2025-03-30

## 2024-10-01 RX ORDER — SODIUM CHLORIDE 9 MG/ML
INJECTION, SOLUTION INTRAVENOUS CONTINUOUS
OUTPATIENT
Start: 2025-03-30

## 2024-10-01 RX ORDER — ACETAMINOPHEN 325 MG/1
650 TABLET ORAL
OUTPATIENT
Start: 2025-03-30

## 2024-10-01 RX ORDER — ALBUTEROL SULFATE 90 UG/1
4 INHALANT RESPIRATORY (INHALATION) PRN
OUTPATIENT
Start: 2025-03-30

## 2024-10-01 RX ORDER — EPINEPHRINE 1 MG/ML
0.3 INJECTION, SOLUTION, CONCENTRATE INTRAVENOUS PRN
OUTPATIENT
Start: 2025-03-30

## 2024-10-01 RX ORDER — FAMOTIDINE 10 MG/ML
20 INJECTION, SOLUTION INTRAVENOUS
OUTPATIENT
Start: 2025-03-30

## 2024-10-01 RX ORDER — ONDANSETRON 2 MG/ML
8 INJECTION INTRAMUSCULAR; INTRAVENOUS
OUTPATIENT
Start: 2025-03-30

## 2024-10-01 RX ADMIN — DENOSUMAB 60 MG: 60 INJECTION SUBCUTANEOUS at 14:51

## 2024-10-01 NOTE — PROGRESS NOTES
Subjective:     Patient ID: Ally Medina is a 69 y.o. female  PCP: Lana Urrutia APRN - NP  Referring Provider: No ref. provider found    HPI  Patient presents to the office today with the following complaints: 3 Month Follow-Up    Patient seen in the office today for follow up   After her MRCP  I discussed the results of the MRCP with her and answered her questions  Reports she is feeling fine, denies having any pain  She again discussed that she will continue to decline ERCP due to \"terrible pancreatitis\" with each ERCP she has had in the past   MRI Result (most recent):  MRI ABDOMEN W WO CONTRAST MRCP 09/17/2024    Narrative  EXAM:  MRI ABDOMEN AND MRCP WITH AND WITHOUT CONTRAST    TECHNIQUE: Multiplanar multisequence MRI of the abdomen before and after administration of IV contrast.  Thin sliced radial 3-D MRCP was performed.    HISTORY: Elevated LFTs    COMPARISON: MRI 07/01/2024    FINDINGS:    Lung bases:  Clear.    Liver: Normal morphology.  No suspicious hepatic lesions.    Gallbladder: There has been cholecystectomy.    Bile ducts: Redemonstration of marked intrahepatic and extrahepatic biliary dilatation.  Maximum diameter of the common duct is approximately 1.7 cm.  There is abrupt but smooth tapering in the head of the pancreas.  No stone or mass.    Spleen: The spleen is not enlarged.    Pancreas: Normal signal intensity and enhancement.  No solid masses.  The main pancreatic duct is not dilated.    Adrenal glands: Stable left adrenal adenoma.  Normal right adrenal gland.    Kidneys: No hydronephrosis.    Aorta: The major vessels are patent. No Aneurysm.    Bowel: Normal caliber. No obstruction or wall thickening. There is a small hiatal hernia.    Osseous structures: Unremarkable.    Impression  1.  Stable biliary dilatation likely secondary to a combination of previous cholecystectomy and the patient's age.  Component of distal stricture/ampullary stenosis cannot be excluded.  No evidence

## 2024-10-07 ASSESSMENT — ENCOUNTER SYMPTOMS
VOMITING: 0
DIARRHEA: 0
CONSTIPATION: 0
RECTAL PAIN: 0
ANAL BLEEDING: 0
TROUBLE SWALLOWING: 0
ABDOMINAL PAIN: 0
NAUSEA: 0
BLOOD IN STOOL: 0
CHOKING: 0
ABDOMINAL DISTENTION: 0
SHORTNESS OF BREATH: 0
COUGH: 0

## 2024-11-14 NOTE — PROGRESS NOTES
Chief Complaint  Hypertension (2mo F/U. Started HCTZ LOV)    Subjective          Anh Bolanos presents to [facility] for routine follow up of medication adjustment.  Amlodipine was discontinued and she was restarted on hydrochlorothiazide 12.5 mg daily at her last office visit on 9/17/2024. She has hypertension, syncope and obesity. She reports resolution of bilateral lower extremity edema since discontinuing amlodipine. The patient denies chest pain, shortness of breath, palpitations, dizziness, syncope, orthopnea, PND or decreased stamina. The patient denies any signs of bleeding.     Hypertension  This is a chronic problem. The current episode started more than 1 year ago. The problem is controlled. Pertinent negatives include no anxiety, blurred vision, chest pain, headaches, malaise/fatigue, neck pain, orthopnea, palpitations, peripheral edema, PND, shortness of breath or sweats. Risk factors for coronary artery disease include obesity and post-menopausal state. Past treatments include angiotensin blockers, diuretics and calcium channel blockers. Current antihypertension treatment includes beta blockers and diuretics. The current treatment provides significant improvement.     I have reviewed and confirmed the accuracy of the ROS  MACI Posada    Objective     Current Outpatient Medications:     CALCIUM-PHOSPHORUS-VITAMIN D PO, Take 1 tablet by mouth Every Evening., Disp: , Rfl:     Collagen-Vitamin C 1000-10 MG tablet, Take 1 tablet by mouth Daily., Disp: , Rfl:     doxepin (SINEquan) 25 MG capsule, Take 1 capsule by mouth Daily., Disp: , Rfl:     fluocinonide (LIDEX) 0.05 % external solution, Apply 1 application topically to the appropriate area as directed As Needed., Disp: , Rfl:     hydroCHLOROthiazide 25 MG tablet, Take 1 tablet by mouth Daily., Disp: 90 tablet, Rfl: 3    metoprolol succinate XL (TOPROL-XL) 25 MG 24 hr tablet, Take 1 tablet by mouth Daily.,  "Disp: 90 tablet, Rfl: 3    multivitamin with minerals tablet tablet, Take 1 tablet by mouth Daily., Disp: , Rfl:     omeprazole (priLOSEC) 20 MG capsule, Take 1 capsule by mouth Daily., Disp: , Rfl:     tamoxifen (NOLVADEX) 10 MG tablet, Take 1 tablet by mouth Daily., Disp: , Rfl:     vitamin B-12 (CYANOCOBALAMIN) 1000 MCG tablet, Take 1 tablet by mouth 1 (One) Time Per Week., Disp: , Rfl:   Vital Signs:   /77   Pulse 73   Ht 160 cm (63\")   Wt 83.9 kg (185 lb)   SpO2 98%   BMI 32.77 kg/m²     Vitals and nursing note reviewed.   Constitutional:       General: Not in acute distress.     Appearance: Normal and healthy appearance. Well-developed and not in distress. Obese. Not diaphoretic.   Eyes:      General: Lids are normal.         Right eye: No discharge.         Left eye: No discharge.      Conjunctiva/sclera: Conjunctivae normal.      Pupils: Pupils are equal, round, and reactive to light.   HENT:      Head: Normocephalic and atraumatic.      Jaw: There is normal jaw occlusion.      Right Ear: External ear normal.      Left Ear: External ear normal.      Nose: Nose normal.   Neck:      Thyroid: No thyromegaly.      Vascular: No carotid bruit, JVD or JVR. JVD normal.      Trachea: Trachea normal. No tracheal deviation.   Pulmonary:      Effort: Pulmonary effort is normal. No respiratory distress.      Breath sounds: Normal breath sounds. No decreased breath sounds. No wheezing. No rhonchi. No rales.   Chest:      Chest wall: Not tender to palpatation.   Cardiovascular:      PMI at left midclavicular line. Normal rate. Regular rhythm. Normal S1. Normal S2.       Murmurs: There is no murmur.      No gallop.  No click. No rub.   Pulses:     Intact distal pulses. No decreased pulses.   Edema:     Peripheral edema absent.   Abdominal:      General: Bowel sounds are normal. There is no distension.      Palpations: Abdomen is soft.      Tenderness: There is no abdominal tenderness.   Musculoskeletal: Normal " range of motion.         General: No tenderness or deformity.      Cervical back: Normal range of motion and neck supple. Skin:     General: Skin is warm and dry.      Coloration: Skin is not pale.      Findings: No erythema or rash.   Neurological:      General: No focal deficit present.      Mental Status: Alert, oriented to person, place, and time and oriented to person, place and time.   Psychiatric:         Attention and Perception: Attention and perception normal.         Mood and Affect: Mood and affect normal.         Speech: Speech normal.         Behavior: Behavior normal.         Thought Content: Thought content normal.         Cognition and Memory: Cognition and memory normal.         Judgment: Judgment normal.        Result Review :   The following data was reviewed by: MACI Posada on 11/15/2024:  Common labs          8/1/2024    09:45 8/29/2024    09:52 8/29/2024    09:53 10/1/2024    14:39   Common Labs   Glucose 99     107      83       BUN 7     9      20       Creatinine 0.7     0.8      1.0       Sodium 136     140      138       Potassium 4.4     4.3      4.1       Chloride 99     104      101       Calcium 9.2     9.6      9.2       Albumin 4.0     4.1      4.2       Total Bilirubin 0.5     0.6      0.5       Alkaline Phosphatase 417     199      260       AST (SGOT) 92     64      74       ALT (SGPT) 95     51      68       WBC 5.00      4.28     6.78       Hemoglobin 13.5      12.8     13.0       Hematocrit 39.4      37.1     38.8       Platelets 302      278     335          Details          This result is from an external source.             Data reviewed : Cardiology studies holter monitor 2/15/24 and carotid artery ultrasound 3/1/24           Assessment and Plan    Diagnoses and all orders for this visit:    1. Primary hypertension (Primary)- blood pressure is controlled. Continue HCTZ and metoprolol succinate. Monitor and record daily blood pressure. Report readings  consistently higher than 130/80 or consistently lower than 100/60.  Vitals negative for orthostatic hypotension.     2. Class 1 obesity due to excess calories with serious comorbidity and body mass index (BMI) of 32.0 to 32.9 in adult- BMI is >= 30 and <35. (Class 1 Obesity). The following options were offered after discussion;: weight loss educational material (shared in after visit summary).         Follow Up   Return in about 6 months (around 5/15/2025) for Next scheduled follow up.  Patient was given instructions and counseling regarding her condition or for health maintenance advice. Please see specific information pulled into the AVS if appropriate.

## 2024-11-15 ENCOUNTER — OFFICE VISIT (OUTPATIENT)
Dept: CARDIOLOGY | Facility: CLINIC | Age: 69
End: 2024-11-15
Payer: MEDICARE

## 2024-11-15 VITALS
HEIGHT: 63 IN | DIASTOLIC BLOOD PRESSURE: 77 MMHG | HEART RATE: 73 BPM | WEIGHT: 185 LBS | OXYGEN SATURATION: 98 % | SYSTOLIC BLOOD PRESSURE: 122 MMHG | BODY MASS INDEX: 32.78 KG/M2

## 2024-11-15 DIAGNOSIS — E66.811 CLASS 1 OBESITY DUE TO EXCESS CALORIES WITH SERIOUS COMORBIDITY AND BODY MASS INDEX (BMI) OF 32.0 TO 32.9 IN ADULT: ICD-10-CM

## 2024-11-15 DIAGNOSIS — E66.09 CLASS 1 OBESITY DUE TO EXCESS CALORIES WITH SERIOUS COMORBIDITY AND BODY MASS INDEX (BMI) OF 32.0 TO 32.9 IN ADULT: ICD-10-CM

## 2024-11-15 DIAGNOSIS — I10 PRIMARY HYPERTENSION: Primary | ICD-10-CM

## 2024-11-15 PROCEDURE — 1160F RVW MEDS BY RX/DR IN RCRD: CPT | Performed by: NURSE PRACTITIONER

## 2024-11-15 PROCEDURE — 3078F DIAST BP <80 MM HG: CPT | Performed by: NURSE PRACTITIONER

## 2024-11-15 PROCEDURE — 3074F SYST BP LT 130 MM HG: CPT | Performed by: NURSE PRACTITIONER

## 2024-11-15 PROCEDURE — 1159F MED LIST DOCD IN RCRD: CPT | Performed by: NURSE PRACTITIONER

## 2025-01-29 ENCOUNTER — TELEPHONE (OUTPATIENT)
Dept: HEMATOLOGY | Age: 70
End: 2025-01-29

## 2025-01-29 NOTE — TELEPHONE ENCOUNTER

## 2025-01-31 DIAGNOSIS — C50.919 INFILTRATING DUCTAL CARCINOMA (HCC): Primary | ICD-10-CM

## 2025-01-31 NOTE — PROGRESS NOTES
Results   Component Value Date     02/03/2025    K 4.2 02/03/2025     02/03/2025    CO2 27 02/03/2025    BUN 18 02/03/2025    CREATININE 0.9 02/03/2025    GLUCOSE 86 02/03/2025    CALCIUM 9.0 02/03/2025    BILITOT 0.4 02/03/2025    ALKPHOS 143 (H) 02/03/2025    AST 48 (H) 02/03/2025    ALT 32 02/03/2025    LABGLOM 68 02/03/2025    GLOB 3.1 06/21/2024     RADIOLOGY STUDIES REVIEWED BY ME:  9/17/24 MRI Abdomen W WO Contrast Redemonstration of marked intrahepatic and extrahepatic biliary dilatation. Maximum diameter of the common duct is approximately 1.7 cm. Stable biliary dilatation likely secondary to a combination of previous cholecystectomy and the patient's age. Component of distal stricture/ampullary stenosis cannot be excluded. No evidence of obstructing stone or mass.   9/30/24 Bone Density ([facility]) Consider osteoporosis medication, Vitamin  D, and calcium supplementation.Lumbar Spine T-Score -2.1, Left Femoral Neck T-Score -1.7. Repeat examination in 1 year.    ASSESSMENT:    No orders of the defined types were placed in this encounter.    Ally was seen today for cancer.    Diagnoses and all orders for this visit:    Infiltrating ductal carcinoma (HCC)    Care plan discussed with patient    Adverse effect of tamoxifen, subsequent encounter    Abnormal LFTs        Assessment & Plan  1. Breast cancer.  She has been on tamoxifen 10 mg, which she tolerates well without major issues. The plan is to continue tamoxifen until October 2027, provided she remains asymptomatic. If any symptoms arise, the dosage will be reduced to 5 mg. She had a right mastectomy on 10/19/2022 and continues follow-up with her breast surgeon. She is due for an ultrasound and MRI every six months, with the next appointments scheduled in May and June.    2. Osteoporosis.  She receives Prolia injections for osteoporosis, with the next injection due in April. A bone density test was conducted before starting the injections in

## 2025-02-03 ENCOUNTER — OFFICE VISIT (OUTPATIENT)
Dept: HEMATOLOGY | Age: 70
End: 2025-02-03
Payer: MEDICARE

## 2025-02-03 ENCOUNTER — HOSPITAL ENCOUNTER (OUTPATIENT)
Dept: INFUSION THERAPY | Age: 70
Discharge: HOME | End: 2025-02-03
Payer: MEDICARE

## 2025-02-03 VITALS
OXYGEN SATURATION: 98 % | WEIGHT: 190 LBS | BODY MASS INDEX: 33.66 KG/M2 | HEART RATE: 76 BPM | SYSTOLIC BLOOD PRESSURE: 132 MMHG | RESPIRATION RATE: 16 BRPM | DIASTOLIC BLOOD PRESSURE: 76 MMHG

## 2025-02-03 DIAGNOSIS — Z71.89 CARE PLAN DISCUSSED WITH PATIENT: ICD-10-CM

## 2025-02-03 DIAGNOSIS — C50.919 INFILTRATING DUCTAL CARCINOMA (HCC): Primary | ICD-10-CM

## 2025-02-03 DIAGNOSIS — R79.89 ABNORMAL LFTS: ICD-10-CM

## 2025-02-03 DIAGNOSIS — C50.919 INFILTRATING DUCTAL CARCINOMA (HCC): ICD-10-CM

## 2025-02-03 DIAGNOSIS — T38.6X5D ADVERSE EFFECT OF TAMOXIFEN, SUBSEQUENT ENCOUNTER: ICD-10-CM

## 2025-02-03 LAB
ALBUMIN SERPL-MCNC: 4.2 G/DL (ref 3.5–5.2)
ALP SERPL-CCNC: 143 U/L (ref 35–104)
ALT SERPL-CCNC: 32 U/L (ref 5–33)
ANION GAP SERPL CALCULATED.3IONS-SCNC: 11 MMOL/L (ref 7–19)
AST SERPL-CCNC: 48 U/L (ref 5–32)
BASOPHILS # BLD: 0.03 K/UL (ref 0–0.2)
BASOPHILS NFR BLD: 0.5 % (ref 0–1)
BILIRUB SERPL-MCNC: 0.4 MG/DL (ref 0–1.2)
BUN SERPL-MCNC: 18 MG/DL (ref 8–23)
CALCIUM SERPL-MCNC: 9 MG/DL (ref 8.8–10.2)
CHLORIDE SERPL-SCNC: 101 MMOL/L (ref 98–107)
CO2 SERPL-SCNC: 27 MMOL/L (ref 22–29)
CREAT SERPL-MCNC: 0.9 MG/DL (ref 0.5–0.9)
EOSINOPHIL # BLD: 0.08 K/UL (ref 0–0.6)
EOSINOPHIL NFR BLD: 1.2 % (ref 0–5)
ERYTHROCYTE [DISTWIDTH] IN BLOOD BY AUTOMATED COUNT: 12.8 % (ref 11.5–14.5)
GLUCOSE SERPL-MCNC: 86 MG/DL (ref 70–99)
HCT VFR BLD AUTO: 36.8 % (ref 37–47)
HGB BLD-MCNC: 12.4 G/DL (ref 12–16)
LYMPHOCYTES # BLD: 1.82 K/UL (ref 1.1–4.5)
LYMPHOCYTES NFR BLD: 28.3 % (ref 20–40)
MCH RBC QN AUTO: 34.6 PG (ref 27–31)
MCHC RBC AUTO-ENTMCNC: 33.7 G/DL (ref 33–37)
MCV RBC AUTO: 102.8 FL (ref 81–99)
MONOCYTES # BLD: 0.42 K/UL (ref 0–0.9)
MONOCYTES NFR BLD: 6.5 % (ref 1–10)
NEUTROPHILS # BLD: 4.06 K/UL (ref 1.5–7.5)
NEUTS SEG NFR BLD: 63.3 % (ref 50–65)
PLATELET # BLD AUTO: 298 K/UL (ref 130–400)
PMV BLD AUTO: 9.7 FL (ref 9.4–12.3)
POTASSIUM SERPL-SCNC: 4.2 MMOL/L (ref 3.5–5.1)
PROT SERPL-MCNC: 6.8 G/DL (ref 6.4–8.3)
RBC # BLD AUTO: 3.58 M/UL (ref 4.2–5.4)
SODIUM SERPL-SCNC: 139 MMOL/L (ref 136–145)
WBC # BLD AUTO: 6.42 K/UL (ref 4.8–10.8)

## 2025-02-03 PROCEDURE — 1159F MED LIST DOCD IN RCRD: CPT | Performed by: INTERNAL MEDICINE

## 2025-02-03 PROCEDURE — 1090F PRES/ABSN URINE INCON ASSESS: CPT | Performed by: INTERNAL MEDICINE

## 2025-02-03 PROCEDURE — G8417 CALC BMI ABV UP PARAM F/U: HCPCS | Performed by: INTERNAL MEDICINE

## 2025-02-03 PROCEDURE — 99214 OFFICE O/P EST MOD 30 MIN: CPT | Performed by: INTERNAL MEDICINE

## 2025-02-03 PROCEDURE — 99212 OFFICE O/P EST SF 10 MIN: CPT

## 2025-02-03 PROCEDURE — G2211 COMPLEX E/M VISIT ADD ON: HCPCS | Performed by: INTERNAL MEDICINE

## 2025-02-03 PROCEDURE — 85025 COMPLETE CBC W/AUTO DIFF WBC: CPT

## 2025-02-03 PROCEDURE — 36415 COLL VENOUS BLD VENIPUNCTURE: CPT

## 2025-02-03 PROCEDURE — G8427 DOCREV CUR MEDS BY ELIG CLIN: HCPCS | Performed by: INTERNAL MEDICINE

## 2025-02-03 PROCEDURE — G8400 PT W/DXA NO RESULTS DOC: HCPCS | Performed by: INTERNAL MEDICINE

## 2025-02-03 PROCEDURE — 1123F ACP DISCUSS/DSCN MKR DOCD: CPT | Performed by: INTERNAL MEDICINE

## 2025-02-03 PROCEDURE — 80053 COMPREHEN METABOLIC PANEL: CPT

## 2025-02-03 PROCEDURE — 1036F TOBACCO NON-USER: CPT | Performed by: INTERNAL MEDICINE

## 2025-02-03 PROCEDURE — 3017F COLORECTAL CA SCREEN DOC REV: CPT | Performed by: INTERNAL MEDICINE

## 2025-02-12 NOTE — PROGRESS NOTES
Addended by: CUCA SIMON on: 2/12/2025 12:07 PM     Modules accepted: Orders     with patient      Local recurrence/second primary IDC right breast ER/NM positive, HER2 negative  -Patient underwent excision of right breast lesion 7/26/2022.  -7/26/2022 ([facility]) Right breast lesion at 1:00: Skin with attached fibrofatty tissue. No malignancy noted. Surgical margins are unremarkable. Right breast lesion at 3 o'clock: Infiltrating ductal carcinoma, grade 2/3. Lesion extends to surgical margin. ER:Positive (50.95%, Strong) NM:Positive (74.77%, Strong) HER2/ROBBIE TISSUE ASSAY-PARAFFIN RESULTS:1+ Negative.   Right breast lesion at 5 o'clock: Infiltrating ductal carcinoma, grade 2/3. Lesion extends to surgical margin. ER: Positive (41.80%, Strong) NM:Positive (78.95%, Strong) HER2/ROBBIE TISSUE ASSAY-PARAFFIN RESULTS:1+ Negative.   -Unfortunately, no dimensions were provided.  The lesion is grade 2/3.  The lesion is ER/NM positive but less intense than the prior lesions.  -Continue Faslodex for the time being.  The patient has tolerated prior therapies with tamoxifen or aromatase inhibitors very poorly.  -She has started Faslodex about 4 months ago.  Therefore I cannot say that this is a failure of her Faslodex.  In addition, she has no other reasonable option as endocrine therapy.  -I agree with seeing an oncologist locally to have case discussed at tumor board.  I do not know if she will benefit from radiation or chemotherapy at this time.    Case was discussed at [facility].  The patient was seen by medical oncology, radiation oncology and plastic surgery    The plan is to proceed with surgery, adjuvant radiation and continue Faslodex only.  I disagree with adding CDK 4/6.  There is no data to support this.  10/19/22 Right mastectomy by Dr. Lito Sosa at [facility] : Residual two foci of invasive ductal carcinoma, grade 1 (Tubule 2, pleomorphism 2, mitosis 1), located in the dermis.The tumor measures 1.5 mm and 1.4 mm, respectively. No lymphovascular invasion identified.

## 2025-03-17 DIAGNOSIS — C50.919 INFILTRATING DUCTAL CARCINOMA: ICD-10-CM

## 2025-03-17 RX ORDER — TAMOXIFEN CITRATE 10 MG/1
10 TABLET ORAL DAILY
Qty: 90 TABLET | Refills: 1 | Status: SHIPPED | OUTPATIENT
Start: 2025-03-17

## 2025-04-15 ENCOUNTER — HOSPITAL ENCOUNTER (OUTPATIENT)
Dept: INFUSION THERAPY | Age: 70
Discharge: HOME | End: 2025-04-15
Payer: MEDICARE

## 2025-04-15 VITALS
HEART RATE: 63 BPM | DIASTOLIC BLOOD PRESSURE: 60 MMHG | SYSTOLIC BLOOD PRESSURE: 117 MMHG | OXYGEN SATURATION: 100 % | TEMPERATURE: 97.2 F | RESPIRATION RATE: 16 BRPM | BODY MASS INDEX: 34.17 KG/M2 | WEIGHT: 192.9 LBS

## 2025-04-15 DIAGNOSIS — C50.919 INFILTRATING DUCTAL CARCINOMA: ICD-10-CM

## 2025-04-15 DIAGNOSIS — M81.0 AGE-RELATED OSTEOPOROSIS WITHOUT CURRENT PATHOLOGICAL FRACTURE: Primary | ICD-10-CM

## 2025-04-15 DIAGNOSIS — C50.919 INFILTRATING DUCTAL CARCINOMA (HCC): ICD-10-CM

## 2025-04-15 LAB
ALBUMIN SERPL-MCNC: 4 G/DL (ref 3.5–5.2)
ALP SERPL-CCNC: 210 U/L (ref 35–104)
ALT SERPL-CCNC: 64 U/L (ref 5–33)
ANION GAP SERPL CALCULATED.3IONS-SCNC: -1 MMOL/L (ref 7–19)
AST SERPL-CCNC: 39 U/L (ref 5–32)
BASOPHILS # BLD: 0.03 K/UL (ref 0–0.2)
BASOPHILS NFR BLD: 0.5 % (ref 0–1)
BILIRUB SERPL-MCNC: 0.4 MG/DL (ref 0–1.2)
BUN SERPL-MCNC: 19 MG/DL (ref 8–23)
CALCIUM SERPL-MCNC: 9.2 MG/DL (ref 8.8–10.2)
CHLORIDE SERPL-SCNC: 111 MMOL/L (ref 98–107)
CO2 SERPL-SCNC: 27 MMOL/L (ref 22–29)
CREAT SERPL-MCNC: 0.9 MG/DL (ref 0.5–0.9)
EOSINOPHIL # BLD: 0.06 K/UL (ref 0–0.6)
EOSINOPHIL NFR BLD: 0.9 % (ref 0–5)
ERYTHROCYTE [DISTWIDTH] IN BLOOD BY AUTOMATED COUNT: 12.3 % (ref 11.5–14.5)
GLUCOSE SERPL-MCNC: 106 MG/DL (ref 70–99)
HCT VFR BLD AUTO: 35.6 % (ref 37–47)
HGB BLD-MCNC: 12.2 G/DL (ref 12–16)
LYMPHOCYTES # BLD: 2.08 K/UL (ref 1.1–4.5)
LYMPHOCYTES NFR BLD: 32.2 % (ref 20–40)
MCH RBC QN AUTO: 34.6 PG (ref 27–31)
MCHC RBC AUTO-ENTMCNC: 34.3 G/DL (ref 33–37)
MCV RBC AUTO: 100.8 FL (ref 81–99)
MONOCYTES # BLD: 0.37 K/UL (ref 0–0.9)
MONOCYTES NFR BLD: 5.7 % (ref 1–10)
NEUTROPHILS # BLD: 3.9 K/UL (ref 1.5–7.5)
NEUTS SEG NFR BLD: 60.5 % (ref 50–65)
PLATELET # BLD AUTO: 325 K/UL (ref 130–400)
PMV BLD AUTO: 9.7 FL (ref 9.4–12.3)
POTASSIUM SERPL-SCNC: 4 MMOL/L (ref 3.5–5.1)
PROT SERPL-MCNC: 7.4 G/DL (ref 6.4–8.3)
RBC # BLD AUTO: 3.53 M/UL (ref 4.2–5.4)
SODIUM SERPL-SCNC: 137 MMOL/L (ref 136–145)
WBC # BLD AUTO: 6.45 K/UL (ref 4.8–10.8)

## 2025-04-15 PROCEDURE — 36415 COLL VENOUS BLD VENIPUNCTURE: CPT

## 2025-04-15 PROCEDURE — 80053 COMPREHEN METABOLIC PANEL: CPT

## 2025-04-15 PROCEDURE — 96372 THER/PROPH/DIAG INJ SC/IM: CPT

## 2025-04-15 PROCEDURE — 6360000002 HC RX W HCPCS: Performed by: INTERNAL MEDICINE

## 2025-04-15 PROCEDURE — 85025 COMPLETE CBC W/AUTO DIFF WBC: CPT

## 2025-04-15 RX ORDER — EPINEPHRINE 1 MG/ML
0.3 INJECTION, SOLUTION, CONCENTRATE INTRAVENOUS PRN
OUTPATIENT
Start: 2025-09-28

## 2025-04-15 RX ORDER — ONDANSETRON 2 MG/ML
8 INJECTION INTRAMUSCULAR; INTRAVENOUS
OUTPATIENT
Start: 2025-09-28

## 2025-04-15 RX ORDER — FAMOTIDINE 10 MG/ML
20 INJECTION, SOLUTION INTRAVENOUS
OUTPATIENT
Start: 2025-09-28

## 2025-04-15 RX ORDER — ACETAMINOPHEN 325 MG/1
650 TABLET ORAL
OUTPATIENT
Start: 2025-09-28

## 2025-04-15 RX ORDER — ALBUTEROL SULFATE 90 UG/1
4 INHALANT RESPIRATORY (INHALATION) PRN
OUTPATIENT
Start: 2025-09-28

## 2025-04-15 RX ORDER — DIPHENHYDRAMINE HYDROCHLORIDE 50 MG/ML
50 INJECTION, SOLUTION INTRAMUSCULAR; INTRAVENOUS
OUTPATIENT
Start: 2025-09-28

## 2025-04-15 RX ORDER — SODIUM CHLORIDE 9 MG/ML
INJECTION, SOLUTION INTRAVENOUS CONTINUOUS
OUTPATIENT
Start: 2025-09-28

## 2025-04-15 RX ORDER — HYDROCORTISONE SODIUM SUCCINATE 100 MG/2ML
100 INJECTION INTRAMUSCULAR; INTRAVENOUS
OUTPATIENT
Start: 2025-09-28

## 2025-04-15 RX ADMIN — DENOSUMAB 60 MG: 60 INJECTION SUBCUTANEOUS at 10:56

## 2025-04-18 ENCOUNTER — TELEPHONE (OUTPATIENT)
Dept: CARDIOLOGY | Facility: CLINIC | Age: 70
End: 2025-04-18
Payer: MEDICARE

## 2025-05-08 RX ORDER — METOPROLOL SUCCINATE 25 MG/1
25 TABLET, EXTENDED RELEASE ORAL DAILY
Qty: 90 TABLET | Refills: 3 | Status: SHIPPED | OUTPATIENT
Start: 2025-05-08

## 2025-05-14 NOTE — PROGRESS NOTES
Chief Complaint  Hypertension (6mo F/U)    Subjective          Anh Bolanos presents to [facility] CARDIOLOGY for routine follow up. She has hypertension, left ventricular hypertrophy, syncope and obesity. She complains of a two month history of increased dyspnea with moderate exertion. The patient denies chest pain, palpitations, dizziness, syncope, orthopnea, PND, edema or decreased stamina. The patient denies any signs of bleeding.     Hypertension  This is a chronic problem. The current episode started more than 1 year ago. The problem is controlled. Pertinent negatives include no anxiety, blurred vision, chest pain, headaches, malaise/fatigue, neck pain, orthopnea, palpitations, peripheral edema, PND, shortness of breath or sweats. Risk factors for coronary artery disease include obesity and post-menopausal state. Past treatments include angiotensin blockers, diuretics and calcium channel blockers. Current antihypertension treatment includes beta blockers and diuretics. The current treatment provides significant improvement. Hypertensive end-organ damage includes left ventricular hypertrophy.       Objective     Current Outpatient Medications:     CALCIUM-PHOSPHORUS-VITAMIN D PO, Take 1 tablet by mouth Every Evening., Disp: , Rfl:     Collagen-Vitamin C 1000-10 MG tablet, Take 1 tablet by mouth Daily., Disp: , Rfl:     doxepin (SINEquan) 25 MG capsule, Take 1 capsule by mouth Daily., Disp: , Rfl:     fluocinonide (LIDEX) 0.05 % external solution, Apply 1 application topically to the appropriate area as directed As Needed., Disp: , Rfl:     hydroCHLOROthiazide 25 MG tablet, Take 1 tablet by mouth Daily., Disp: 90 tablet, Rfl: 3    metoprolol succinate XL (TOPROL-XL) 25 MG 24 hr tablet, TAKE 1 TABLET BY MOUTH EVERY DAY, Disp: 90 tablet, Rfl: 3    multivitamin with minerals tablet tablet, Take 1 tablet by mouth Daily., Disp: , Rfl:     omeprazole (priLOSEC) 20 MG capsule, Take 1 capsule by  "mouth Daily., Disp: , Rfl:     tamoxifen (NOLVADEX) 10 MG tablet, Take 1 tablet by mouth Daily., Disp: , Rfl:     vitamin B-12 (CYANOCOBALAMIN) 1000 MCG tablet, Take 1 tablet by mouth 1 (One) Time Per Week., Disp: , Rfl:     metoprolol tartrate (LOPRESSOR) 50 MG tablet, Take 50 mg at Bedtime the Night Before Coronary CTA Appointment and In the Morning 1 Hour Prior to Coronary CTA Appointment. Do not take if heart rate less than 60., Disp: 2 tablet, Rfl: 0  Vital Signs:   /83   Pulse 71   Ht 160 cm (63\")   Wt 87.5 kg (193 lb)   SpO2 98%   BMI 34.19 kg/m²     Vitals and nursing note reviewed.   Constitutional:       General: Not in acute distress.     Appearance: Normal and healthy appearance. Well-developed and not in distress. Obese. Not diaphoretic.   Eyes:      General: Lids are normal.         Right eye: No discharge.         Left eye: No discharge.      Conjunctiva/sclera: Conjunctivae normal.      Pupils: Pupils are equal, round, and reactive to light.   HENT:      Head: Normocephalic and atraumatic.      Jaw: There is normal jaw occlusion.      Right Ear: External ear normal.      Left Ear: External ear normal.      Nose: Nose normal.   Neck:      Thyroid: No thyromegaly.      Vascular: No carotid bruit, JVD or JVR. JVD normal.      Trachea: Trachea normal. No tracheal deviation.   Pulmonary:      Effort: Pulmonary effort is normal. No respiratory distress.      Breath sounds: Normal breath sounds. No decreased breath sounds. No wheezing. No rhonchi. No rales.   Chest:      Chest wall: Not tender to palpatation.   Cardiovascular:      PMI at left midclavicular line. Normal rate. Regular rhythm. Normal S1. Normal S2.       Murmurs: There is no murmur.      No gallop.  No click. No rub.   Pulses:     Intact distal pulses. No decreased pulses.   Edema:     Peripheral edema absent.   Abdominal:      General: Bowel sounds are normal. There is no distension.      Palpations: Abdomen is soft.      " Tenderness: There is no abdominal tenderness.   Musculoskeletal: Normal range of motion.         General: No tenderness or deformity.      Cervical back: Normal range of motion and neck supple. Skin:     General: Skin is warm and dry.      Coloration: Skin is not pale.      Findings: No erythema or rash.   Neurological:      General: No focal deficit present.      Mental Status: Alert, oriented to person, place, and time and oriented to person, place and time.   Psychiatric:         Attention and Perception: Attention and perception normal.         Mood and Affect: Mood and affect normal.         Speech: Speech normal.         Behavior: Behavior normal.         Thought Content: Thought content normal.         Cognition and Memory: Cognition and memory normal.         Judgment: Judgment normal.        Result Review :   The following data was reviewed by: MACI Posada on 05/15/2025:  Common labs          10/1/2024    14:39 2/3/2025    11:08 4/15/2025    10:27   Common Labs   Glucose 83     86     106       BUN 20     18     19       Creatinine 1.0     0.9     0.9       Sodium 138     139     137       Potassium 4.1     4.2     4       Chloride 101     101     111       Calcium 9.2     9     9.2       Albumin 4.2     4.2     4       Total Bilirubin 0.5     0.4     0.4       Alkaline Phosphatase 260     143     210       AST (SGOT) 74     48     39       ALT (SGPT) 68     32     64       WBC 6.78     6.42     6.45       Hemoglobin 13     12.4     12.2       Hematocrit 38.8     36.8     35.6       Platelets 335     298     325          Details          This result is from an external source.             Data reviewed : Cardiology studies holter monitor 2/15/24 and carotid artery ultrasound 3/1/24      ECG 12 Lead    Date/Time: 5/15/2025 11:48 AM  Performed by: Kim Negron APRN    Authorized by: Kim Negron APRN  Comparison: compared with previous ECG from 9/17/2024  Similar to previous  ECG  Rhythm: sinus rhythm  Rate: normal  BPM: 89  QRS axis: normal    Clinical impression: normal ECG            Assessment and Plan    Diagnoses and all orders for this visit:    1. Primary hypertension (Primary)- blood pressure is mildly elevated in office today, however pt presents BP log with readings consistently lower than 130/80. Continue HCTZ and metoprolol succinate. Monitor and record daily blood pressure. Report readings consistently higher than 130/80 or consistently lower than 100/60.  Vitals negative for orthostatic hypotension.     2. Class 1 obesity due to excess calories with serious comorbidity and body mass index (BMI) of 34.0 to 34.9 in adult- BMI is >= 30 and <35. (Class 1 Obesity). The following options were offered after discussion;: weight loss educational material (shared in after visit summary).     3. Dyspnea on exertion- check 2d echo and CT angiogram of the coronary arteries.     4. Abnormal echocardiogram- LVH, left atrial dilation and mild MR on 2d echo 7/1/24.    5. Left ventricular hypertrophy- on 2d echo 7/1/24. Pt is at risk for heart failure. Check 2d echo for complaints of increased dyspnea with exertion. Reviewed signs and symptoms of CHF and what to report with the patient. Patient instructed to restrict sodium and weigh daily. Report weight gain of greater than 2 lbs overnight or 5 lbs in 1 week. Pt verbalized understanding of instructions and plan of care.     Follow Up   Return in about 8 weeks (around 7/10/2025) for Next scheduled follow up.  Patient was given instructions and counseling regarding her condition or for health maintenance advice. Please see specific information pulled into the AVS if appropriate.

## 2025-05-15 ENCOUNTER — PREP FOR SURGERY (OUTPATIENT)
Dept: OTHER | Facility: HOSPITAL | Age: 70
End: 2025-05-15
Payer: MEDICARE

## 2025-05-15 ENCOUNTER — OFFICE VISIT (OUTPATIENT)
Dept: CARDIOLOGY | Facility: CLINIC | Age: 70
End: 2025-05-15
Payer: MEDICARE

## 2025-05-15 VITALS
SYSTOLIC BLOOD PRESSURE: 126 MMHG | DIASTOLIC BLOOD PRESSURE: 83 MMHG | HEART RATE: 71 BPM | BODY MASS INDEX: 34.2 KG/M2 | OXYGEN SATURATION: 98 % | HEIGHT: 63 IN | WEIGHT: 193 LBS

## 2025-05-15 DIAGNOSIS — E66.09 CLASS 1 OBESITY DUE TO EXCESS CALORIES WITH SERIOUS COMORBIDITY AND BODY MASS INDEX (BMI) OF 34.0 TO 34.9 IN ADULT: ICD-10-CM

## 2025-05-15 DIAGNOSIS — I10 PRIMARY HYPERTENSION: Primary | ICD-10-CM

## 2025-05-15 DIAGNOSIS — E66.811 CLASS 1 OBESITY DUE TO EXCESS CALORIES WITH SERIOUS COMORBIDITY AND BODY MASS INDEX (BMI) OF 34.0 TO 34.9 IN ADULT: ICD-10-CM

## 2025-05-15 DIAGNOSIS — R06.09 DOE (DYSPNEA ON EXERTION): Primary | ICD-10-CM

## 2025-05-15 DIAGNOSIS — R94.39 ABNORMAL RESULT OF OTHER CARDIOVASCULAR FUNCTION STUDY: ICD-10-CM

## 2025-05-15 DIAGNOSIS — R06.09 DOE (DYSPNEA ON EXERTION): ICD-10-CM

## 2025-05-15 DIAGNOSIS — I51.7 LVH (LEFT VENTRICULAR HYPERTROPHY): ICD-10-CM

## 2025-05-15 DIAGNOSIS — R93.1 ABNORMAL ECHOCARDIOGRAM: ICD-10-CM

## 2025-05-15 RX ORDER — SODIUM CHLORIDE 0.9 % (FLUSH) 0.9 %
10 SYRINGE (ML) INJECTION AS NEEDED
OUTPATIENT
Start: 2025-05-15

## 2025-05-15 RX ORDER — METOPROLOL TARTRATE 50 MG
TABLET ORAL
Qty: 2 TABLET | Refills: 0 | Status: SHIPPED | OUTPATIENT
Start: 2025-05-15

## 2025-05-15 RX ORDER — METOPROLOL TARTRATE 100 MG/1
200 TABLET ORAL ONCE
OUTPATIENT
Start: 2025-05-15 | End: 2025-05-15

## 2025-05-15 RX ORDER — METOPROLOL TARTRATE 50 MG
50 TABLET ORAL
OUTPATIENT
Start: 2025-05-15

## 2025-05-15 RX ORDER — SODIUM CHLORIDE 0.9 % (FLUSH) 0.9 %
10 SYRINGE (ML) INJECTION EVERY 12 HOURS SCHEDULED
OUTPATIENT
Start: 2025-05-15

## 2025-05-15 RX ORDER — METOPROLOL TARTRATE 100 MG/1
100 TABLET ORAL ONCE
OUTPATIENT
Start: 2025-05-15

## 2025-05-15 RX ORDER — METOPROLOL TARTRATE 50 MG
50 TABLET ORAL ONCE
OUTPATIENT
Start: 2025-05-15

## 2025-05-15 RX ORDER — NITROGLYCERIN 0.4 MG/1
0.4 TABLET SUBLINGUAL
OUTPATIENT
Start: 2025-05-15 | End: 2025-05-15

## 2025-05-15 RX ORDER — NITROGLYCERIN 0.4 MG/1
0.8 TABLET SUBLINGUAL
OUTPATIENT
Start: 2025-05-15

## 2025-05-15 RX ORDER — SODIUM CHLORIDE 9 MG/ML
40 INJECTION, SOLUTION INTRAVENOUS AS NEEDED
OUTPATIENT
Start: 2025-05-15

## 2025-05-15 RX ORDER — METOPROLOL TARTRATE 1 MG/ML
5 INJECTION, SOLUTION INTRAVENOUS
OUTPATIENT
Start: 2025-05-15

## 2025-05-16 ENCOUNTER — TELEPHONE (OUTPATIENT)
Dept: GASTROENTEROLOGY | Age: 70
End: 2025-05-16

## 2025-05-16 NOTE — TELEPHONE ENCOUNTER
Patient received her letter in the mail for 1 year colon recall from 7-30-24. Please called [phone]       Thank you

## 2025-05-21 ENCOUNTER — TELEPHONE (OUTPATIENT)
Dept: CT IMAGING | Facility: HOSPITAL | Age: 70
End: 2025-05-21
Payer: MEDICARE

## 2025-05-21 NOTE — TELEPHONE ENCOUNTER
Patient contacted to confirm CCTA appointment on 5/25/25. Instructions and arrival time reviewed. Patient expresses understanding.

## 2025-05-23 ENCOUNTER — HOSPITAL ENCOUNTER (OUTPATIENT)
Dept: CT IMAGING | Facility: HOSPITAL | Age: 70
Discharge: HOME | End: 2025-05-23
Payer: MEDICARE

## 2025-05-23 VITALS
WEIGHT: 193.2 LBS | SYSTOLIC BLOOD PRESSURE: 146 MMHG | BODY MASS INDEX: 34.23 KG/M2 | RESPIRATION RATE: 14 BRPM | DIASTOLIC BLOOD PRESSURE: 70 MMHG | HEIGHT: 63 IN | OXYGEN SATURATION: 96 % | HEART RATE: 85 BPM | TEMPERATURE: 97.6 F

## 2025-05-23 DIAGNOSIS — R94.39 ABNORMAL RESULT OF OTHER CARDIOVASCULAR FUNCTION STUDY: ICD-10-CM

## 2025-05-23 DIAGNOSIS — R06.09 DOE (DYSPNEA ON EXERTION): ICD-10-CM

## 2025-05-23 PROCEDURE — 75574 CT ANGIO HRT W/3D IMAGE: CPT

## 2025-05-23 PROCEDURE — 25510000001 IOPAMIDOL PER 1 ML: Performed by: NURSE PRACTITIONER

## 2025-05-23 PROCEDURE — A9270 NON-COVERED ITEM OR SERVICE: HCPCS | Performed by: NURSE PRACTITIONER

## 2025-05-23 PROCEDURE — 63710000001 NITROGLYCERIN 0.4 MG SUBLINGUAL TABLET: Performed by: NURSE PRACTITIONER

## 2025-05-23 RX ORDER — SODIUM CHLORIDE 0.9 % (FLUSH) 0.9 %
10 SYRINGE (ML) INJECTION AS NEEDED
Status: DISCONTINUED | OUTPATIENT
Start: 2025-05-23 | End: 2025-05-24 | Stop reason: HOSPADM

## 2025-05-23 RX ORDER — SODIUM CHLORIDE 9 MG/ML
40 INJECTION, SOLUTION INTRAVENOUS AS NEEDED
Status: DISCONTINUED | OUTPATIENT
Start: 2025-05-23 | End: 2025-05-24 | Stop reason: HOSPADM

## 2025-05-23 RX ORDER — METOPROLOL TARTRATE 50 MG
50 TABLET ORAL
Status: DISCONTINUED | OUTPATIENT
Start: 2025-05-23 | End: 2025-05-24 | Stop reason: HOSPADM

## 2025-05-23 RX ORDER — METOPROLOL TARTRATE 100 MG/1
200 TABLET ORAL ONCE
Status: DISCONTINUED | OUTPATIENT
Start: 2025-05-23 | End: 2025-05-24 | Stop reason: HOSPADM

## 2025-05-23 RX ORDER — NITROGLYCERIN 0.4 MG/1
0.4 TABLET SUBLINGUAL
Status: COMPLETED | OUTPATIENT
Start: 2025-05-23 | End: 2025-05-23

## 2025-05-23 RX ORDER — IOPAMIDOL 755 MG/ML
100 INJECTION, SOLUTION INTRAVASCULAR
Status: COMPLETED | OUTPATIENT
Start: 2025-05-23 | End: 2025-05-23

## 2025-05-23 RX ORDER — NITROGLYCERIN 0.4 MG/1
0.8 TABLET SUBLINGUAL
Status: COMPLETED | OUTPATIENT
Start: 2025-05-23 | End: 2025-05-23

## 2025-05-23 RX ORDER — METOPROLOL TARTRATE 50 MG
50 TABLET ORAL ONCE
Status: DISCONTINUED | OUTPATIENT
Start: 2025-05-23 | End: 2025-05-24 | Stop reason: HOSPADM

## 2025-05-23 RX ORDER — METOPROLOL TARTRATE 1 MG/ML
5 INJECTION, SOLUTION INTRAVENOUS
Status: DISCONTINUED | OUTPATIENT
Start: 2025-05-23 | End: 2025-05-24 | Stop reason: HOSPADM

## 2025-05-23 RX ORDER — METOPROLOL TARTRATE 100 MG/1
100 TABLET ORAL ONCE
Status: DISCONTINUED | OUTPATIENT
Start: 2025-05-23 | End: 2025-05-24 | Stop reason: HOSPADM

## 2025-05-23 RX ORDER — SODIUM CHLORIDE 0.9 % (FLUSH) 0.9 %
10 SYRINGE (ML) INJECTION EVERY 12 HOURS SCHEDULED
Status: DISCONTINUED | OUTPATIENT
Start: 2025-05-23 | End: 2025-05-24 | Stop reason: HOSPADM

## 2025-05-23 RX ADMIN — IOPAMIDOL 100 ML: 755 INJECTION, SOLUTION INTRAVENOUS at 13:22

## 2025-05-23 RX ADMIN — NITROGLYCERIN 0.8 MG: 0.4 TABLET SUBLINGUAL at 13:01

## 2025-05-29 ENCOUNTER — TELEPHONE (OUTPATIENT)
Dept: CARDIOLOGY | Facility: CLINIC | Age: 70
End: 2025-05-29
Payer: MEDICARE

## 2025-06-02 LAB — CREAT BLDA-MCNC: 0.9 MG/DL (ref 0.6–1.3)

## 2025-06-19 ENCOUNTER — HOSPITAL ENCOUNTER (OUTPATIENT)
Dept: CARDIOLOGY | Facility: HOSPITAL | Age: 70
Discharge: HOME | End: 2025-06-19
Admitting: NURSE PRACTITIONER
Payer: MEDICARE

## 2025-06-19 VITALS
WEIGHT: 193 LBS | BODY MASS INDEX: 34.2 KG/M2 | DIASTOLIC BLOOD PRESSURE: 70 MMHG | HEIGHT: 63 IN | SYSTOLIC BLOOD PRESSURE: 146 MMHG

## 2025-06-19 DIAGNOSIS — R06.09 DOE (DYSPNEA ON EXERTION): ICD-10-CM

## 2025-06-19 LAB
AORTIC ARCH: 2.4 CM
AORTIC DIMENSIONLESS INDEX: 0.86 (DI)
ASCENDING AORTA: 3.2 CM
AV MEAN PRESS GRAD SYS DOP V1V2: 2.45 MMHG
AV VMAX SYS DOP: 104.6 CM/SEC
BH CV ECHO LEFT VENTRICLE GLOBAL LONGITUDINAL STRAIN: -20.5 %
BH CV ECHO MEAS - 2D AUTO EF SIEMENS: 60.9 %
BH CV ECHO MEAS - AO MAX PG: 4.4 MMHG
BH CV ECHO MEAS - AO ROOT DIAM: 3.5 CM
BH CV ECHO MEAS - AO V2 VTI: 27.7 CM
BH CV ECHO MEAS - AVA(I,D): 2.03 CM2
BH CV ECHO MEAS - EDV(CUBED): 69.9 ML
BH CV ECHO MEAS - EDV(MOD-SP2): 52.4 ML
BH CV ECHO MEAS - EDV(MOD-SP4): 53.3 ML
BH CV ECHO MEAS - EF(MOD-SP2): 60.3 %
BH CV ECHO MEAS - EF(MOD-SP4): 69.7 %
BH CV ECHO MEAS - ESV(CUBED): 21 ML
BH CV ECHO MEAS - ESV(MOD-SP2): 20.8 ML
BH CV ECHO MEAS - ESV(MOD-SP4): 16.1 ML
BH CV ECHO MEAS - FS: 33 %
BH CV ECHO MEAS - IVS/LVPW: 0.92 CM
BH CV ECHO MEAS - IVSD: 0.71 CM
BH CV ECHO MEAS - LA DIMENSION: 4.9 CM
BH CV ECHO MEAS - LAT PEAK E' VEL: 11.3 CM/SEC
BH CV ECHO MEAS - LV DIASTOLIC VOL/BSA (35-75): 28 CM2
BH CV ECHO MEAS - LV MASS(C)D: 88.1 GRAMS
BH CV ECHO MEAS - LV MAX PG: 3.9 MMHG
BH CV ECHO MEAS - LV MEAN PG: 2.03 MMHG
BH CV ECHO MEAS - LV SYSTOLIC VOL/BSA (12-30): 8.5 CM2
BH CV ECHO MEAS - LV V1 MAX: 99.3 CM/SEC
BH CV ECHO MEAS - LV V1 VTI: 23.7 CM
BH CV ECHO MEAS - LVIDD: 4.1 CM
BH CV ECHO MEAS - LVIDS: 2.8 CM
BH CV ECHO MEAS - LVOT AREA: 2.37 CM2
BH CV ECHO MEAS - LVOT DIAM: 1.74 CM
BH CV ECHO MEAS - LVPWD: 0.77 CM
BH CV ECHO MEAS - MED PEAK E' VEL: 6.8 CM/SEC
BH CV ECHO MEAS - MV A MAX VEL: 86 CM/SEC
BH CV ECHO MEAS - MV DEC SLOPE: 377.7 CM/SEC2
BH CV ECHO MEAS - MV DEC TIME: 0.19 SEC
BH CV ECHO MEAS - MV E MAX VEL: 71.9 CM/SEC
BH CV ECHO MEAS - MV E/A: 0.84
BH CV ECHO MEAS - MV MAX PG: 4.2 MMHG
BH CV ECHO MEAS - MV MEAN PG: 2.22 MMHG
BH CV ECHO MEAS - MV V2 VTI: 24.3 CM
BH CV ECHO MEAS - MVA(VTI): 2.32 CM2
BH CV ECHO MEAS - PA V2 MAX: 93.5 CM/SEC
BH CV ECHO MEAS - PI END-D VEL: 87 CM/SEC
BH CV ECHO MEAS - PULM A REVS DUR: 0.14 SEC
BH CV ECHO MEAS - PULM A REVS VEL: 30.2 CM/SEC
BH CV ECHO MEAS - RV MAX PG: 1.92 MMHG
BH CV ECHO MEAS - RV V1 MAX: 69.2 CM/SEC
BH CV ECHO MEAS - RV V1 VTI: 16.5 CM
BH CV ECHO MEAS - RVDD: 3.7 CM
BH CV ECHO MEAS - SV(LVOT): 56.4 ML
BH CV ECHO MEAS - SV(MOD-SP2): 31.6 ML
BH CV ECHO MEAS - SV(MOD-SP4): 37.2 ML
BH CV ECHO MEAS - SVI(LVOT): 29.6 ML/M2
BH CV ECHO MEAS - SVI(MOD-SP2): 16.6 ML/M2
BH CV ECHO MEAS - SVI(MOD-SP4): 19.5 ML/M2
BH CV ECHO MEAS - TAPSE (>1.6): 2.7 CM
BH CV ECHO MEAS - TR MAX PG: 24.4 MMHG
BH CV ECHO MEAS - TR MAX VEL: 247.2 CM/SEC
BH CV ECHO MEASUREMENTS AVERAGE E/E' RATIO: 7.94
BH CV XLRA - RV BASE: 3.5 CM
BH CV XLRA - RV LENGTH: 5.9 CM
BH CV XLRA - RV MID: 2.38 CM
BH CV XLRA - TDI S': 17 CM/SEC
LEFT ATRIUM VOLUME INDEX: 27 ML/M2
LEFT ATRIUM VOLUME: 49 ML
LV EF BIPLANE MOD: 64 %

## 2025-06-19 PROCEDURE — 93356 MYOCRD STRAIN IMG SPCKL TRCK: CPT

## 2025-06-19 PROCEDURE — 93306 TTE W/DOPPLER COMPLETE: CPT

## 2025-07-15 PROBLEM — K83.8 DILATION OF COMMON BILE DUCT: Status: ACTIVE | Noted: 2025-07-15

## 2025-07-15 NOTE — PROGRESS NOTES
Chief Complaint  Dyspnea (8wk F/U), Hypertension, and Results (CTA/Echo)    Subjective          Anh Bolanos presents to [facility] CARDIOLOGY for routine follow up of outpatient testing.  CT angiogram of the coronary arteries on 5/23/2025 revealed mild to moderate pulmonary fibrosis, biliary ductal dilatation and small hiatal hernia with coronary calcium score of 0, myocardial bridging of the mid LAD and otherwise normal epicardial coronary arteries.  2D echo on 6/19/2025 revealed normal left ventricular systolic function with ejection fraction 61 to 65%, normal left ventricular diastolic function, normal global longitudinal LV strain of -20.5% and no significant valvular heart disease.  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 She continues to complain of increased dyspnea with moderate exertion. The patient denies chest pain, palpitations, dizziness, syncope, orthopnea, PND, edema or decreased stamina. The patient denies any signs of bleeding.     Hypertension  This is a chronic problem. The current episode started more than 1 year ago. The problem is controlled. Pertinent negatives include no anxiety, blurred vision, chest pain, headaches, malaise/fatigue, neck pain, orthopnea, palpitations, peripheral edema, PND, shortness of breath or sweats. Risk factors for coronary artery disease include obesity and post-menopausal state. Past treatments include angiotensin blockers, diuretics and calcium channel blockers. Current antihypertension treatment includes beta blockers and diuretics. The current treatment provides significant improvement. Hypertensive end-organ damage includes left ventricular hypertrophy.     I have reviewed and confirmed the accuracy of the ROS  MACI Posada    Objective     Current Outpatient Medications:     CALCIUM-PHOSPHORUS-VITAMIN D  "PO, Take 1 tablet by mouth Every Evening., Disp: , Rfl:     Collagen-Vitamin C 1000-10 MG tablet, Take 1 tablet by mouth Daily., Disp: , Rfl:     denosumab (Prolia) 60 MG/ML solution prefilled syringe syringe, Inject 1 mL under the skin into the appropriate area as directed Every 6 (Six) Months., Disp: , Rfl:     doxepin (SINEquan) 25 MG capsule, Take 1 capsule by mouth Daily., Disp: , Rfl:     fluocinonide (LIDEX) 0.05 % external solution, Apply 1 application topically to the appropriate area as directed As Needed., Disp: , Rfl:     hydroCHLOROthiazide 25 MG tablet, Take 1 tablet by mouth Daily., Disp: 90 tablet, Rfl: 3    metoprolol succinate XL (TOPROL-XL) 25 MG 24 hr tablet, TAKE 1 TABLET BY MOUTH EVERY DAY, Disp: 90 tablet, Rfl: 3    multivitamin with minerals tablet tablet, Take 1 tablet by mouth Daily., Disp: , Rfl:     omeprazole (priLOSEC) 20 MG capsule, Take 1 capsule by mouth Daily., Disp: , Rfl:     tamoxifen (NOLVADEX) 10 MG tablet, Take 1 tablet by mouth Daily., Disp: , Rfl:     vitamin B-12 (CYANOCOBALAMIN) 1000 MCG tablet, Take 1 tablet by mouth 1 (One) Time Per Week., Disp: , Rfl:   Vital Signs:   /79   Pulse 85   Ht 160 cm (63\")   Wt 86.2 kg (190 lb)   SpO2 98%   BMI 33.66 kg/m²     Vitals and nursing note reviewed.   Constitutional:       General: Not in acute distress.     Appearance: Normal and healthy appearance. Well-developed and not in distress. Obese. Not diaphoretic.   Eyes:      General: Lids are normal.         Right eye: No discharge.         Left eye: No discharge.      Conjunctiva/sclera: Conjunctivae normal.      Pupils: Pupils are equal, round, and reactive to light.   HENT:      Head: Normocephalic and atraumatic.      Jaw: There is normal jaw occlusion.      Right Ear: External ear normal.      Left Ear: External ear normal.      Nose: Nose normal.   Neck:      Thyroid: No thyromegaly.      Vascular: No carotid bruit, JVD or JVR. JVD normal.      Trachea: Trachea " "normal. No tracheal deviation.   Pulmonary:      Effort: Pulmonary effort is normal. No respiratory distress.      Breath sounds: Normal breath sounds. No decreased breath sounds. No wheezing. No rhonchi. No rales.      Comments: Bilateral \"velcro\" sound   Chest:      Chest wall: Not tender to palpatation.   Cardiovascular:      PMI at left midclavicular line. Normal rate. Regular rhythm. Normal S1. Normal S2.       Murmurs: There is no murmur.      No gallop.  No click. No rub.   Pulses:     Intact distal pulses. No decreased pulses.   Edema:     Peripheral edema absent.   Abdominal:      General: Bowel sounds are normal. There is no distension.      Palpations: Abdomen is soft.      Tenderness: There is no abdominal tenderness.   Musculoskeletal: Normal range of motion.         General: No tenderness or deformity.      Cervical back: Normal range of motion and neck supple. Skin:     General: Skin is warm and dry.      Coloration: Skin is not pale.      Findings: No erythema or rash.   Neurological:      General: No focal deficit present.      Mental Status: Alert, oriented to person, place, and time and oriented to person, place and time.   Psychiatric:         Attention and Perception: Attention and perception normal.         Mood and Affect: Mood and affect normal.         Speech: Speech normal.         Behavior: Behavior normal.         Thought Content: Thought content normal.         Cognition and Memory: Cognition and memory normal.         Judgment: Judgment normal.        Result Review :   The following data was reviewed by: MACI Posada on 07/16/2025:  Common labs          2/3/2025    11:08 4/15/2025    10:27 5/23/2025    12:17   Common Labs   Glucose 86     106        BUN 18     19        Creatinine 0.9     0.9     0.90    Sodium 139     137        Potassium 4.2     4        Chloride 101     111        Calcium 9     9.2        Albumin 4.2     4        Total Bilirubin 0.4     0.4        Alkaline " Phosphatase 143     210        AST (SGOT) 48     39        ALT (SGPT) 32     64        WBC 6.42     6.45        Hemoglobin 12.4     12.2        Hematocrit 36.8     35.6        Platelets 298     325           Details          This result is from an external source.             Data reviewed : Cardiology studies CT angiogram of the coronary arteries 5/23/2025, 2D echo 6/19/2025, holter monitor 2/15/24 and carotid artery ultrasound 3/1/24          Assessment and Plan    Diagnoses and all orders for this visit:    1. Primary hypertension (Primary)- blood pressure is controlled. Continue HCTZ and metoprolol succinate. Monitor and record daily blood pressure. Report readings consistently higher than 130/80 or consistently lower than 100/60.      2. Class 1 obesity due to excess calories with serious comorbidity and body mass index (BMI) of 33.0 to 33.9 in adult- BMI is >= 30 and <35. (Class 1 Obesity). The following options were offered after discussion;: weight loss educational material (shared in after visit summary).     3. Dyspnea on exertion-no structural signs of heart failure or significant valvular heart disease on 2d echo 6/19/2025.  No significant coronary artery disease on CT angiogram of the coronary arteries 5/23/2025.  Mild to moderate pulmonary fibrosis noted on CT angiogram of the coronary arteries. Pt is scheduled to see Dr. Stephanie Zuluaga with pulmonology at  in [place], [place] in August.     4. Abnormal echocardiogram- LVH, left atrial dilation and mild MR on 2d echo 7/1/24.  Normal left ventricular wall thickness and cavity size with normal left atrial size and volume and no mitral valve regurgitation on 2D echo 6/19/2025.    5. Left ventricular hypertrophy- on 2d echo 7/1/24.  Normal left ventricular cavity size and wall thickness on most recent 2D echo 6/19/2025.    6.  Dilation of common bile duct- chronic. Pt follows with GI.     Follow Up   Return in about 6 months (around 1/16/2026) for Next  scheduled follow up.  Patient was given instructions and counseling regarding her condition or for health maintenance advice. Please see specific information pulled into the AVS if appropriate.

## 2025-07-16 ENCOUNTER — OFFICE VISIT (OUTPATIENT)
Dept: CARDIOLOGY | Facility: CLINIC | Age: 70
End: 2025-07-16
Payer: MEDICARE

## 2025-07-16 VITALS
WEIGHT: 190 LBS | DIASTOLIC BLOOD PRESSURE: 79 MMHG | OXYGEN SATURATION: 98 % | HEART RATE: 85 BPM | HEIGHT: 63 IN | SYSTOLIC BLOOD PRESSURE: 121 MMHG | BODY MASS INDEX: 33.66 KG/M2

## 2025-07-16 DIAGNOSIS — E66.811 CLASS 1 OBESITY DUE TO EXCESS CALORIES WITH SERIOUS COMORBIDITY AND BODY MASS INDEX (BMI) OF 33.0 TO 33.9 IN ADULT: ICD-10-CM

## 2025-07-16 DIAGNOSIS — E66.09 CLASS 1 OBESITY DUE TO EXCESS CALORIES WITH SERIOUS COMORBIDITY AND BODY MASS INDEX (BMI) OF 33.0 TO 33.9 IN ADULT: ICD-10-CM

## 2025-07-16 DIAGNOSIS — I51.7 LVH (LEFT VENTRICULAR HYPERTROPHY): ICD-10-CM

## 2025-07-16 DIAGNOSIS — I10 PRIMARY HYPERTENSION: Primary | ICD-10-CM

## 2025-07-16 DIAGNOSIS — K83.8 DILATION OF COMMON BILE DUCT: ICD-10-CM

## 2025-07-16 DIAGNOSIS — R93.1 ABNORMAL ECHOCARDIOGRAM: ICD-10-CM

## 2025-07-16 DIAGNOSIS — R06.09 DOE (DYSPNEA ON EXERTION): ICD-10-CM

## 2025-07-16 RX ORDER — DENOSUMAB 60 MG/ML
60 INJECTION SUBCUTANEOUS
COMMUNITY

## 2025-07-29 ENCOUNTER — TELEPHONE (OUTPATIENT)
Dept: GASTROENTEROLOGY | Age: 70
End: 2025-07-29

## 2025-07-29 NOTE — TELEPHONE ENCOUNTER
Called patient to confirm procedure scheduled for   8.1.25 @1030 with  at [facility]          Patient confirmed and has prep

## 2025-07-30 ENCOUNTER — ANESTHESIA EVENT (OUTPATIENT)
Dept: OPERATING ROOM | Age: 70
End: 2025-07-30

## 2025-08-01 ENCOUNTER — HOSPITAL ENCOUNTER (OUTPATIENT)
Age: 70
Setting detail: OUTPATIENT SURGERY
Discharge: HOME | End: 2025-08-01
Attending: INTERNAL MEDICINE | Admitting: INTERNAL MEDICINE
Payer: MEDICARE

## 2025-08-01 ENCOUNTER — APPOINTMENT (OUTPATIENT)
Dept: OPERATING ROOM | Age: 70
End: 2025-08-01
Attending: INTERNAL MEDICINE

## 2025-08-01 ENCOUNTER — ANESTHESIA (OUTPATIENT)
Dept: OPERATING ROOM | Age: 70
End: 2025-08-01

## 2025-08-01 VITALS
OXYGEN SATURATION: 100 % | WEIGHT: 185 LBS | BODY MASS INDEX: 32.78 KG/M2 | HEART RATE: 67 BPM | HEIGHT: 63 IN | SYSTOLIC BLOOD PRESSURE: 108 MMHG | TEMPERATURE: 97.6 F | RESPIRATION RATE: 18 BRPM | DIASTOLIC BLOOD PRESSURE: 54 MMHG

## 2025-08-01 PROBLEM — Z86.0100 PERSONAL HISTORY OF COLON POLYPS, UNSPECIFIED: Status: ACTIVE | Noted: 2025-08-01

## 2025-08-01 PROCEDURE — G0105 COLORECTAL SCRN; HI RISK IND: HCPCS | Performed by: INTERNAL MEDICINE

## 2025-08-01 PROCEDURE — G8917 PT W IV AB NOT GIVEN ON TIME: HCPCS

## 2025-08-01 PROCEDURE — G0105 COLORECTAL SCRN; HI RISK IND: HCPCS

## 2025-08-01 PROCEDURE — G8907 PT DOC NO EVENTS ON DISCHARG: HCPCS

## 2025-08-01 RX ORDER — PROPOFOL 10 MG/ML
INJECTION, EMULSION INTRAVENOUS
Status: DISCONTINUED | OUTPATIENT
Start: 2025-08-01 | End: 2025-08-01 | Stop reason: SDUPTHER

## 2025-08-01 RX ORDER — SODIUM CHLORIDE, SODIUM LACTATE, POTASSIUM CHLORIDE, CALCIUM CHLORIDE 600; 310; 30; 20 MG/100ML; MG/100ML; MG/100ML; MG/100ML
INJECTION, SOLUTION INTRAVENOUS CONTINUOUS
Status: DISCONTINUED | OUTPATIENT
Start: 2025-08-01 | End: 2025-08-01 | Stop reason: HOSPADM

## 2025-08-01 RX ORDER — LIDOCAINE HYDROCHLORIDE 10 MG/ML
INJECTION, SOLUTION EPIDURAL; INFILTRATION; INTRACAUDAL; PERINEURAL
Status: DISCONTINUED | OUTPATIENT
Start: 2025-08-01 | End: 2025-08-01 | Stop reason: SDUPTHER

## 2025-08-01 RX ADMIN — PROPOFOL 150 MG: 10 INJECTION, EMULSION INTRAVENOUS at 11:14

## 2025-08-01 RX ADMIN — LIDOCAINE HYDROCHLORIDE 50 MG: 10 INJECTION, SOLUTION EPIDURAL; INFILTRATION; INTRACAUDAL; PERINEURAL at 11:10

## 2025-08-01 RX ADMIN — SODIUM CHLORIDE, SODIUM LACTATE, POTASSIUM CHLORIDE, CALCIUM CHLORIDE: 600; 310; 30; 20 INJECTION, SOLUTION INTRAVENOUS at 10:44

## 2025-08-01 RX ADMIN — PROPOFOL 50 MG: 10 INJECTION, EMULSION INTRAVENOUS at 11:10

## 2025-08-01 ASSESSMENT — PAIN - FUNCTIONAL ASSESSMENT
PAIN_FUNCTIONAL_ASSESSMENT: NONE - DENIES PAIN
PAIN_FUNCTIONAL_ASSESSMENT: 0-10

## 2025-08-01 NOTE — DISCHARGE INSTRUCTIONS
1. Repeat colonoscopy: Based on colonoscopy findings today, prep quality and her past history-in 5 years; sooner if she were to manifest any  lower GI symptoms such as bleeding, abdominal pain, change in bowel habits or stool caliber or if the patient has anemia or unexplained weight loss in the future.   2.- Resume previous meds and diet  - GI clinic f/u PRN   - Keep scheduled f/u appts with other MDs     POST-OP ORDERS: ENDOSCOPY & COLONOSCOPY:    1. Rest today.    2. DO NOT eat or drink until wide awake; eat your usual diet today in moderate amount only.    3. DO NOT drive today.    4. Call physician if you have severe pain, vomiting, fever, rectal bleeding or black bowel movements.    5.  If a biopsy was taken or a polyp removed, you should expect to hear results in about 21 days.  If you have heard nothing from your physician by then, call the office for results.    6.  Discharge home when patient awake, vitals signs stable and tolerating liquids.    7. Call with questions or concerns [phone].

## 2025-08-01 NOTE — ANESTHESIA PRE PROCEDURE
height or weight on file to calculate BMI.    CBC:   Lab Results   Component Value Date/Time    WBC 6.45 04/15/2025 10:27 AM    RBC 3.53 04/15/2025 10:27 AM    HGB 12.2 04/15/2025 10:27 AM    HCT 35.6 04/15/2025 10:27 AM    .8 04/15/2025 10:27 AM    RDW 12.3 04/15/2025 10:27 AM     04/15/2025 10:27 AM       CMP:   Lab Results   Component Value Date/Time     04/15/2025 10:27 AM    K 4.0 04/15/2025 10:27 AM     04/15/2025 10:27 AM    CO2 27 04/15/2025 10:27 AM    BUN 19 04/15/2025 10:27 AM    CREATININE 0.9 04/15/2025 10:27 AM    LABGLOM 68 04/15/2025 10:27 AM    LABGLOM >90 03/29/2024 11:20 AM    GLUCOSE 106 04/15/2025 10:27 AM    CALCIUM 9.2 04/15/2025 10:27 AM    BILITOT 0.4 04/15/2025 10:27 AM    ALKPHOS 210 04/15/2025 10:27 AM    AST 39 04/15/2025 10:27 AM    ALT 64 04/15/2025 10:27 AM       POC Tests: No results for input(s): \"POCGLU\", \"POCNA\", \"POCK\", \"POCCL\", \"POCBUN\", \"POCHEMO\", \"POCHCT\" in the last 72 hours.    Coags: No results found for: \"PROTIME\", \"INR\", \"APTT\"    HCG (If Applicable): No results found for: \"PREGTESTUR\", \"PREGSERUM\", \"HCG\", \"HCGQUANT\"     ABGs: No results found for: \"PHART\", \"PO2ART\", \"BAS1OEY\", \"OAP4OQB\", \"BEART\", \"C6IRRSYO\"     Type & Screen (If Applicable):  No results found for: \"ABORH\", \"LABANTI\"    Drug/Infectious Status (If Applicable):  No results found for: \"HIV\", \"HEPCAB\"    COVID-19 Screening (If Applicable): No results found for: \"COVID19\"        Anesthesia Evaluation  Patient summary reviewed and Nursing notes reviewed  Airway: Mallampati: II  TM distance: >3 FB   Neck ROM: full  Mouth opening: > = 3 FB   Dental: normal exam         Pulmonary:Negative Pulmonary ROS and normal exam                               Cardiovascular:  Exercise tolerance: good (>4 METS)  (+) hypertension:         Beta Blocker:  Dose within 24 Hrs         Neuro/Psych:   Negative Neuro/Psych ROS              GI/Hepatic/Renal: Neg GI/Hepatic/Renal ROS            Endo/Other:

## 2025-08-01 NOTE — H&P
Patient Name: Ally Medina  : 1955  MRN: [id number]  DATE: 25    Allergies:   Allergies   Allergen Reactions    Penicillins Hives and Itching    Other Diarrhea and Nausea Only     Sweating      Bicitra        ENDOSCOPY  History and Physical    Procedure:    [] Diagnostic Colonoscopy       [x] Screening Colonoscopy  [] EGD      [] ERCP      [] EUS       [] Other    [x] Previous office notes/History and Physical reviewed from the patients chart. Please see EMR for further details of HPI. I have examined the patient's status immediately prior to the procedure and:      Indications/HPI:    []Abdominal Pain   []Cancer- GI/Lung     []Fhx of colon CA/polyps  []History of Polyps  []Barretts            []Melena  []Abnormal Imaging              []Dysphagia              []Persistent Pneumonia   []Anemia                            []Food Impaction        [x]History of Polyps  [] GI Bleed             []Pulmonary nodule/Mass   []Change in bowel habits []Heartburn/Reflux  []Rectal Bleed (BRBPR)  []Chest Pain - Non Cardiac []Heme (+) Stool []Ulcers  []Constipation  []Hemoptysis  []Varices  []Diarrhea  []Hypoxemia    []Nausea/Vomiting   []Screening   []Crohns/Colitis  []Other:     Anesthesia:   [x] MAC [] Moderate Sedation   [] General   [] None     ROS: 12 pt Review of Symptoms was negative unless mentioned above    Medications:   Prior to Admission medications    Medication Sig Start Date End Date Taking? Authorizing Provider   tamoxifen (NOLVADEX) 10 MG tablet TAKE 1 TABLET BY MOUTH EVERY DAY 3/17/25  Yes Marty Brunson MD   hydroCHLOROthiazide (HYDRODIURIL) 25 MG tablet Take 1 tablet by mouth daily 24  Yes Provider, Historical, MD   metoprolol succinate (TOPROL XL) 25 MG extended release tablet Take 1 tablet by mouth daily 24  Yes Provider, Historical, MD   omeprazole (PRILOSEC) 20 MG delayed release capsule Take by mouth daily 8/15/23  Yes Provider, Historical, MD   doxepin (SINEQUAN) 25

## 2025-08-01 NOTE — OP NOTE
Patient: Ally Medina : 1955  [facility] Rec#: [id number] Acc#: [id number]   Primary Care Provider Lana Urrutia APRN - NP    Date of Procedure:  2025    Endoscopist: Bella Sexton MD, MD    Referring Provider: Lana Urrutia APRN - NP,     Operation Performed: Colonoscopy up to the ileocolonic anastomosis and distal terminal ileum    Indications: Personal history of colon polyps; needs colonoscopy for surveillance    Anesthesia:  Sedation was administered by anesthesia who monitored the patient during the procedure.    I met with Ally Medina prior to procedure. We discussed the procedure itself, and I have discussed the risks of endoscopy (including-- but not limited to-- pain, discomfort, bleeding potentially requiring second endoscopic procedure and/or blood transfusion, organ perforation requiring operative repair, damage to organs near the colon, infection, aspiration, cardiopulmonary/allergic reaction), benefits, indications to endoscopy. Additionally, we discussed options other than colonoscopy. The patient expressed understanding. All questions answered. The patient decided to proceed with the procedure.  Signed informed consent was placed on the chart.    Blood Loss: minimal    Withdrawal time: More than 9 minutes  Bowel Prep: Fair  with small amounts of thick solid and semisolid stool and a moderate amount of thick, opaque liquid scattered in patchy segments throughout the colon obscuring the underlying mucosa.Lesions including polyps may have been missed.     Complications: no immediate complications    DESCRIPTION OF PROCEDURE:     A time out was performed. After written informed consent was obtained, the patient was placed in the left lateral position.     The perianal area was inspected, and a digital rectal exam was performed. A rectal exam was performed: normal tone, no palpable lesions. At this point, a forward viewing Olympus colonoscope was inserted into  the anus and carefully advanced to the distal terminal ileum and ileocolonic anastomosis.  The postoperative changes were recognized as expected. The colonoscope was then slowly withdrawn with careful inspection of the mucosa in a linear and circumferential fashion. The scope was retroflexed in the rectum. Suction was utilized during the procedure to remove as much air as possible from the bowel. The colonoscope was removed from the patient, and the procedure was terminated.  Findings are listed below.        Findings:   Postoperative changes as as expected with a history of right hemicolectomy and ileocolonic anastomosis was confirmed but without any visible staples or sutures in the area.  The terminal ileum otherwise appeared normal.    NO large polyps or masses or strictures or colitis.  Suboptimal exam due to prep quality as described above.    Internal hemorrhoids-Grade 1  Where it was clearly visible, the mucosa appeared normal throughout the entire examined colon  Retroflexion in the rectum was otherwise normal and revealed no further abnormalities      Recommendations:  1. Repeat colonoscopy: Based on colonoscopy findings today, prep quality and her past history-in 5 years; sooner if she were to manifest any  lower GI symptoms such as bleeding, abdominal pain, change in bowel habits or stool caliber or if the patient has anemia or unexplained weight loss in the future.   2.- Resume previous meds and diet  - GI clinic f/u PRN   - Keep scheduled f/u appts with other MDs     Findings and recommendations were discussed w/ the patient. A copy of the images was provided.    (Please note that portions of this note were completed with a voice recognition program. Efforts were made to edit the dictations but occasionally words may be mis-transcribed.)     Bella Sexton MD, MD  8/1/2025  11:09 AM

## 2025-08-01 NOTE — ANESTHESIA POSTPROCEDURE EVALUATION
Department of Anesthesiology  Postprocedure Note    Patient: Ally Medina  MRN: [id number]  YOB: 1955  Date of evaluation: 8/1/2025    Procedure Summary       Date: 08/01/25 Room / Location: [place] [id number] / [facility]    Anesthesia Start: 1106 Anesthesia Stop:     Procedure: COLORECTAL CANCER SCREENING, NOT HIGH RISK (Abdomen) Diagnosis:       Screen for colon cancer      History of colon polyps      (Screen for colon cancer [Z12.11])      (History of colon polyps [Z86.0100])    Surgeons: Blela Sexton MD Responsible Provider: Molly Ennis APRN - CRNA    Anesthesia Type: general, TIVA ASA Status: 1            Anesthesia Type: No value filed.    Dominga Phase I: Dominga Score: 10    Dominga Phase II:      Anesthesia Post Evaluation    Patient location during evaluation: bedside  Patient participation: complete - patient participated  Level of consciousness: sleepy but conscious  Pain score: 0  Airway patency: patent  Nausea & Vomiting: no nausea and no vomiting  Cardiovascular status: blood pressure returned to baseline  Respiratory status: acceptable, room air and spontaneous ventilation  Hydration status: euvolemic  Pain management: adequate    No notable events documented.

## (undated) DEVICE — SUPPLEMENT DIGESTIVE H2O SOL GI-EASE

## (undated) DEVICE — CLEANING SPONGE: Brand: KOALA™

## (undated) DEVICE — BRUSH ENDOSCP 2 END CHN HEDGEHOG

## (undated) DEVICE — FORCEPS BX 240CM 2.4MM L NDL RAD JAW 4 M00513334

## (undated) DEVICE — ADAPTER CLEANING PORPOISE CLEANING

## (undated) DEVICE — BITE BLOCK ENDOSCP AD 60 FR W/ ADJ STRP PLAS GRN BLOX

## (undated) DEVICE — SINGLE PORT MANIFOLD: Brand: NEPTUNE 2

## (undated) DEVICE — CANNULA NSL AD L7FT DIV O2 CO2 W/ M LUERLOCK TRMPT CONN

## (undated) DEVICE — ENDO KIT,LOURDES HOSPITAL: Brand: MEDLINE INDUSTRIES, INC.

## (undated) DEVICE — CLEANING BRUSH - SINGLE USE: Brand: SAFEGUIDE®